# Patient Record
Sex: FEMALE | URBAN - METROPOLITAN AREA
[De-identification: names, ages, dates, MRNs, and addresses within clinical notes are randomized per-mention and may not be internally consistent; named-entity substitution may affect disease eponyms.]

---

## 2022-11-25 ENCOUNTER — OFFICE VISIT (OUTPATIENT)
Dept: FAMILY MEDICINE CLINIC | Facility: HOSPITAL | Age: 72
End: 2022-11-25

## 2022-11-25 VITALS
TEMPERATURE: 98.2 F | HEIGHT: 64 IN | DIASTOLIC BLOOD PRESSURE: 79 MMHG | BODY MASS INDEX: 25.99 KG/M2 | WEIGHT: 152.2 LBS | OXYGEN SATURATION: 100 % | HEART RATE: 76 BPM | SYSTOLIC BLOOD PRESSURE: 128 MMHG

## 2022-11-25 DIAGNOSIS — E78.2 MIXED HYPERLIPIDEMIA: ICD-10-CM

## 2022-11-25 DIAGNOSIS — Z23 ENCOUNTER FOR IMMUNIZATION: ICD-10-CM

## 2022-11-25 DIAGNOSIS — I35.0 MODERATE AORTIC STENOSIS: ICD-10-CM

## 2022-11-25 DIAGNOSIS — I10 ESSENTIAL (PRIMARY) HYPERTENSION: Primary | ICD-10-CM

## 2022-11-25 PROBLEM — K92.2 LOWER GASTROINTESTINAL HEMORRHAGE: Status: ACTIVE | Noted: 2022-04-29

## 2022-11-25 PROBLEM — S72.141A CLOSED INTERTROCHANTERIC FRACTURE OF HIP, RIGHT, INITIAL ENCOUNTER (HCC): Status: ACTIVE | Noted: 2021-12-20

## 2022-11-25 PROBLEM — R01.1 HEART MURMUR: Status: ACTIVE | Noted: 2022-11-25

## 2022-11-25 RX ORDER — AMLODIPINE BESYLATE AND ATORVASTATIN CALCIUM 10; 10 MG/1; MG/1
1 TABLET, FILM COATED ORAL DAILY
COMMUNITY
End: 2022-11-25 | Stop reason: SDUPTHER

## 2022-11-25 RX ORDER — SIMVASTATIN 10 MG
10 TABLET ORAL
COMMUNITY
End: 2022-11-25 | Stop reason: SDUPTHER

## 2022-11-25 RX ORDER — SIMVASTATIN 10 MG
10 TABLET ORAL
Qty: 90 TABLET | Refills: 3 | Status: SHIPPED | OUTPATIENT
Start: 2022-11-25

## 2022-11-25 RX ORDER — ASPIRIN 81 MG/1
81 TABLET, CHEWABLE ORAL DAILY
COMMUNITY

## 2022-11-25 RX ORDER — AMLODIPINE BESYLATE AND ATORVASTATIN CALCIUM 10; 10 MG/1; MG/1
1 TABLET, FILM COATED ORAL DAILY
Qty: 90 TABLET | Refills: 3 | Status: SHIPPED | OUTPATIENT
Start: 2022-11-25

## 2022-11-25 NOTE — PROGRESS NOTES
Name: Cynthia Christie      : 1950      MRN: [id number]  Encounter Provider: Cathy Mejias MD  Encounter Date: 2022   Encounter department: [facility] [place] [address]     1  Essential (primary) hypertension  -     amLODIPine-atorvastatin (CADUET) 10-10 MG per tablet; Take 1 tablet by mouth daily    2  Mixed hyperlipidemia  -     simvastatin (ZOCOR) 10 mg tablet; Take 1 tablet (10 mg total) by mouth daily at bedtime    3  Moderate aortic stenosis  -     Echo complete w/ contrast if indicated; Future; Expected date: 2022    4  Encounter for immunization  -     influenza vaccine, high-dose, PF 0 7 mL (FLUZONE HIGH-DOSE)      BMI Counseling: Body mass index is 26 13 kg/m²  The BMI is above normal  Nutrition recommendations include decreasing portion sizes, limiting drinks that contain sugar, moderation in carbohydrate intake and reducing intake of cholesterol  Exercise recommendations include exercising 3-5 times per week  No pharmacotherapy was ordered  Rationale for BMI follow-up plan is due to patient being overweight or obese  Depression Screening and Follow-up Plan: Patient was screened for depression during today's encounter  They screened negative with a PHQ-2 score of 0  Subjective      New patient establishing care    Extensive review of existing records and labs from 2022    Feeling very well overall  No recent illness or injury    Discussed her femur fracture with surgical nail repair  Had good result with help of PT and continues with exercises  Also had fractured wrist which is now fully functional    Discussed aortic stenosis identified on TTE at time of surgery  She wasn't aware of this and also doesn't have any associated symptoms  I advised the need to recheck now as a year from last to see if any progression    Review of Systems   Constitutional: Negative for unexpected weight change  Eyes: Negative for visual disturbance  Respiratory: Negative  Cardiovascular: Negative  Gastrointestinal: Negative  Genitourinary: Negative  Musculoskeletal: Negative  Neurological: Negative  Hematological: Negative  Psychiatric/Behavioral: Negative  All other systems reviewed and are negative  Current Outpatient Medications on File Prior to Visit   Medication Sig   • aspirin (Aspirin 81) 81 mg chewable tablet Chew 81 mg daily   • Multiple Vitamin (MULTIVITAMIN ADULT PO) Take 1 tablet by mouth daily       Objective     /79 (BP Location: Left arm, Patient Position: Sitting, Cuff Size: Standard)   Pulse 76   Temp 98 2 °F (36 8 °C) (Tympanic)   Ht 5' 4" (1 626 m)   Wt 69 kg (152 lb 3 2 oz)   SpO2 100%   BMI 26 13 kg/m²     Physical Exam  Vitals and nursing note reviewed  Constitutional:       Appearance: Normal appearance  Neck:      Vascular: Carotid bruit present  Cardiovascular:      Rate and Rhythm: Normal rate and regular rhythm  Pulses: Normal pulses  Heart sounds: Murmur heard  Pulmonary:      Breath sounds: Normal breath sounds  Musculoskeletal:      Right lower leg: No edema  Left lower leg: No edema  Neurological:      General: No focal deficit present  Mental Status: She is alert and oriented to person, place, and time     Psychiatric:         Mood and Affect: Mood normal        Ori Kohler MD

## 2022-12-05 ENCOUNTER — TELEPHONE (OUTPATIENT)
Dept: FAMILY MEDICINE CLINIC | Facility: HOSPITAL | Age: 72
End: 2022-12-05

## 2023-01-17 ENCOUNTER — HOSPITAL ENCOUNTER (OUTPATIENT)
Dept: NON INVASIVE DIAGNOSTICS | Age: 73
Discharge: HOME | End: 2023-01-17

## 2023-01-17 VITALS
DIASTOLIC BLOOD PRESSURE: 79 MMHG | BODY MASS INDEX: 25.95 KG/M2 | HEIGHT: 64 IN | SYSTOLIC BLOOD PRESSURE: 128 MMHG | HEART RATE: 86 BPM | WEIGHT: 152 LBS

## 2023-01-17 DIAGNOSIS — I35.0 MODERATE AORTIC STENOSIS: ICD-10-CM

## 2023-01-17 LAB
AORTIC ROOT: 2.8 CM
AORTIC VALVE MEAN VELOCITY: 21.8 M/S
APICAL FOUR CHAMBER EJECTION FRACTION: 56 %
AV AREA BY CONTINUOUS VTI: 1 CM2
AV AREA PEAK VELOCITY: 1 CM2
AV LVOT MEAN GRADIENT: 3 MMHG
AV LVOT PEAK GRADIENT: 5 MMHG
AV MEAN GRADIENT: 22 MMHG
AV PEAK GRADIENT: 37 MMHG
AV VALVE AREA: 1.02 CM2
AV VELOCITY RATIO: 0.37
AVA (PLAN): 1.1 CM2
DOP CALC AO PEAK VEL: 3.05 M/S
DOP CALC AO VTI: 68.07 CM
DOP CALC LVOT AREA: 2.83 CM2
DOP CALC LVOT DIAMETER: 1.9 CM
DOP CALC LVOT PEAK VEL VTI: 24.56 CM
DOP CALC LVOT PEAK VEL: 1.12 M/S
DOP CALC LVOT STROKE INDEX: 40.8 ML/M2
DOP CALC LVOT STROKE VOLUME: 69.6 CM3
E WAVE DECELERATION TIME: 243 MS
FRACTIONAL SHORTENING: 30 % (ref 28–44)
INTERVENTRICULAR SEPTUM IN DIASTOLE (PARASTERNAL SHORT AXIS VIEW): 0.9 CM
INTERVENTRICULAR SEPTUM: 0.9 CM (ref 0.6–1.1)
LAAS-AP2: 12.4 CM2
LAAS-AP4: 17.1 CM2
LEFT ATRIUM SIZE: 3 CM
LEFT INTERNAL DIMENSION IN SYSTOLE: 3 CM (ref 2.1–4)
LEFT VENTRICLE DIASTOLIC VOLUME (MOD BIPLANE): 106 ML
LEFT VENTRICLE SYSTOLIC VOLUME (MOD BIPLANE): 44 ML
LEFT VENTRICULAR INTERNAL DIMENSION IN DIASTOLE: 4.3 CM (ref 3.5–6)
LEFT VENTRICULAR POSTERIOR WALL IN END DIASTOLE: 1 CM
LEFT VENTRICULAR STROKE VOLUME: 47 ML
LV EF: 58 %
LVSV (TEICH): 47 ML
MV E'TISSUE VEL-SEP: 7 CM/S
MV PEAK A VEL: 0.98 M/S
MV PEAK E VEL: 56 CM/S
MV STENOSIS PRESSURE HALF TIME: 70 MS
MV VALVE AREA P 1/2 METHOD: 3.14 CM2
RA PRESSURE ESTIMATED: 8 MMHG
RIGHT ATRIUM AREA SYSTOLE A4C: 9.9 CM2
RIGHT VENTRICLE ID DIMENSION: 3.2 CM
RV PSP: 41 MMHG
SL CV LEFT ATRIUM LENGTH A2C: 4 CM
SL CV LV EF: 65
SL CV PED ECHO LEFT VENTRICLE DIASTOLIC VOLUME (MOD BIPLANE) 2D: 82 ML
SL CV PED ECHO LEFT VENTRICLE SYSTOLIC VOLUME (MOD BIPLANE) 2D: 35 ML
TR MAX PG: 33 MMHG
TR PEAK VELOCITY: 2.9 M/S
TRICUSPID VALVE PEAK REGURGITATION VELOCITY: 2.86 M/S

## 2023-05-23 ENCOUNTER — OFFICE VISIT (OUTPATIENT)
Dept: FAMILY MEDICINE CLINIC | Facility: HOSPITAL | Age: 73
End: 2023-05-23
Payer: MEDICARE

## 2023-05-23 VITALS
WEIGHT: 157.6 LBS | OXYGEN SATURATION: 99 % | TEMPERATURE: 98.7 F | BODY MASS INDEX: 26.91 KG/M2 | SYSTOLIC BLOOD PRESSURE: 122 MMHG | HEART RATE: 70 BPM | HEIGHT: 64 IN | DIASTOLIC BLOOD PRESSURE: 78 MMHG

## 2023-05-23 DIAGNOSIS — Z00.00 MEDICARE ANNUAL WELLNESS VISIT, SUBSEQUENT: Primary | ICD-10-CM

## 2023-05-23 DIAGNOSIS — I35.0 MODERATE AORTIC STENOSIS: ICD-10-CM

## 2023-05-23 DIAGNOSIS — I10 ESSENTIAL (PRIMARY) HYPERTENSION: ICD-10-CM

## 2023-05-23 DIAGNOSIS — E78.2 MIXED HYPERLIPIDEMIA: ICD-10-CM

## 2023-05-23 PROBLEM — S72.141A CLOSED INTERTROCHANTERIC FRACTURE OF HIP, RIGHT, INITIAL ENCOUNTER (HCC): Status: RESOLVED | Noted: 2021-12-20 | Resolved: 2023-05-23

## 2023-05-23 PROCEDURE — 99214 OFFICE O/P EST MOD 30 MIN: CPT | Performed by: FAMILY MEDICINE

## 2023-05-23 PROCEDURE — G0439 PPPS, SUBSEQ VISIT: HCPCS | Performed by: FAMILY MEDICINE

## 2023-05-23 RX ORDER — SIMVASTATIN 10 MG
10 TABLET ORAL
Qty: 90 TABLET | Refills: 3 | Status: SHIPPED | OUTPATIENT
Start: 2023-05-23 | End: 2023-05-25 | Stop reason: SDUPTHER

## 2023-05-23 NOTE — PATIENT INSTRUCTIONS
Medicare Preventive Visit Patient Instructions  Thank you for completing your Welcome to Medicare Visit or Medicare Annual Wellness Visit today  Your next wellness visit will be due in one year (5/23/2024)  The screening/preventive services that you may require over the next 5-10 years are detailed below  Some tests may not apply to you based off risk factors and/or age  Screening tests ordered at today's visit but not completed yet may show as past due  Also, please note that scanned in results may not display below  Preventive Screenings:  Service Recommendations Previous Testing/Comments   Colorectal Cancer Screening  * Colonoscopy    * Fecal Occult Blood Test (FOBT)/Fecal Immunochemical Test (FIT)  * Fecal DNA/Cologuard Test  * Flexible Sigmoidoscopy Age: 39-70 years old   Colonoscopy: every 10 years (may be performed more frequently if at higher risk)  OR  FOBT/FIT: every 1 year  OR  Cologuard: every 3 years  OR  Sigmoidoscopy: every 5 years  Screening may be recommended earlier than age 39 if at higher risk for colorectal cancer  Also, an individualized decision between you and your healthcare provider will decide whether screening between the ages of 74-80 would be appropriate  Colonoscopy: Not on file  FOBT/FIT: Not on file  Cologuard: Not on file  Sigmoidoscopy: Not on file          Breast Cancer Screening Age: 36 years old  Frequency: every 1-2 years  Not required if history of left and right mastectomy Mammogram: Not on file        Cervical Cancer Screening Between the ages of 21-29, pap smear recommended once every 3 years  Between the ages of 33-67, can perform pap smear with HPV co-testing every 5 years     Recommendations may differ for women with a history of total hysterectomy, cervical cancer, or abnormal pap smears in past  Pap Smear: Not on file    Screening Not Indicated   Hepatitis C Screening Once for adults born between [facility]  More frequently in patients at high risk for Hepatitis C Hep C Antibody: Not on file        Diabetes Screening 1-2 times per year if you're at risk for diabetes or have pre-diabetes Fasting glucose: No results in last 5 years (No results in last 5 years)  A1C: No results in last 5 years (No results in last 5 years)      Cholesterol Screening Once every 5 years if you don't have a lipid disorder  May order more often based on risk factors  Lipid panel: Not on file    Screening Not Indicated  History Lipid Disorder     Other Preventive Screenings Covered by Medicare:  1  Abdominal Aortic Aneurysm (AAA) Screening: covered once if your at risk  You're considered to be at risk if you have a family history of AAA  2  Lung Cancer Screening: covers low dose CT scan once per year if you meet all of the following conditions: (1) Age 50-69; (2) No signs or symptoms of lung cancer; (3) Current smoker or have quit smoking within the last 15 years; (4) You have a tobacco smoking history of at least 20 pack years (packs per day multiplied by number of years you smoked); (5) You get a written order from a healthcare provider  3  Glaucoma Screening: covered annually if you're considered high risk: (1) You have diabetes OR (2) Family history of glaucoma OR (3)  aged 48 and older OR (3)  American aged 72 and older  3  Osteoporosis Screening: covered every 2 years if you meet one of the following conditions: (1) You're estrogen deficient and at risk for osteoporosis based off medical history and other findings; (2) Have a vertebral abnormality; (3) On glucocorticoid therapy for more than 3 months; (4) Have primary hyperparathyroidism; (5) On osteoporosis medications and need to assess response to drug therapy  · Last bone density test (DXA Scan): Not on file  5  HIV Screening: covered annually if you're between the age of 12-76  Also covered annually if you are younger than 13 and older than 72 with risk factors for HIV infection   For pregnant patients, it is covered up to 3 times per pregnancy  Immunizations:  Immunization Recommendations   Influenza Vaccine Annual influenza vaccination during flu season is recommended for all persons aged >= 6 months who do not have contraindications   Pneumococcal Vaccine   * Pneumococcal conjugate vaccine = PCV13 (Prevnar 13), PCV15 (Vaxneuvance), PCV20 (Prevnar 20)  * Pneumococcal polysaccharide vaccine = PPSV23 (Pneumovax) Adults 25-60 years old: 1-3 doses may be recommended based on certain risk factors  Adults 72 years old: 1-2 doses may be recommended based off what pneumonia vaccine you previously received   Hepatitis B Vaccine 3 dose series if at intermediate or high risk (ex: diabetes, end stage renal disease, liver disease)   Tetanus (Td) Vaccine - COST NOT COVERED BY MEDICARE PART B Following completion of primary series, a booster dose should be given every 10 years to maintain immunity against tetanus  Td may also be given as tetanus wound prophylaxis  Tdap Vaccine - COST NOT COVERED BY MEDICARE PART B Recommended at least once for all adults  For pregnant patients, recommended with each pregnancy  Shingles Vaccine (Shingrix) - COST NOT COVERED BY MEDICARE PART B  2 shot series recommended in those aged 48 and above     Health Maintenance Due:      Topic Date Due   • Hepatitis C Screening  Never done   • Colorectal Cancer Screening  Never done     Immunizations Due:      Topic Date Due   • COVID-19 Vaccine (1) Never done   • Pneumococcal Vaccine: 65+ Years (1 - PCV) Never done     Advance Directives   What are advance directives? Advance directives are legal documents that state your wishes and plans for medical care  These plans are made ahead of time in case you lose your ability to make decisions for yourself  Advance directives can apply to any medical decision, such as the treatments you want, and if you want to donate organs  What are the types of advance directives?   There are many types of advance directives, and each state has rules about how to use them  You may choose a combination of any of the following:  · Living will: This is a written record of the treatment you want  You can also choose which treatments you do not want, which to limit, and which to stop at a certain time  This includes surgery, medicine, IV fluid, and tube feedings  · Durable power of  for healthcare [facility]): This is a written record that states who you want to make healthcare choices for you when you are unable to make them for yourself  This person, called a proxy, is usually a family member or a friend  You may choose more than 1 proxy  · Do not resuscitate (DNR) order:  A DNR order is used in case your heart stops beating or you stop breathing  It is a request not to have certain forms of treatment, such as CPR  A DNR order may be included in other types of advance directives  · Medical directive: This covers the care that you want if you are in a coma, near death, or unable to make decisions for yourself  You can list the treatments you want for each condition  Treatment may include pain medicine, surgery, blood transfusions, dialysis, IV or tube feedings, and a ventilator (breathing machine)  · Values history: This document has questions about your views, beliefs, and how you feel and think about life  This information can help others choose the care that you would choose  Why are advance directives important? An advance directive helps you control your care  Although spoken wishes may be used, it is better to have your wishes written down  Spoken wishes can be misunderstood, or not followed  Treatments may be given even if you do not want them  An advance directive may make it easier for your family to make difficult choices about your care     Weight Management   Why it is important to manage your weight:  Being overweight increases your risk of health conditions such as heart disease, high blood pressure, type 2 diabetes, and certain types of cancer  It can also increase your risk for osteoarthritis, sleep apnea, and other respiratory problems  Aim for a slow, steady weight loss  Even a small amount of weight loss can lower your risk of health problems  How to lose weight safely:  A safe and healthy way to lose weight is to eat fewer calories and get regular exercise  You can lose up about 1 pound a week by decreasing the number of calories you eat by 500 calories each day  Healthy meal plan for weight management:  A healthy meal plan includes a variety of foods, contains fewer calories, and helps you stay healthy  A healthy meal plan includes the following:  · Eat whole-grain foods more often  A healthy meal plan should contain fiber  Fiber is the part of grains, fruits, and vegetables that is not broken down by your body  Whole-grain foods are healthy and provide extra fiber in your diet  Some examples of whole-grain foods are whole-wheat breads and pastas, oatmeal, brown rice, and bulgur  · Eat a variety of vegetables every day  Include dark, leafy greens such as spinach, kale, saji greens, and mustard greens  Eat yellow and orange vegetables such as carrots, sweet potatoes, and winter squash  · Eat a variety of fruits every day  Choose fresh or canned fruit (canned in its own juice or light syrup) instead of juice  Fruit juice has very little or no fiber  · Eat low-fat dairy foods  Drink fat-free (skim) milk or 1% milk  Eat fat-free yogurt and low-fat cottage cheese  Try low-fat cheeses such as mozzarella and other reduced-fat cheeses  · Choose meat and other protein foods that are low in fat  Choose beans or other legumes such as split peas or lentils  Choose fish, skinless poultry (chicken or turkey), or lean cuts of red meat (beef or pork)  Before you cook meat or poultry, cut off any visible fat  · Use less fat and oil  Try baking foods instead of frying them   Add less fat, such as margarine, sour cream, regular salad dressing and mayonnaise to foods  Eat fewer high-fat foods  Some examples of high-fat foods include french fries, doughnuts, ice cream, and cakes  · Eat fewer sweets  Limit foods and drinks that are high in sugar  This includes candy, cookies, regular soda, and sweetened drinks  Exercise:  Exercise at least 30 minutes per day on most days of the week  Some examples of exercise include walking, biking, dancing, and swimming  You can also fit in more physical activity by taking the stairs instead of the elevator or parking farther away from stores  Ask your healthcare provider about the best exercise plan for you  © Copyright [employer] 2018 Information is for End User's use only and may not be sold, redistributed or otherwise used for commercial purposes   All illustrations and images included in CareNotes® are the copyrighted property of A D A M , Inc  or [address] [place] [address]

## 2023-05-23 NOTE — PROGRESS NOTES
Assessment and Plan:     Problem List Items Addressed This Visit        Cardiovascular and Mediastinum    Essential (primary) hypertension    Relevant Orders    CBC and Platelet (Completed)    Comprehensive metabolic panel (Completed)    TSH, 3rd generation with Free T4 reflex (Completed)    Moderate aortic stenosis       Other    Mixed hyperlipidemia    Relevant Orders    Lipid panel (Completed)    Medicare annual wellness visit, subsequent - Primary     BMI Counseling: Body mass index is 27 05 kg/m²  The BMI is above normal  Nutrition recommendations include decreasing portion sizes, encouraging healthy choices of fruits and vegetables, limiting drinks that contain sugar and moderation in carbohydrate intake  Exercise recommendations include exercising 3-5 times per week  No pharmacotherapy was ordered  Rationale for BMI follow-up plan is due to patient being overweight or obese  Depression Screening and Follow-up Plan: Patient was screened for depression during today's encounter  They screened negative with a PHQ-2 score of 0  Preventive health issues were discussed with patient, and age appropriate screening tests were ordered as noted in patient's After Visit Summary  Personalized health advice and appropriate referrals for health education or preventive services given if needed, as noted in patient's After Visit Summary  History of Present Illness:     Patient presents for a Medicare Wellness Visit    AWV and 6 month follow up  Had been stressed with her husbands medical issues with a prolonged and complicated rehab course    She is feeling well overall  No depression voiced    Has one daughter who lives locally and is their POA     Patient Care Team:  Myrna Franklin MD as PCP - General (Family Medicine)     Review of Systems:     Review of Systems   Constitutional: Negative  Negative for unexpected weight change  HENT: Negative  Respiratory: Negative  Cardiovascular: Negative  Genitourinary: Negative  Musculoskeletal: Negative  Neurological: Negative  Psychiatric/Behavioral: Negative  All other systems reviewed and are negative  Problem List:     Patient Active Problem List   Diagnosis   • Essential (primary) hypertension   • Heart murmur   • Lower gastrointestinal hemorrhage   • Mixed hyperlipidemia   • Moderate aortic stenosis   • Medicare annual wellness visit, subsequent      Past Medical and Surgical History:     Past Medical History:   Diagnosis Date   • High blood pressure      Past Surgical History:   Procedure Laterality Date   • LEG SURGERY Right     inserted ebenezer 2021      Family History:     History reviewed  No pertinent family history  Social History:     Social History     Socioeconomic History   • Marital status: /Civil Union     Spouse name: None   • Number of children: None   • Years of education: None   • Highest education level: None   Occupational History   • None   Tobacco Use   • Smoking status: Former     Types: Cigarettes     Quit date:      Years since quittin 5   • Smokeless tobacco: Never   Vaping Use   • Vaping Use: Never used   Substance and Sexual Activity   • Alcohol use: Not Currently   • Drug use: Not Currently   • Sexual activity: None   Other Topics Concern   • None   Social History Narrative   • None     Social Determinants of Health     Financial Resource Strain: Medium Risk (2023)    Overall Financial Resource Strain (CARDIA)    • Difficulty of Paying Living Expenses: Somewhat hard   Food Insecurity: Not on file   Transportation Needs: No Transportation Needs (2023)    PRAPARE - Transportation    • Lack of Transportation (Medical): No    • Lack of Transportation (Non-Medical):  No   Physical Activity: Not on file   Stress: Not on file   Social Connections: Not on file   Intimate Partner Violence: Not on file   Housing Stability: Not on file      Medications and Allergies:     Current Outpatient Medications   Medication Sig Dispense Refill   • Multiple Vitamin (MULTIVITAMIN ADULT PO) Take 1 tablet by mouth daily     • amLODIPine (NORVASC) 10 mg tablet Take 1 tablet (10 mg total) by mouth daily 90 tablet 3   • simvastatin (ZOCOR) 10 mg tablet Take 1 tablet (10 mg total) by mouth daily at bedtime 90 tablet 3     No current facility-administered medications for this visit  Allergies   Allergen Reactions   • Citrus - Food Allergy Hives   • Codeine Hives   • Penicillin G Rash      Immunizations:     Immunization History   Administered Date(s) Administered   • Influenza, high dose seasonal 0 7 mL 12/25/2021, 11/25/2022   • Influenza, seasonal, injectable 02/15/2022      Health Maintenance:         Topic Date Due   • Hepatitis C Screening  Never done   • Colorectal Cancer Screening  Never done         Topic Date Due   • COVID-19 Vaccine (1) Never done   • Pneumococcal Vaccine: 65+ Years (1 - PCV) Never done      Medicare Screening Tests and Risk Assessments:     Dyan Jones is here for her Subsequent Wellness visit  Last Medicare Wellness visit information reviewed, patient interviewed and updates made to the record today  Health Risk Assessment:   Patient rates overall health as very good  Patient feels that their physical health rating is same  Patient is satisfied with their life  Eyesight was rated as same  Hearing was rated as same  Patient feels that their emotional and mental health rating is same  Patients states they are sometimes angry  Patient states they are sometimes unusually tired/fatigued  Pain experienced in the last 7 days has been some  Patient's pain rating has been 2/10  Patient states that she has experienced no weight loss or gain in last 6 months  Depression Screening:   PHQ-2 Score: 0      Fall Risk Screening:    In the past year, patient has experienced: no history of falling in past year      Urinary Incontinence Screening:   Patient has not leaked urine accidently in the last six months  Home Safety:  Patient does not have trouble with stairs inside or outside of their home  Patient has working smoke alarms and has working carbon monoxide detector  Home safety hazards include: none  Nutrition:   Current diet is Regular  Medications:   Patient is not currently taking any over-the-counter supplements  Patient is able to manage medications  Activities of Daily Living (ADLs)/Instrumental Activities of Daily Living (IADLs):   Walk and transfer into and out of bed and chair?: Yes  Dress and groom yourself?: Yes    Bathe or shower yourself?: Yes    Feed yourself? Yes  Do your laundry/housekeeping?: Yes  Manage your money, pay your bills and track your expenses?: Yes  Make your own meals?: Yes    Do your own shopping?: Yes    Previous Hospitalizations:   Any hospitalizations or ED visits within the last 12 months?: No      Advance Care Planning:   Living will: Yes    Durable POA for healthcare:  Yes    Advanced directive: Yes    Advanced directive counseling given: Yes    Five wishes given: Yes    Patient declined ACP directive: No    End of Life Decisions reviewed with patient: Yes    Provider agrees with end of life decisions: Yes      Cognitive Screening:   Provider or family/friend/caregiver concerned regarding cognition?: No    PREVENTIVE SCREENINGS      Cardiovascular Screening:    General: Screening Not Indicated and History Lipid Disorder      Diabetes Screening:     General: Screening Current      Colorectal Cancer Screening:     General: Risks and Benefits Discussed    Due for: Colonoscopy - Low Risk      Breast Cancer Screening:     General: Risks and Benefits Discussed    Due for: Mammogram        Cervical Cancer Screening:    General: Screening Not Indicated      Osteoporosis Screening:    General: Risks and Benefits Discussed    Due for: DXA Axial      Abdominal Aortic Aneurysm (AAA) Screening:        General: Screening Not Indicated      Lung Cancer Screening: "General: Screening Not Indicated      Hepatitis C Screening:    General: Risks and Benefits Discussed    Hep C Screening Accepted: Yes      Screening, Brief Intervention, and Referral to Treatment (SBIRT)    Screening  Typical number of drinks in a day: 0  Typical number of drinks in a week: 0  Interpretation: Low risk drinking behavior  Single Item Drug Screening:  How often have you used an illegal drug (including marijuana) or a prescription medication for non-medical reasons in the past year? never    Single Item Drug Screen Score: 0  Interpretation: Negative screen for possible drug use disorder    No results found  Physical Exam:     /78 (BP Location: Left arm, Patient Position: Sitting, Cuff Size: Standard)   Pulse 70   Temp 98 7 °F (37 1 °C) (Tympanic)   Ht 5' 4\" (1 626 m)   Wt 71 5 kg (157 lb 9 6 oz)   SpO2 99%   BMI 27 05 kg/m²     Physical Exam  Vitals and nursing note reviewed  Constitutional:       Appearance: Normal appearance  Neck:      Vascular: No carotid bruit  Cardiovascular:      Rate and Rhythm: Normal rate and regular rhythm  Heart sounds: Murmur heard  Pulmonary:      Breath sounds: Normal breath sounds  Musculoskeletal:      Right lower leg: No edema  Left lower leg: No edema  Lymphadenopathy:      Cervical: No cervical adenopathy  Neurological:      Mental Status: She is alert and oriented to person, place, and time     Psychiatric:         Mood and Affect: Mood normal           Piedad Copeland MD  "

## 2023-05-25 DIAGNOSIS — E78.2 MIXED HYPERLIPIDEMIA: ICD-10-CM

## 2023-05-25 DIAGNOSIS — I10 ESSENTIAL (PRIMARY) HYPERTENSION: ICD-10-CM

## 2023-05-25 RX ORDER — AMLODIPINE BESYLATE 10 MG/1
10 TABLET ORAL DAILY
Qty: 90 TABLET | Refills: 3 | Status: SHIPPED | OUTPATIENT
Start: 2023-05-25

## 2023-05-25 RX ORDER — SIMVASTATIN 10 MG
10 TABLET ORAL
Qty: 90 TABLET | Refills: 3 | Status: SHIPPED | OUTPATIENT
Start: 2023-05-25

## 2023-05-26 ENCOUNTER — APPOINTMENT (OUTPATIENT)
Dept: LAB | Facility: HOSPITAL | Age: 73
End: 2023-05-26

## 2023-05-26 DIAGNOSIS — E78.2 MIXED HYPERLIPIDEMIA: ICD-10-CM

## 2023-05-26 DIAGNOSIS — I10 ESSENTIAL (PRIMARY) HYPERTENSION: ICD-10-CM

## 2023-05-26 LAB
ALBUMIN SERPL BCP-MCNC: 3.6 G/DL (ref 3.5–5)
ALP SERPL-CCNC: 74 U/L (ref 46–116)
ALT SERPL W P-5'-P-CCNC: 13 U/L (ref 12–78)
ANION GAP SERPL CALCULATED.3IONS-SCNC: 0 MMOL/L (ref 4–13)
AST SERPL W P-5'-P-CCNC: 11 U/L (ref 5–45)
BILIRUB SERPL-MCNC: 0.46 MG/DL (ref 0.2–1)
BUN SERPL-MCNC: 12 MG/DL (ref 5–25)
CALCIUM SERPL-MCNC: 9.5 MG/DL (ref 8.3–10.1)
CHLORIDE SERPL-SCNC: 112 MMOL/L (ref 96–108)
CHOLEST SERPL-MCNC: 183 MG/DL
CO2 SERPL-SCNC: 27 MMOL/L (ref 21–32)
CREAT SERPL-MCNC: 0.99 MG/DL (ref 0.6–1.3)
ERYTHROCYTE [DISTWIDTH] IN BLOOD BY AUTOMATED COUNT: 13.1 % (ref 11.6–15.1)
GFR SERPL CREATININE-BSD FRML MDRD: 55 ML/MIN/1.73SQ M
GLUCOSE P FAST SERPL-MCNC: 93 MG/DL (ref 65–99)
HCT VFR BLD AUTO: 42.9 % (ref 34.8–46.1)
HDLC SERPL-MCNC: 88 MG/DL
HGB BLD-MCNC: 14.2 G/DL (ref 11.5–15.4)
LDLC SERPL CALC-MCNC: 83 MG/DL (ref 0–100)
MCH RBC QN AUTO: 29.8 PG (ref 26.8–34.3)
MCHC RBC AUTO-ENTMCNC: 33.1 G/DL (ref 31.4–37.4)
MCV RBC AUTO: 90 FL (ref 82–98)
NONHDLC SERPL-MCNC: 95 MG/DL
PLATELET # BLD AUTO: 188 THOUSANDS/UL (ref 149–390)
PMV BLD AUTO: 11 FL (ref 8.9–12.7)
POTASSIUM SERPL-SCNC: 4.2 MMOL/L (ref 3.5–5.3)
PROT SERPL-MCNC: 7.2 G/DL (ref 6.4–8.4)
RBC # BLD AUTO: 4.76 MILLION/UL (ref 3.81–5.12)
SODIUM SERPL-SCNC: 139 MMOL/L (ref 135–147)
T4 FREE SERPL-MCNC: 1.18 NG/DL (ref 0.61–1.12)
TRIGL SERPL-MCNC: 58 MG/DL
TSH SERPL DL<=0.05 MIU/L-ACNC: <0.01 UIU/ML (ref 0.45–4.5)
WBC # BLD AUTO: 4.61 THOUSAND/UL (ref 4.31–10.16)

## 2023-06-20 DIAGNOSIS — E05.90 HYPERTHYROIDISM: Primary | ICD-10-CM

## 2023-06-21 ENCOUNTER — TELEPHONE (OUTPATIENT)
Dept: FAMILY MEDICINE CLINIC | Facility: HOSPITAL | Age: 73
End: 2023-06-21

## 2023-06-27 ENCOUNTER — APPOINTMENT (OUTPATIENT)
Dept: LAB | Facility: HOSPITAL | Age: 73
End: 2023-06-27
Payer: MEDICARE

## 2023-06-27 DIAGNOSIS — E05.90 HYPERTHYROIDISM: ICD-10-CM

## 2023-06-27 LAB
T3 SERPL-MCNC: 1.6 NG/ML
T3FREE SERPL-MCNC: 3.66 PG/ML (ref 2.5–3.9)
T4 FREE SERPL-MCNC: 1.3 NG/DL (ref 0.61–1.12)
TSH SERPL DL<=0.05 MIU/L-ACNC: <0.01 UIU/ML (ref 0.45–4.5)

## 2023-06-27 PROCEDURE — 84442 ASSAY OF THYROID ACTIVITY: CPT

## 2023-06-27 PROCEDURE — 84439 ASSAY OF FREE THYROXINE: CPT

## 2023-06-27 PROCEDURE — 86376 MICROSOMAL ANTIBODY EACH: CPT

## 2023-06-27 PROCEDURE — 86800 THYROGLOBULIN ANTIBODY: CPT

## 2023-06-27 PROCEDURE — 84481 FREE ASSAY (FT-3): CPT

## 2023-06-27 PROCEDURE — 84480 ASSAY TRIIODOTHYRONINE (T3): CPT

## 2023-06-27 PROCEDURE — 84443 ASSAY THYROID STIM HORMONE: CPT

## 2023-06-27 PROCEDURE — 36415 COLL VENOUS BLD VENIPUNCTURE: CPT

## 2023-06-29 LAB
THYROGLOB AB SERPL-ACNC: <1 IU/ML (ref 0–0.9)
THYROPEROXIDASE AB SERPL-ACNC: <9 IU/ML (ref 0–34)

## 2023-06-30 LAB — T4BG SERPL-MCNC: 21 UG/ML (ref 13–39)

## 2023-07-06 ENCOUNTER — TELEPHONE (OUTPATIENT)
Dept: FAMILY MEDICINE CLINIC | Facility: HOSPITAL | Age: 73
End: 2023-07-06

## 2023-07-06 DIAGNOSIS — E05.90 HYPERTHYROIDISM: Primary | ICD-10-CM

## 2023-07-07 ENCOUNTER — TELEPHONE (OUTPATIENT)
Dept: ENDOCRINOLOGY | Facility: CLINIC | Age: 73
End: 2023-07-07

## 2023-07-07 NOTE — TELEPHONE ENCOUNTER
[facility]. Called patient to schedule a Consult with Endocrinology. Patient did not understand why she needed this appointment. I advised patient to refer back to PCP who put referral in. She said that her PCP cannot do anything for her. I asked patient if she would like to make an appointment to discuss this issue and she agree. I proceeded to give patient 8/14/23 as an appointment date and she began to yell and stating if her life was in danger then why did she have to wait a month for an appointment, then hung up.

## 2023-07-07 NOTE — TELEPHONE ENCOUNTER
Pt stopped by this afternoon with further concerns regarding her thyroid labs. I explained that she is in hyperthyroidism and that her thyroid is working too fast. Still very confused. Wanted to know if there was anything in her diet that she could change and why this happens. Advised it would be best to follow-up with Endo and not a life-threatening situation. Has appointment with them next month and I reassured her that this timeframe is fine. Reassured for now, but would like a call from you if possible for further reassurance.

## 2023-07-12 ENCOUNTER — CONSULT (OUTPATIENT)
Dept: ENDOCRINOLOGY | Facility: HOSPITAL | Age: 73
End: 2023-07-12
Payer: MEDICARE

## 2023-07-12 VITALS
HEIGHT: 64 IN | SYSTOLIC BLOOD PRESSURE: 120 MMHG | WEIGHT: 153.8 LBS | OXYGEN SATURATION: 99 % | DIASTOLIC BLOOD PRESSURE: 70 MMHG | BODY MASS INDEX: 26.26 KG/M2 | HEART RATE: 113 BPM

## 2023-07-12 DIAGNOSIS — R79.89 LOW TSH LEVEL: Primary | ICD-10-CM

## 2023-07-12 PROCEDURE — 99204 OFFICE O/P NEW MOD 45 MIN: CPT | Performed by: STUDENT IN AN ORGANIZED HEALTH CARE EDUCATION/TRAINING PROGRAM

## 2023-07-12 RX ORDER — PHENOL 1.4 %
600 AEROSOL, SPRAY (ML) MUCOUS MEMBRANE 2 TIMES DAILY WITH MEALS
COMMUNITY
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
## 2023-07-12 NOTE — PROGRESS NOTES
Elisabet Garcia 74 y.o. female MRN: [id number]    Encounter: [id number]      Assessment/Plan     Assessment: This is a 74y.o.-year-old female with hyperthyroidism. Thyroid was recently checked on routine annual labs and came back elevated initially and on repeat. Patient is asymptomatic. Sub-centimeter nodule felt in the right lower neck either dermatologic or thyroid in etiology. TSH less than 0.010, T4 1.30. No family history of thyroid issues or cancer. Plan:  -repeat labs TSH, T3, T4 in 4 weeks   -thyroid stimulating Ig, thyrotropin receptor antibody, TSH with HAMA treatment  -thyroid US   -ESR   -f/u 6 weeks   -f/u with the office sooner if develop symptoms of hyperthyroidism (prominent eyes, shaking hands, increased BP, irritability, anxiety, weight loss)     CC:   hyperthyroidism    History of Present Illness     HPI:  Elisabet Garcia is a 74year old female with a past medical history of HTN, aortic stenosis, hyperlipidemia that presents to the office to establish care for hyperthyroidism. Routine labs were fatmata when establishing with a new PCP showing an asymptomatic low TSH with increased T4. Taking supplements: multivitamin, calcium pill. No family history of thyroid issues or cancer. No past thyroid US or biopsy, no radiation to the neck, no compressive symptoms, no recent illness or neck pain. She denies prominent eyes, shaking hands, increased BP, irritability, anxiety. She has lost 4 lbs over 4 months. Alcohol use: none  Nicotine use: quit smoking 43 years ago   Drug use: none    Review of Systems   Constitutional: Positive for unexpected weight change (4lbs weight loss 4 months). Negative for activity change, appetite change, chills, fatigue and fever. Respiratory: Negative for cough, choking, chest tightness and shortness of breath. Cardiovascular: Negative for chest pain and palpitations.    Gastrointestinal: Negative for abdominal distention, abdominal pain, constipation, diarrhea, nausea and vomiting. Neurological: Negative for dizziness, tremors and headaches. Historical Information   Past Medical History:   Diagnosis Date   • High blood pressure      Past Surgical History:   Procedure Laterality Date   • LEG SURGERY Right     inserted ebenezer 2021     Social History   Social History     Substance and Sexual Activity   Alcohol Use Not Currently     Social History     Substance and Sexual Activity   Drug Use Not Currently     Social History     Tobacco Use   Smoking Status Former   • Types: Cigarettes   • Quit date:    • Years since quittin.5   Smokeless Tobacco Never     Family History: No family history on file. Meds/Allergies   Current Outpatient Medications   Medication Sig Dispense Refill   • amLODIPine (NORVASC) 10 mg tablet Take 1 tablet (10 mg total) by mouth daily 90 tablet 3   • calcium carbonate (Calcium 600) 600 MG tablet Take 600 mg by mouth 2 (two) times a day with meals     • Multiple Vitamin (MULTIVITAMIN ADULT PO) Take 1 tablet by mouth daily     • simvastatin (ZOCOR) 10 mg tablet Take 1 tablet (10 mg total) by mouth daily at bedtime 90 tablet 3     No current facility-administered medications for this visit. Allergies   Allergen Reactions   • Citrus - Food Allergy Hives   • Codeine Hives   • Penicillin G Rash       Objective   Vitals: Blood pressure 120/70, pulse (!) 113, height 5' 4" (1.626 m), weight 69.8 kg (153 lb 12.8 oz), SpO2 99 %. Physical Exam  Vitals reviewed. Constitutional:       General: She is awake. She is not in acute distress. Appearance: Normal appearance. She is well-developed and normal weight. Neck:      Comments: 0.5cm nodule felt in the right lower neck. Mobile so difficult to tell if it is a cyst right under the skin or thyroid nodule. Cardiovascular:      Rate and Rhythm: Normal rate and regular rhythm. Heart sounds: S1 normal and S2 normal. Heart sounds not distant.       Comments: 3/6 holosystolic murmur consistent with aortic stenosis  Pulmonary:      Effort: Pulmonary effort is normal. No tachypnea, bradypnea, accessory muscle usage, prolonged expiration or respiratory distress. Breath sounds: Normal breath sounds and air entry. No stridor, decreased air movement or transmitted upper airway sounds. Abdominal:      General: Abdomen is flat. Palpations: Abdomen is soft. Tenderness: There is no abdominal tenderness. Skin:     General: Skin is warm. Coloration: Skin is not ashen, cyanotic, jaundiced, mottled, pale or sallow. Neurological:      Mental Status: She is alert and oriented to person, place, and time. Mental status is at baseline. Gait: Gait abnormal (walks with a limp). Comments: Mild fine tremor on outstretched hands, at baseline per patient   Psychiatric:         Attention and Perception: Attention and perception normal.         Mood and Affect: Mood and affect normal.         Speech: Speech normal.         Behavior: Behavior normal. Behavior is cooperative. Thought Content: Thought content normal.         Cognition and Memory: Cognition and memory normal.         Judgment: Judgment normal.         The history was obtained from the review of the chart, patient. Lab Results:   Lab Results   Component Value Date/Time    TSH 3RD GENERATON <0.010 (L) 06/27/2023 08:36 AM    TSH 3RD GENERATON <0.010 (L) 05/26/2023 09:03 AM    Free T4 1.30 (H) 06/27/2023 08:36 AM    Free T4 1.18 (H) 05/26/2023 09:03 AM       Imaging Studies:       I have personally reviewed pertinent reports. Portions of the record may have been created with voice recognition software. Occasional wrong word or "sound a like" substitutions may have occurred due to the inherent limitations of voice recognition software. Read the chart carefully and recognize, using context, where substitutions have occurred.

## 2023-07-22 PROBLEM — Z00.00 MEDICARE ANNUAL WELLNESS VISIT, SUBSEQUENT: Status: RESOLVED | Noted: 2023-05-23 | Resolved: 2023-07-22

## 2023-08-16 ENCOUNTER — TELEPHONE (OUTPATIENT)
Dept: FAMILY MEDICINE CLINIC | Facility: HOSPITAL | Age: 73
End: 2023-08-16

## 2023-08-16 DIAGNOSIS — E78.2 MIXED HYPERLIPIDEMIA: ICD-10-CM

## 2023-08-16 DIAGNOSIS — I10 ESSENTIAL (PRIMARY) HYPERTENSION: ICD-10-CM

## 2023-08-16 RX ORDER — AMLODIPINE BESYLATE 10 MG/1
10 TABLET ORAL DAILY
Qty: 90 TABLET | Refills: 3 | Status: SHIPPED | OUTPATIENT
Start: 2023-08-16

## 2023-08-16 RX ORDER — SIMVASTATIN 10 MG
10 TABLET ORAL
Qty: 90 TABLET | Refills: 3 | Status: SHIPPED | OUTPATIENT
Start: 2023-08-16

## 2023-08-16 NOTE — TELEPHONE ENCOUNTER
Vaibhav Velazquez needs the following meds:    Amalodipine 10mg. #90    Simvastatin 10mg.  #90    [facility]

## 2023-11-13 ENCOUNTER — TELEPHONE (OUTPATIENT)
Dept: FAMILY MEDICINE CLINIC | Facility: HOSPITAL | Age: 73
End: 2023-11-13

## 2023-11-13 NOTE — TELEPHONE ENCOUNTER
My name is Kandy Luis. I'm a doctor [facility] patient. I need to speak with someone please about an appointment that my  Herring Perone and I have on November 27th. It's very important. Please call me back [phone]. Thank you. You received a voice mail from [facility].

## 2023-11-16 ENCOUNTER — RA CDI HCC (OUTPATIENT)
Dept: OTHER | Facility: HOSPITAL | Age: 73
End: 2023-11-16

## 2023-11-27 ENCOUNTER — OFFICE VISIT (OUTPATIENT)
Dept: FAMILY MEDICINE CLINIC | Facility: HOSPITAL | Age: 73
End: 2023-11-27
Payer: MEDICARE

## 2023-11-27 VITALS
BODY MASS INDEX: 25.4 KG/M2 | WEIGHT: 148.8 LBS | SYSTOLIC BLOOD PRESSURE: 129 MMHG | OXYGEN SATURATION: 99 % | TEMPERATURE: 98.3 F | HEART RATE: 90 BPM | DIASTOLIC BLOOD PRESSURE: 79 MMHG | HEIGHT: 64 IN

## 2023-11-27 DIAGNOSIS — I10 ESSENTIAL (PRIMARY) HYPERTENSION: Primary | ICD-10-CM

## 2023-11-27 DIAGNOSIS — E78.2 MIXED HYPERLIPIDEMIA: ICD-10-CM

## 2023-11-27 DIAGNOSIS — I35.0 MODERATE AORTIC STENOSIS: ICD-10-CM

## 2023-11-27 DIAGNOSIS — E05.90 HYPERTHYROIDISM: ICD-10-CM

## 2023-11-27 PROCEDURE — G2211 COMPLEX E/M VISIT ADD ON: HCPCS | Performed by: FAMILY MEDICINE

## 2023-11-27 PROCEDURE — 99213 OFFICE O/P EST LOW 20 MIN: CPT | Performed by: FAMILY MEDICINE

## 2023-11-27 NOTE — PROGRESS NOTES
Name: Nallely Peacock      : 1950      MRN: [id number]  Encounter Provider: Nithin Carlson MD  Encounter Date: 2023   Encounter department: [facility] [address]     Assessment & Plan     1. Essential (primary) hypertension  2. Moderate aortic stenosis  3. Mixed hyperlipidemia  4. Hyperthyroidism         Subjective     Follow up medical issues      Review of Systems   Constitutional:  Negative for unexpected weight change.   Respiratory:  Negative for shortness of breath.    Musculoskeletal: Negative.    Psychiatric/Behavioral: Negative.     All other systems reviewed and are negative.      Past Medical History:   Diagnosis Date    High blood pressure      Past Surgical History:   Procedure Laterality Date    LEG SURGERY Right     inserted ebenezer 2021     History reviewed. No pertinent family history.  Social History     Socioeconomic History    Marital status: /Civil Union     Spouse name: None    Number of children: None    Years of education: None    Highest education level: None   Occupational History    None   Tobacco Use    Smoking status: Former     Current packs/day: 0.00     Types: Cigarettes     Quit date:      Years since quittin.5    Smokeless tobacco: Never   Vaping Use    Vaping status: Never Used   Substance and Sexual Activity    Alcohol use: Not Currently    Drug use: Not Currently    Sexual activity: None   Other Topics Concern    None   Social History Narrative    None     Social Determinants of Health     Financial Resource Strain: Medium Risk (2023)    Overall Financial Resource Strain (CARDIA)     Difficulty of Paying Living Expenses: Somewhat hard   Food Insecurity: No Food Insecurity (2024)    Hunger Vital Sign     Worried About Running Out of Food in the Last Year: Never true     Ran Out of Food in the Last Year: Never true   Transportation Needs: No Transportation Needs (2024)    PRAPARE - Transportation     Lack of  "Transportation (Medical): No     Lack of Transportation (Non-Medical): No   Physical Activity: Not on file   Stress: Not on file   Social Connections: Not on file   Intimate Partner Violence: Not on file   Housing Stability: Low Risk  (5/29/2024)    Housing Stability Vital Sign     Unable to Pay for Housing in the Last Year: No     Number of Times Moved in the Last Year: 1     Homeless in the Last Year: No     Current Outpatient Medications on File Prior to Visit   Medication Sig    calcium carbonate (Calcium 600) 600 MG tablet Take 600 mg by mouth 2 (two) times a day with meals    Multiple Vitamin (MULTIVITAMIN ADULT PO) Take 1 tablet by mouth daily     Allergies   Allergen Reactions    Citrus - Food Allergy Hives    Codeine Hives    Penicillin G Rash     Immunization History   Administered Date(s) Administered    COVID-19 PFIZER VACCINE 0.3 ML IM 04/01/2021, 04/22/2021, 11/10/2021    Influenza, high dose seasonal 0.7 mL 12/25/2021, 11/25/2022    Influenza, seasonal, injectable 02/15/2022       Objective     /79 (BP Location: Left arm, Patient Position: Sitting, Cuff Size: Standard)   Pulse 90   Temp 98.3 °F (36.8 °C) (Tympanic)   Ht 5' 4\" (1.626 m)   Wt 67.5 kg (148 lb 12.8 oz)   SpO2 99%   BMI 25.54 kg/m²     Physical Exam  Cardiovascular:      Heart sounds: Murmur heard.   Musculoskeletal:      Right lower leg: No edema.      Left lower leg: No edema.   Neurological:      Mental Status: She is oriented to person, place, and time.       Nithin Carlson MD    "

## 2023-11-30 ENCOUNTER — TELEPHONE (OUTPATIENT)
Dept: FAMILY MEDICINE CLINIC | Facility: HOSPITAL | Age: 73
End: 2023-11-30

## 2023-11-30 NOTE — TELEPHONE ENCOUNTER
Fyi - patient realizes why she has lost weight. She had a lot of dental work done and was on soft foods for about 2 months - a lot of yogurt! No call back needed.

## 2024-05-29 ENCOUNTER — OFFICE VISIT (OUTPATIENT)
Dept: FAMILY MEDICINE CLINIC | Facility: HOSPITAL | Age: 74
End: 2024-05-29
Payer: MEDICARE

## 2024-05-29 VITALS
DIASTOLIC BLOOD PRESSURE: 61 MMHG | HEIGHT: 64 IN | SYSTOLIC BLOOD PRESSURE: 111 MMHG | OXYGEN SATURATION: 98 % | BODY MASS INDEX: 21.1 KG/M2 | WEIGHT: 123.6 LBS | HEART RATE: 97 BPM | TEMPERATURE: 98.5 F

## 2024-05-29 DIAGNOSIS — Z00.00 MEDICARE ANNUAL WELLNESS VISIT, SUBSEQUENT: Primary | ICD-10-CM

## 2024-05-29 DIAGNOSIS — I10 ESSENTIAL (PRIMARY) HYPERTENSION: ICD-10-CM

## 2024-05-29 DIAGNOSIS — E05.90 HYPERTHYROIDISM: ICD-10-CM

## 2024-05-29 DIAGNOSIS — E78.2 MIXED HYPERLIPIDEMIA: ICD-10-CM

## 2024-05-29 DIAGNOSIS — I35.0 MODERATE AORTIC STENOSIS: ICD-10-CM

## 2024-05-29 PROCEDURE — 99214 OFFICE O/P EST MOD 30 MIN: CPT | Performed by: FAMILY MEDICINE

## 2024-05-29 PROCEDURE — G0438 PPPS, INITIAL VISIT: HCPCS | Performed by: FAMILY MEDICINE

## 2024-05-29 RX ORDER — SIMVASTATIN 10 MG
10 TABLET ORAL
Qty: 90 TABLET | Refills: 3 | Status: SHIPPED | OUTPATIENT
Start: 2024-05-29

## 2024-05-29 RX ORDER — AMLODIPINE BESYLATE 10 MG/1
10 TABLET ORAL DAILY
Qty: 90 TABLET | Refills: 3 | Status: SHIPPED | OUTPATIENT
Start: 2024-05-29

## 2024-05-29 NOTE — PATIENT INSTRUCTIONS
Medicare Preventive Visit Patient Instructions  Thank you for completing your Welcome to Medicare Visit or Medicare Annual Wellness Visit today. Your next wellness visit will be due in one year (5/30/2025).  The screening/preventive services that you may require over the next 5-10 years are detailed below. Some tests may not apply to you based off risk factors and/or age. Screening tests ordered at today's visit but not completed yet may show as past due. Also, please note that scanned in results may not display below.  Preventive Screenings:  Service Recommendations Previous Testing/Comments   Colorectal Cancer Screening  * Colonoscopy    * Fecal Occult Blood Test (FOBT)/Fecal Immunochemical Test (FIT)  * Fecal DNA/Cologuard Test  * Flexible Sigmoidoscopy Age: 45-75 years old   Colonoscopy: every 10 years (may be performed more frequently if at higher risk)  OR  FOBT/FIT: every 1 year  OR  Cologuard: every 3 years  OR  Sigmoidoscopy: every 5 years  Screening may be recommended earlier than age 45 if at higher risk for colorectal cancer. Also, an individualized decision between you and your healthcare provider will decide whether screening between the ages of 76-85 would be appropriate. Colonoscopy: Not on file  FOBT/FIT: Not on file  Cologuard: Not on file  Sigmoidoscopy: Not on file          Breast Cancer Screening Age: 40+ years old  Frequency: every 1-2 years  Not required if history of left and right mastectomy Mammogram: Not on file        Cervical Cancer Screening Between the ages of 21-29, pap smear recommended once every 3 years.   Between the ages of 30-65, can perform pap smear with HPV co-testing every 5 years.   Recommendations may differ for women with a history of total hysterectomy, cervical cancer, or abnormal pap smears in past. Pap Smear: Not on file    Screening Not Indicated   Hepatitis C Screening Once for adults born between 1945 and 1965  More frequently in patients at high risk for Hepatitis  C Hep C Antibody: Not on file        Diabetes Screening 1-2 times per year if you're at risk for diabetes or have pre-diabetes Fasting glucose: 93 mg/dL (5/26/2023)  A1C: No results in last 5 years (No results in last 5 years)      Cholesterol Screening Once every 5 years if you don't have a lipid disorder. May order more often based on risk factors. Lipid panel: 05/26/2023    Screening Not Indicated  History Lipid Disorder     Other Preventive Screenings Covered by Medicare:  Abdominal Aortic Aneurysm (AAA) Screening: covered once if your at risk. You're considered to be at risk if you have a family history of AAA.  Lung Cancer Screening: covers low dose CT scan once per year if you meet all of the following conditions: (1) Age 55-77; (2) No signs or symptoms of lung cancer; (3) Current smoker or have quit smoking within the last 15 years; (4) You have a tobacco smoking history of at least 20 pack years (packs per day multiplied by number of years you smoked); (5) You get a written order from a healthcare provider.  Glaucoma Screening: covered annually if you're considered high risk: (1) You have diabetes OR (2) Family history of glaucoma OR (3)  aged 50 and older OR (4)  American aged 65 and older  Osteoporosis Screening: covered every 2 years if you meet one of the following conditions: (1) You're estrogen deficient and at risk for osteoporosis based off medical history and other findings; (2) Have a vertebral abnormality; (3) On glucocorticoid therapy for more than 3 months; (4) Have primary hyperparathyroidism; (5) On osteoporosis medications and need to assess response to drug therapy.   Last bone density test (DXA Scan): Not on file.  HIV Screening: covered annually if you're between the age of 15-65. Also covered annually if you are younger than 15 and older than 65 with risk factors for HIV infection. For pregnant patients, it is covered up to 3 times per  pregnancy.    Immunizations:  Immunization Recommendations   Influenza Vaccine Annual influenza vaccination during flu season is recommended for all persons aged >= 6 months who do not have contraindications   Pneumococcal Vaccine   * Pneumococcal conjugate vaccine = PCV13 (Prevnar 13), PCV15 (Vaxneuvance), PCV20 (Prevnar 20)  * Pneumococcal polysaccharide vaccine = PPSV23 (Pneumovax) Adults 19-63 yo with certain risk factors or if 65+ yo  If never received any pneumonia vaccine: recommend Prevnar 20 (PCV20)  Give PCV20 if previously received 1 dose of PCV13 or PPSV23   Hepatitis B Vaccine 3 dose series if at intermediate or high risk (ex: diabetes, end stage renal disease, liver disease)   Respiratory syncytial virus (RSV) Vaccine - COVERED BY MEDICARE PART D  * RSVPreF3 (Arexvy) CDC recommends that adults 60 years of age and older may receive a single dose of RSV vaccine using shared clinical decision-making (SCDM)   Tetanus (Td) Vaccine - COST NOT COVERED BY MEDICARE PART B Following completion of primary series, a booster dose should be given every 10 years to maintain immunity against tetanus. Td may also be given as tetanus wound prophylaxis.   Tdap Vaccine - COST NOT COVERED BY MEDICARE PART B Recommended at least once for all adults. For pregnant patients, recommended with each pregnancy.   Shingles Vaccine (Shingrix) - COST NOT COVERED BY MEDICARE PART B  2 shot series recommended in those 19 years and older who have or will have weakened immune systems or those 50 years and older     Health Maintenance Due:      Topic Date Due   • Hepatitis C Screening  Never done     Immunizations Due:      Topic Date Due   • Pneumococcal Vaccine: 65+ Years (1 of 1 - PCV) Never done   • COVID-19 Vaccine (4 - 2023-24 season) 09/01/2023     Advance Directives   What are advance directives?  Advance directives are legal documents that state your wishes and plans for medical care. These plans are made ahead of time in case  you lose your ability to make decisions for yourself. Advance directives can apply to any medical decision, such as the treatments you want, and if you want to donate organs.   What are the types of advance directives?  There are many types of advance directives, and each state has rules about how to use them. You may choose a combination of any of the following:  Living will:  This is a written record of the treatment you want. You can also choose which treatments you do not want, which to limit, and which to stop at a certain time. This includes surgery, medicine, IV fluid, and tube feedings.   Durable power of  for healthcare (DPAHC):  This is a written record that states who you want to make healthcare choices for you when you are unable to make them for yourself. This person, called a proxy, is usually a family member or a friend. You may choose more than 1 proxy.  Do not resuscitate (DNR) order:  A DNR order is used in case your heart stops beating or you stop breathing. It is a request not to have certain forms of treatment, such as CPR. A DNR order may be included in other types of advance directives.  Medical directive:  This covers the care that you want if you are in a coma, near death, or unable to make decisions for yourself. You can list the treatments you want for each condition. Treatment may include pain medicine, surgery, blood transfusions, dialysis, IV or tube feedings, and a ventilator (breathing machine).  Values history:  This document has questions about your views, beliefs, and how you feel and think about life. This information can help others choose the care that you would choose.  Why are advance directives important?  An advance directive helps you control your care. Although spoken wishes may be used, it is better to have your wishes written down. Spoken wishes can be misunderstood, or not followed. Treatments may be given even if you do not want them. An advance directive may  make it easier for your family to make difficult choices about your care.       © Copyright [employer] 2018 Information is for End User's use only and may not be sold, redistributed or otherwise used for commercial purposes. All illustrations and images included in CareNotes® are the copyrighted property of [employer] or [employer]

## 2024-05-29 NOTE — PROGRESS NOTES
Ambulatory Visit  Name: Nallely Peacock      : 1950      MRN: [id number]  Encounter Provider: Nithin Carlson MD  Encounter Date: 2024   Encounter department: [facility] CARE [facility] [address]     Assessment & Plan   1. Medicare annual wellness visit, subsequent  2. Moderate aortic stenosis  Assessment & Plan:  Will recommend repeat TTE to follow progression  3. Hyperthyroidism  Assessment & Plan:  Reprinted thyroid labs to do now  4. Essential (primary) hypertension  Assessment & Plan:  Very good control  Orders:  -     amLODIPine (NORVASC) 10 mg tablet; Take 1 tablet (10 mg total) by mouth daily  -     CBC and differential; Future  -     Comprehensive metabolic panel; Future  5. Mixed hyperlipidemia  -     simvastatin (ZOCOR) 10 mg tablet; Take 1 tablet (10 mg total) by mouth daily at bedtime  -     Lipid Panel with Direct LDL reflex; Future      Depression Screening and Follow-up Plan: Patient was screened for depression during today's encounter. They screened negative with a PHQ-2 score of 1.      Preventive health issues were discussed with patient, and age appropriate screening tests were ordered as noted in patient's After Visit Summary. Personalized health advice and appropriate referrals for health education or preventive services given if needed, as noted in patient's After Visit Summary.    History of Present Illness     AWV and follow up medical issues    Mourns for her   of 56 years    Aware of weight loss, will get more protein in her diet  Discussed possible role of hyperthyroid in her weight loss    Discussed history of hyperthyroid and need to keep up with blood tests and to follow with Endocrinology    Having pain in lower abdomen/suprapubic area  No dysuria or bowel issues       Patient Care Team:  Nithin Carlson MD as PCP - General (Family Medicine)  Maira Rob MD as PCP - Endocrinology (Endocrinology)    Review of Systems   Constitutional:  Positive  for unexpected weight change. Negative for fever.   HENT: Negative.     Respiratory: Negative.     Cardiovascular: Negative.    Gastrointestinal: Negative.    Genitourinary: Negative.    Musculoskeletal: Negative.    Psychiatric/Behavioral: Negative.     All other systems reviewed and are negative.    Medical History Reviewed by provider this encounter:  Tobacco  Allergies  Meds  Problems  Med Hx  Surg Hx  Fam Hx       Annual Wellness Visit Questionnaire   Last Medicare Wellness visit information reviewed, patient interviewed and updates made to the record today.      Health Risk Assessment:   Patient rates overall health as very good. Patient feels that their physical health rating is same. Patient is satisfied with their life. Eyesight was rated as same. Hearing was rated as same. Patient feels that their emotional and mental health rating is same. Patients states they are never, rarely angry. Patient states they are never, rarely unusually tired/fatigued. Pain experienced in the last 7 days has been some. Patient's pain rating has been 2/10. Patient states that she has experienced no weight loss or gain in last 6 months.     Depression Screening:   PHQ-2 Score: 1      Fall Risk Screening:   In the past year, patient has experienced: no history of falling in past year      Urinary Incontinence Screening:   Patient has not leaked urine accidently in the last six months.     Home Safety:  Patient does not have trouble with stairs inside or outside of their home. Patient has working smoke alarms and has working carbon monoxide detector. Home safety hazards include: none.     Nutrition:   Current diet is Regular.     Medications:   Patient is not currently taking any over-the-counter supplements. Patient is able to manage medications.     Activities of Daily Living (ADLs)/Instrumental Activities of Daily Living (IADLs):   Walk and transfer into and out of bed and chair?: Yes  Dress and groom yourself?: Yes     Bathe or shower yourself?: Yes    Feed yourself? Yes  Do your laundry/housekeeping?: Yes  Manage your money, pay your bills and track your expenses?: Yes  Make your own meals?: Yes    Do your own shopping?: Yes    Previous Hospitalizations:   Any hospitalizations or ED visits within the last 12 months?: No      Advance Care Planning:   Living will: Yes    Durable POA for healthcare: Yes    Advanced directive: Yes    Advanced directive counseling given: Yes    Five wishes given: No    Patient declined ACP directive: No    End of Life Decisions reviewed with patient: Yes    Provider agrees with end of life decisions: Yes      Cognitive Screening:   Provider or family/friend/caregiver concerned regarding cognition?: No    PREVENTIVE SCREENINGS      Cardiovascular Screening:    General: Screening Not Indicated and History Lipid Disorder      Diabetes Screening:     General: Risks and Benefits Discussed    Due for: Blood Glucose      Colorectal Cancer Screening:     General: Screening Not Indicated      Breast Cancer Screening:     General: Risks and Benefits Discussed    Due for: Mammogram        Cervical Cancer Screening:    General: Screening Not Indicated      Osteoporosis Screening:    General: Risks and Benefits Discussed    Due for: DXA Axial      Abdominal Aortic Aneurysm (AAA) Screening:        General: Screening Not Indicated      Lung Cancer Screening:     General: Screening Not Indicated      Hepatitis C Screening:    General: Risks and Benefits Discussed    Hep C Screening Accepted: Yes      Screening, Brief Intervention, and Referral to Treatment (SBIRT)    Screening  Typical number of drinks in a day: 0  Typical number of drinks in a week: 0  Interpretation: Low risk drinking behavior.    Single Item Drug Screening:  How often have you used an illegal drug (including marijuana) or a prescription medication for non-medical reasons in the past year? never    Single Item Drug Screen Score:  "0  Interpretation: Negative screen for possible drug use disorder    Social Determinants of Health     Financial Resource Strain: Medium Risk (5/23/2023)    Overall Financial Resource Strain (CARDIA)     Difficulty of Paying Living Expenses: Somewhat hard   Food Insecurity: No Food Insecurity (5/29/2024)    Hunger Vital Sign     Worried About Running Out of Food in the Last Year: Never true     Ran Out of Food in the Last Year: Never true   Transportation Needs: No Transportation Needs (5/29/2024)    PRAPARE - Transportation     Lack of Transportation (Medical): No     Lack of Transportation (Non-Medical): No   Housing Stability: Low Risk  (5/29/2024)    Housing Stability Vital Sign     Unable to Pay for Housing in the Last Year: No     Number of Times Moved in the Last Year: 1     Homeless in the Last Year: No   Utilities: Not At Risk (5/29/2024)    [facility] Utilities     Threatened with loss of utilities: No     No results found.    Objective     /61 (BP Location: Left arm, Patient Position: Sitting, Cuff Size: Large)   Pulse 97   Temp 98.5 °F (36.9 °C) (Tympanic)   Ht 5' 4\" (1.626 m)   Wt 56.1 kg (123 lb 9.6 oz)   SpO2 98%   BMI 21.22 kg/m²     Physical Exam  Vitals and nursing note reviewed.   Cardiovascular:      Rate and Rhythm: Normal rate and regular rhythm.      Pulses: Normal pulses.      Heart sounds: Murmur heard.   Pulmonary:      Effort: Pulmonary effort is normal.      Breath sounds: Normal breath sounds.   Abdominal:      General: Abdomen is flat. Bowel sounds are normal.      Tenderness: There is no abdominal tenderness.       Musculoskeletal:      Right lower leg: No edema.      Left lower leg: No edema.       Administrative Statements           "

## 2024-05-31 ENCOUNTER — APPOINTMENT (OUTPATIENT)
Dept: LAB | Facility: HOSPITAL | Age: 74
End: 2024-05-31
Payer: MEDICARE

## 2024-05-31 DIAGNOSIS — E78.2 MIXED HYPERLIPIDEMIA: ICD-10-CM

## 2024-05-31 DIAGNOSIS — I10 ESSENTIAL (PRIMARY) HYPERTENSION: ICD-10-CM

## 2024-05-31 LAB
ALBUMIN SERPL BCP-MCNC: 3.7 G/DL (ref 3.5–5)
ALP SERPL-CCNC: 71 U/L (ref 34–104)
ALT SERPL W P-5'-P-CCNC: 8 U/L (ref 7–52)
ANION GAP SERPL CALCULATED.3IONS-SCNC: 14 MMOL/L (ref 4–13)
AST SERPL W P-5'-P-CCNC: 18 U/L (ref 13–39)
BASOPHILS # BLD AUTO: 0.05 THOUSANDS/ÂΜL (ref 0–0.1)
BASOPHILS NFR BLD AUTO: 1 % (ref 0–1)
BILIRUB SERPL-MCNC: 0.41 MG/DL (ref 0.2–1)
BUN SERPL-MCNC: 12 MG/DL (ref 5–25)
CALCIUM SERPL-MCNC: 10.4 MG/DL (ref 8.4–10.2)
CHLORIDE SERPL-SCNC: 102 MMOL/L (ref 96–108)
CHOLEST SERPL-MCNC: 145 MG/DL
CO2 SERPL-SCNC: 25 MMOL/L (ref 21–32)
CREAT SERPL-MCNC: 0.72 MG/DL (ref 0.6–1.3)
EOSINOPHIL # BLD AUTO: 0.07 THOUSAND/ÂΜL (ref 0–0.61)
EOSINOPHIL NFR BLD AUTO: 1 % (ref 0–6)
ERYTHROCYTE [DISTWIDTH] IN BLOOD BY AUTOMATED COUNT: 12.9 % (ref 11.6–15.1)
GFR SERPL CREATININE-BSD FRML MDRD: 81 ML/MIN/1.73SQ M
GLUCOSE P FAST SERPL-MCNC: 98 MG/DL (ref 65–99)
HCT VFR BLD AUTO: 38 % (ref 34.8–46.1)
HDLC SERPL-MCNC: 63 MG/DL
HGB BLD-MCNC: 12.1 G/DL (ref 11.5–15.4)
IMM GRANULOCYTES # BLD AUTO: 0.03 THOUSAND/UL (ref 0–0.2)
IMM GRANULOCYTES NFR BLD AUTO: 0 % (ref 0–2)
LDLC SERPL CALC-MCNC: 61 MG/DL (ref 0–100)
LYMPHOCYTES # BLD AUTO: 0.98 THOUSANDS/ÂΜL (ref 0.6–4.47)
LYMPHOCYTES NFR BLD AUTO: 13 % (ref 14–44)
MCH RBC QN AUTO: 28.9 PG (ref 26.8–34.3)
MCHC RBC AUTO-ENTMCNC: 31.8 G/DL (ref 31.4–37.4)
MCV RBC AUTO: 91 FL (ref 82–98)
MONOCYTES # BLD AUTO: 0.5 THOUSAND/ÂΜL (ref 0.17–1.22)
MONOCYTES NFR BLD AUTO: 7 % (ref 4–12)
NEUTROPHILS # BLD AUTO: 6.12 THOUSANDS/ÂΜL (ref 1.85–7.62)
NEUTS SEG NFR BLD AUTO: 78 % (ref 43–75)
NRBC BLD AUTO-RTO: 0 /100 WBCS
PLATELET # BLD AUTO: 257 THOUSANDS/UL (ref 149–390)
PMV BLD AUTO: 11.4 FL (ref 8.9–12.7)
POTASSIUM SERPL-SCNC: 5 MMOL/L (ref 3.5–5.3)
PROT SERPL-MCNC: 7.3 G/DL (ref 6.4–8.4)
RBC # BLD AUTO: 4.18 MILLION/UL (ref 3.81–5.12)
SODIUM SERPL-SCNC: 141 MMOL/L (ref 135–147)
TRIGL SERPL-MCNC: 107 MG/DL
WBC # BLD AUTO: 7.75 THOUSAND/UL (ref 4.31–10.16)

## 2024-05-31 PROCEDURE — 80053 COMPREHEN METABOLIC PANEL: CPT

## 2024-05-31 PROCEDURE — 85025 COMPLETE CBC W/AUTO DIFF WBC: CPT

## 2024-05-31 PROCEDURE — 80061 LIPID PANEL: CPT

## 2024-06-03 LAB — TSI SER-ACNC: 4.05 IU/L (ref 0–0.55)

## 2024-06-22 ENCOUNTER — HOSPITAL ENCOUNTER (EMERGENCY)
Facility: HOSPITAL | Age: 74
Discharge: HOME | End: 2024-06-22
Attending: EMERGENCY MEDICINE
Payer: MEDICARE

## 2024-06-22 ENCOUNTER — APPOINTMENT (EMERGENCY)
Dept: ULTRASOUND IMAGING | Facility: HOSPITAL | Age: 74
End: 2024-06-22
Payer: MEDICARE

## 2024-06-22 VITALS
RESPIRATION RATE: 17 BRPM | DIASTOLIC BLOOD PRESSURE: 62 MMHG | HEART RATE: 86 BPM | SYSTOLIC BLOOD PRESSURE: 120 MMHG | TEMPERATURE: 98.1 F | OXYGEN SATURATION: 99 %

## 2024-06-22 DIAGNOSIS — N93.9 ABNORMAL UTERINE BLEEDING: Primary | ICD-10-CM

## 2024-06-22 LAB
ALBUMIN SERPL BCG-MCNC: 3.6 G/DL (ref 3.5–5)
ALP SERPL-CCNC: 69 U/L (ref 34–104)
ALT SERPL W P-5'-P-CCNC: 6 U/L (ref 7–52)
ANION GAP SERPL CALCULATED.3IONS-SCNC: 9 MMOL/L (ref 4–13)
AST SERPL W P-5'-P-CCNC: 15 U/L (ref 13–39)
BASOPHILS # BLD AUTO: 0.03 THOUSANDS/ÂΜL (ref 0–0.1)
BASOPHILS NFR BLD AUTO: 0 % (ref 0–1)
BILIRUB SERPL-MCNC: 0.45 MG/DL (ref 0.2–1)
BUN SERPL-MCNC: 16 MG/DL (ref 5–25)
CALCIUM SERPL-MCNC: 9.5 MG/DL (ref 8.4–10.2)
CHLORIDE SERPL-SCNC: 105 MMOL/L (ref 96–108)
CO2 SERPL-SCNC: 24 MMOL/L (ref 21–32)
CREAT SERPL-MCNC: 0.79 MG/DL (ref 0.6–1.3)
EOSINOPHIL # BLD AUTO: 0.02 THOUSAND/ÂΜL (ref 0–0.61)
EOSINOPHIL NFR BLD AUTO: 0 % (ref 0–6)
ERYTHROCYTE [DISTWIDTH] IN BLOOD BY AUTOMATED COUNT: 13 % (ref 11.6–15.1)
GFR SERPL CREATININE-BSD FRML MDRD: 72 ML/MIN/1.73SQ M
GLUCOSE SERPL-MCNC: 112 MG/DL (ref 65–140)
HCT VFR BLD AUTO: 35.2 % (ref 34.8–46.1)
HGB BLD-MCNC: 11.5 G/DL (ref 11.5–15.4)
IMM GRANULOCYTES # BLD AUTO: 0.02 THOUSAND/UL (ref 0–0.2)
IMM GRANULOCYTES NFR BLD AUTO: 0 % (ref 0–2)
LIPASE SERPL-CCNC: 44 U/L (ref 11–82)
LYMPHOCYTES # BLD AUTO: 0.79 THOUSANDS/ÂΜL (ref 0.6–4.47)
LYMPHOCYTES NFR BLD AUTO: 10 % (ref 14–44)
MCH RBC QN AUTO: 29 PG (ref 26.8–34.3)
MCHC RBC AUTO-ENTMCNC: 32.7 G/DL (ref 31.4–37.4)
MCV RBC AUTO: 89 FL (ref 82–98)
MONOCYTES # BLD AUTO: 0.48 THOUSAND/ÂΜL (ref 0.17–1.22)
MONOCYTES NFR BLD AUTO: 6 % (ref 4–12)
NEUTROPHILS # BLD AUTO: 6.5 THOUSANDS/ÂΜL (ref 1.85–7.62)
NEUTS SEG NFR BLD AUTO: 84 % (ref 43–75)
NRBC BLD AUTO-RTO: 0 /100 WBCS
PLATELET # BLD AUTO: 267 THOUSANDS/UL (ref 149–390)
PMV BLD AUTO: 9.9 FL (ref 8.9–12.7)
POTASSIUM SERPL-SCNC: 3.4 MMOL/L (ref 3.5–5.3)
PROT SERPL-MCNC: 7 G/DL (ref 6.4–8.4)
RBC # BLD AUTO: 3.97 MILLION/UL (ref 3.81–5.12)
SODIUM SERPL-SCNC: 138 MMOL/L (ref 135–147)
WBC # BLD AUTO: 7.84 THOUSAND/UL (ref 4.31–10.16)

## 2024-06-22 PROCEDURE — 76856 US EXAM PELVIC COMPLETE: CPT

## 2024-06-22 PROCEDURE — 36415 COLL VENOUS BLD VENIPUNCTURE: CPT | Performed by: EMERGENCY MEDICINE

## 2024-06-22 PROCEDURE — 99284 EMERGENCY DEPT VISIT MOD MDM: CPT | Performed by: EMERGENCY MEDICINE

## 2024-06-22 PROCEDURE — 80053 COMPREHEN METABOLIC PANEL: CPT | Performed by: EMERGENCY MEDICINE

## 2024-06-22 PROCEDURE — 85025 COMPLETE CBC W/AUTO DIFF WBC: CPT | Performed by: EMERGENCY MEDICINE

## 2024-06-22 PROCEDURE — 99285 EMERGENCY DEPT VISIT HI MDM: CPT

## 2024-06-22 PROCEDURE — 83690 ASSAY OF LIPASE: CPT | Performed by: EMERGENCY MEDICINE

## 2024-06-22 PROCEDURE — 76830 TRANSVAGINAL US NON-OB: CPT

## 2024-06-22 NOTE — DISCHARGE INSTRUCTIONS
Diffusely enlarged and heterogeneous uterus with lobulated contour. This may represent a combination of adenomyosis and fibroids though difficult to delineate discrete lesions. Endometrium is not discretely visualized and endometrial pathology including malignancy is not excluded. This would be better evaluated by nonemergent MRI and follow-up with OB/GYN for endometrial biopsy is recommended.

## 2024-06-23 NOTE — ED PROVIDER NOTES
History  Chief Complaint   Patient presents with    Vaginal Bleeding     Pt with vaginal bleeding x 1 month, has been unable to get care due to taking care of her  denies dizziness, denies headache, denies nausea, reports lower abdominal pain for sometime, unsure of the length      74-year-old female presents for evaluation of vaginal bleeding for the last month.  Patient denies heavy bleeding.  She reports seeing a small amount on the toilet paper when wiping.  Denies any blood in her urine.  Denies dysuria, frequency, urgency.  Denies any rectal bleeding or blood in the toilet paper.  Denies abdominal pain.        Prior to Admission Medications   Prescriptions Last Dose Informant Patient Reported? Taking?   Multiple Vitamin (MULTIVITAMIN ADULT PO)   Yes No   Sig: Take 1 tablet by mouth daily   amLODIPine (NORVASC) 10 mg tablet   No No   Sig: Take 1 tablet (10 mg total) by mouth daily   calcium carbonate (Calcium 600) 600 MG tablet   Yes No   Sig: Take 600 mg by mouth 2 (two) times a day with meals   simvastatin (ZOCOR) 10 mg tablet   No No   Sig: Take 1 tablet (10 mg total) by mouth daily at bedtime      Facility-Administered Medications: None       Past Medical History:   Diagnosis Date    High blood pressure        Past Surgical History:   Procedure Laterality Date    LEG SURGERY Right     inserted ebenezer 2021       History reviewed. No pertinent family history.  I have reviewed and agree with the history as documented.    E-Cigarette/Vaping    E-Cigarette Use Never User      E-Cigarette/Vaping Substances     Social History     Tobacco Use    Smoking status: Former     Current packs/day: 0.00     Types: Cigarettes     Quit date:      Years since quittin.5    Smokeless tobacco: Never   Vaping Use    Vaping status: Never Used   Substance Use Topics    Alcohol use: Not Currently    Drug use: Not Currently       Review of Systems   Genitourinary:  Positive for vaginal bleeding.       Physical  Exam  Physical Exam  Vitals and nursing note reviewed.   Constitutional:       Appearance: She is well-developed.   HENT:      Head: Normocephalic and atraumatic.      Right Ear: External ear normal.      Left Ear: External ear normal.      Nose: Nose normal.   Eyes:      General: No scleral icterus.  Cardiovascular:      Rate and Rhythm: Normal rate.   Pulmonary:      Effort: Pulmonary effort is normal. No respiratory distress.   Abdominal:      General: There is no distension.   Genitourinary:     General: Normal vulva.      Vagina: No vaginal discharge.   Musculoskeletal:         General: No deformity. Normal range of motion.      Cervical back: Normal range of motion.   Skin:     Findings: No rash.   Neurological:      General: No focal deficit present.      Mental Status: She is alert and oriented to person, place, and time.   Psychiatric:         Mood and Affect: Mood normal.         Vital Signs  ED Triage Vitals   Temperature Pulse Respirations Blood Pressure SpO2   06/22/24 1107 06/22/24 1115 06/22/24 1107 06/22/24 1107 06/22/24 1115   98.1 °F (36.7 °C) 99 20 (!) 171/93 99 %      Temp Source Heart Rate Source Patient Position - Orthostatic VS BP Location FiO2 (%)   06/22/24 1107 06/22/24 1107 06/22/24 1107 06/22/24 1107 --   Oral Monitor Sitting Right arm       Pain Score       06/22/24 1107       6           Vitals:    06/22/24 1107 06/22/24 1115 06/22/24 1145 06/22/24 1200   BP: (!) 171/93 (!) 171/93 114/68 120/62   Pulse:  99 90 86   Patient Position - Orthostatic VS: Sitting            Visual Acuity      ED Medications  Medications - No data to display    Diagnostic Studies  Results Reviewed       Procedure Component Value Units Date/Time    CMP [[id number]]  (Abnormal) Collected: 06/22/24 1126    Lab Status: Final result Specimen: Blood from Arm, Left Updated: 06/22/24 1208     Sodium 138 mmol/L      Potassium 3.4 mmol/L      Chloride 105 mmol/L      CO2 24 mmol/L      ANION GAP 9 mmol/L      BUN 16  mg/dL      Creatinine 0.79 mg/dL      Glucose 112 mg/dL      Calcium 9.5 mg/dL      AST 15 U/L      ALT 6 U/L      Alkaline Phosphatase 69 U/L      Total Protein 7.0 g/dL      Albumin 3.6 g/dL      Total Bilirubin 0.45 mg/dL      eGFR 72 ml/min/1.73sq m     Narrative:      National Kidney Disease Foundation guidelines for Chronic Kidney Disease (CKD):     Stage 1 with normal or high GFR (GFR > 90 mL/min/1.73 square meters)    Stage 2 Mild CKD (GFR = 60-89 mL/min/1.73 square meters)    Stage 3A Moderate CKD (GFR = 45-59 mL/min/1.73 square meters)    Stage 3B Moderate CKD (GFR = 30-44 mL/min/1.73 square meters)    Stage 4 Severe CKD (GFR = 15-29 mL/min/1.73 square meters)    Stage 5 End Stage CKD (GFR <15 mL/min/1.73 square meters)  Note: GFR calculation is accurate only with a steady state creatinine    Lipase [[id number]]  (Normal) Collected: 06/22/24 1126    Lab Status: Final result Specimen: Blood from Arm, Left Updated: 06/22/24 1208     Lipase 44 u/L     CBC and differential [[id number]]  (Abnormal) Collected: 06/22/24 1126    Lab Status: Final result Specimen: Blood from Arm, Left Updated: 06/22/24 1150     WBC 7.84 Thousand/uL      RBC 3.97 Million/uL      Hemoglobin 11.5 g/dL      Hematocrit 35.2 %      MCV 89 fL      MCH 29.0 pg      MCHC 32.7 g/dL      RDW 13.0 %      MPV 9.9 fL      Platelets 267 Thousands/uL      nRBC 0 /100 WBCs      Segmented % 84 %      Immature Grans % 0 %      Lymphocytes % 10 %      Monocytes % 6 %      Eosinophils Relative 0 %      Basophils Relative 0 %      Absolute Neutrophils 6.50 Thousands/µL      Absolute Immature Grans 0.02 Thousand/uL      Absolute Lymphocytes 0.79 Thousands/µL      Absolute Monocytes 0.48 Thousand/µL      Eosinophils Absolute 0.02 Thousand/µL      Basophils Absolute 0.03 Thousands/µL                    US pelvis complete w transvaginal   Final Result by Shay Guevara MD (06/22 5225)      1.  Diffusely enlarged and heterogeneous uterus with lobulated  contour. This may represent a combination of adenomyosis and fibroids though difficult to delineate discrete lesions. Endometrium is not discretely visualized and endometrial pathology    including malignancy is not excluded. This would be better evaluated by nonemergent MRI and follow-up with OB/GYN for endometrial biopsy is recommended.      2.  Ovaries not visualized.      The study was marked in EPIC for immediate notification.                           Workstation performed: [id number]                    Procedures  Procedures         ED Course                               SBIRT 20yo+      Flowsheet Row Most Recent Value   Initial Alcohol Screen: US AUDIT-C     1. How often do you have a drink containing alcohol? 0 Filed at: 06/22/2024 1115   2. How many drinks containing alcohol do you have on a typical day you are drinking?  0 Filed at: 06/22/2024 1115   3a. Male UNDER 65: How often do you have five or more drinks on one occasion? 0 Filed at: 06/22/2024 1115   3b. FEMALE Any Age, or MALE 65+: How often do you have 4 or more drinks on one occassion? 0 Filed at: 06/22/2024 1115   Audit-C Score 0 Filed at: 06/22/2024 1115   SHERMAN: How many times in the past year have you...    Used an illegal drug or used a prescription medication for non-medical reasons? Never Filed at: 06/22/2024 1115                      Medical Decision Making  74-year-old female presenting with intermittent vaginal bleeding for the last month.  Obtain labs, ultrasound pelvis.    Discussed all results with patient.  Ambulatory referral for gynecology.  Close follow-up advised.    Amount and/or Complexity of Data Reviewed  Labs: ordered.  Radiology: ordered.             Disposition  Final diagnoses:   Abnormal uterine bleeding     Time reflects when diagnosis was documented in both MDM as applicable and the Disposition within this note       Time User Action Codes Description Comment    6/22/2024 12:48 PM Reji You Add [N93.9] Abnormal  uterine bleeding           ED Disposition       ED Disposition   Discharge    Condition   Stable    Date/Time   Sat Jun 22, 2024 1250    Comment   Nallely Peacock discharge to home/self care.                   Follow-up Information       Follow up With Specialties Details Why Contact Info Additional Information    [facility] [place] [facility]   [address]  [facility] [address] [id number] [phone] [facility] [place], [address], [place], [address], [phone]     [facility] Emergency Department Emergency Medicine  If symptoms worsen [address] [address] [phone] [facility] Emergency Department, [address] [place], [place] [address]            Discharge Medication List as of 6/22/2024 12:50 PM        CONTINUE these medications which have NOT CHANGED    Details   amLODIPine (NORVASC) 10 mg tablet Take 1 tablet (10 mg total) by mouth daily, Starting Wed 5/29/2024, Normal      calcium carbonate (Calcium 600) 600 MG tablet Take 600 mg by mouth 2 (two) times a day with meals, Historical Med      Multiple Vitamin (MULTIVITAMIN ADULT PO) Take 1 tablet by mouth daily, Historical Med      simvastatin (ZOCOR) 10 mg tablet Take 1 tablet (10 mg total) by mouth daily at bedtime, Starting Wed 5/29/2024, Normal                 PDMP Review       None            ED Provider  Electronically Signed by             Reji You DO  06/22/24 2055

## 2024-06-28 PROBLEM — Z00.00 MEDICARE ANNUAL WELLNESS VISIT, SUBSEQUENT: Status: RESOLVED | Noted: 2023-05-23 | Resolved: 2024-06-28

## 2024-07-02 ENCOUNTER — OFFICE VISIT (OUTPATIENT)
Dept: OBGYN CLINIC | Facility: CLINIC | Age: 74
End: 2024-07-02
Payer: MEDICARE

## 2024-07-02 VITALS
WEIGHT: 121 LBS | DIASTOLIC BLOOD PRESSURE: 54 MMHG | HEIGHT: 64 IN | BODY MASS INDEX: 20.66 KG/M2 | SYSTOLIC BLOOD PRESSURE: 110 MMHG

## 2024-07-02 DIAGNOSIS — N95.0 POSTMENOPAUSAL BLEEDING: Primary | ICD-10-CM

## 2024-07-02 DIAGNOSIS — N93.9 ABNORMAL UTERINE BLEEDING: ICD-10-CM

## 2024-07-02 PROCEDURE — 58100 BIOPSY OF UTERUS LINING: CPT | Performed by: NURSE PRACTITIONER

## 2024-07-02 PROCEDURE — 88342 IMHCHEM/IMCYTCHM 1ST ANTB: CPT | Performed by: PATHOLOGY

## 2024-07-02 PROCEDURE — 88341 IMHCHEM/IMCYTCHM EA ADD ANTB: CPT | Performed by: PATHOLOGY

## 2024-07-02 PROCEDURE — 99203 OFFICE O/P NEW LOW 30 MIN: CPT | Performed by: NURSE PRACTITIONER

## 2024-07-02 PROCEDURE — 88305 TISSUE EXAM BY PATHOLOGIST: CPT | Performed by: PATHOLOGY

## 2024-07-02 RX ORDER — ACETAMINOPHEN 160 MG/1
160 BAR, CHEWABLE ORAL EVERY 6 HOURS PRN
COMMUNITY

## 2024-07-02 NOTE — PROGRESS NOTES
[facility] [place]  [address] [place] [address]    Assessment/Plan:  1. Postmenopausal bleeding  -     MRI pelvis female wo and w contrast; Future; Expected date: 2024  -     Tissue Exam  2. Abnormal uterine bleeding  -     Ambulatory Referral to Obstetrics / Gynecology  -     MRI pelvis female wo and w contrast; Future; Expected date: 2024  -     Tissue Exam  MRI ordered to further evaluate pelvis  Will contact patient with pathology results    Subjective:   Nalleyl Peacock is a 74 y.o. No obstetric history on file. female.  CC: bleeding      HPI:   74 year old female presents for office visit. She is complaining of vaginal bleeding for about 6 weeks. Bleeding is light, mostly when wiping but sometimes in underwear. Also reports some abdominal tenderness. Normal appetite, normal bowel and bladder function. No history of abnormal pap smear. Recently lost  in April.  Good support from daughter/family. Pelvic ultrasound performed during recent ER visit, endometrial stripe not visualized, enlarged, heterogenous uterus with lobulated contour. Ovaries not visualized.     Vaginal Bleeding        Gyn History  No LMP recorded (lmp unknown). Patient is postmenopausal.       Last pap smear: Not on file    She  has no history on file for sexual activity.       OB History  No obstetric history on file.    Past Medical History:  No date: High blood pressure     Past Surgical History:  No date: LEG SURGERY; Right      Comment:  inserted ebenezer 2021     Social History     Tobacco Use    Smoking status: Former     Current packs/day: 0.00     Types: Cigarettes     Quit date: 1980     Years since quittin.5     Passive exposure: Never    Smokeless tobacco: Never   Vaping Use    Vaping status: Never Used   Substance Use Topics    Alcohol use: Not Currently    Drug use: Not Currently          Current Outpatient Medications:     acetaminophen (TYLENOL) 160 MG chewable tablet, Chew 160 mg  "every 6 (six) hours as needed for mild pain, Disp: , Rfl:     amLODIPine (NORVASC) 10 mg tablet, Take 1 tablet (10 mg total) by mouth daily, Disp: 90 tablet, Rfl: 3    calcium carbonate (Calcium 600) 600 MG tablet, Take 600 mg by mouth 2 (two) times a day with meals, Disp: , Rfl:     Multiple Vitamin (MULTIVITAMIN ADULT PO), Take 1 tablet by mouth daily, Disp: , Rfl:     simvastatin (ZOCOR) 10 mg tablet, Take 1 tablet (10 mg total) by mouth daily at bedtime, Disp: 90 tablet, Rfl: 3    She is allergic to citrus - food allergy, codeine, and penicillin g..    ROS: Review of Systems   Genitourinary:  Positive for vaginal bleeding.   See HPI for details    Objective:  /54 (BP Location: Left arm, Patient Position: Sitting, Cuff Size: Standard)   Ht 5' 4\" (1.626 m)   Wt 54.9 kg (121 lb)   LMP  (LMP Unknown)   BMI 20.77 kg/m²      Physical Exam  Constitutional:       General: She is not in acute distress.     Appearance: Normal appearance.   Abdominal:      Palpations: Abdomen is soft.      Tenderness: There is abdominal tenderness.      Comments: Very firm across lower abdomen, tender at midline.    Skin:     General: Skin is warm and dry.   Neurological:      Mental Status: She is alert.       Endometrial biopsy    Date/Time: 7/2/2024 1:00 PM    Performed by: DAY Fajardo  Authorized by: DAY Fajardo  Universal Protocol:  Procedure performed by: (Dr. Boss)  Consent: Verbal consent obtained. Written consent not obtained.  Risks and benefits: risks, benefits and alternatives were discussed  Consent given by: patient  Patient understanding: patient states understanding of the procedure being performed  Required items: required blood products, implants, devices, and special equipment available  Patient identity confirmed: verbally with patient    Indication:     Indications: Post-menopausal bleeding      Chronicity of post-menopausal bleeding:  Chronic (x 6 weeks)    Progression of " post-menopausal bleeding:  Unchanged  Procedure:     Procedure: endometrial biopsy with Pipelle      A bivalve speculum was placed in the vagina: yes      Cervix cleaned and prepped: yes      The cervix was dilated: no      Uterus sounded: no      Specimen collected: specimen collected and sent to pathology      Patient tolerated procedure well with no complications: yes    Findings:     Cervix: normal    Comments:     Procedure comments:  Moderate amount of clumpy, yellow/gray irregular tissue with small to moderate amount thin, dark red blood noted in vagina.

## 2024-07-03 ENCOUNTER — TELEPHONE (OUTPATIENT)
Age: 74
End: 2024-07-03

## 2024-07-03 NOTE — TELEPHONE ENCOUNTER
Patient called stating that she went to schedule her MRI and the next appointment they had was 7/24/24. The patient wants to know if this is too long to wait?

## 2024-07-08 PROCEDURE — 88341 IMHCHEM/IMCYTCHM EA ADD ANTB: CPT | Performed by: PATHOLOGY

## 2024-07-08 PROCEDURE — 88342 IMHCHEM/IMCYTCHM 1ST ANTB: CPT | Performed by: PATHOLOGY

## 2024-07-08 PROCEDURE — 88305 TISSUE EXAM BY PATHOLOGIST: CPT | Performed by: PATHOLOGY

## 2024-07-09 DIAGNOSIS — C54.1 ENDOMETRIAL CANCER DETERMINED BY UTERINE BIOPSY (HCC): Primary | ICD-10-CM

## 2024-07-09 NOTE — PROGRESS NOTES
Spoke with pt, reviewed pathology of endometrial biopsy. Referred pt to gyn oncology for further evaluation. Number given and electronic referral placed.

## 2024-07-10 ENCOUNTER — DOCUMENTATION (OUTPATIENT)
Dept: HEMATOLOGY ONCOLOGY | Facility: CLINIC | Age: 74
End: 2024-07-10

## 2024-07-10 ENCOUNTER — PATIENT OUTREACH (OUTPATIENT)
Dept: HEMATOLOGY ONCOLOGY | Facility: CLINIC | Age: 74
End: 2024-07-10

## 2024-07-10 DIAGNOSIS — C54.1 CARCINOMA OF ENDOMETRIUM (HCC): Primary | ICD-10-CM

## 2024-07-10 NOTE — PROGRESS NOTES
Chart rvw'd on  7/10/2024      Referred by:  Komal BERNSTEIN    Diagnosis:  Endometrial cancer determined by uterine biopsy    Imagin2024  US pelvis complete w transvaginal -  1.  Diffusely enlarged and heterogeneous uterus with lobulated contour. This may represent a combination of adenomyosis and fibroids though difficult to delineate discrete lesions. Endometrium is not discretely visualized and endometrial pathology   including malignancy is not excluded. This would be better evaluated by nonemergent MRI and follow-up with OB/GYN for endometrial biopsy is recommended.     2.  Ovaries not visualized.      Pathology:  2024-  A.  Endometrium:     - Entirely necrotic tissue precluding further evaluation.     B.  Endometrium:     - Extensively necrotic tissue with rare cluster of viable, atypical epithelial cells suspicious for malignancy with        features suggesting a high grade carcinoma.      Surgery:  N/A      Post surgical pathology:  N/A        Referral received/ Chart reviewed for work up completed     Imaging completed:  24 US completed at [facility]    Pathology completed:  2024 at [facility]    All records needed are in patients chart. No records retrieval needed at this time.      75 yo F who has PMB X6 wks , she had an endometrial bx performed and pelvic US. Confirmed suspected malignancy of the endometrium. She has been referred to GYN ONC for further eval and discussion.

## 2024-07-10 NOTE — PROGRESS NOTES
NN initial phone outreach, introduced myself and explained my role. D/w her that I performed a clinical eval of her chart and determined that she will need to be seen within 7 days w her confirmed CA dx. I explained that she would be rcving an additional call from the  to get her an appt in that time frame. I asked that she take down my contact info in case she does not get a call for an appt in the next 1-2 days. She took down my contact and understands. I asked her if she was aware of the MRI order that was placed by her referring provider, she states she is aware and sched on  at 330 PM at [facility]. I do not see that she is sched so I told her I would call CS to see if she is sched or not. I told her that she does not need the MRI and US and tissue is enough info for us for her to be seen in office. She was happy to hear this. I will call her back w confirmation of no MRI appt.    Completed general assessment.    Pt reports she still has vaginal bleeding (X6 weeks), not severe, pt using pads but she asked about using a tampon since she has her whole life. I told her that since the cancer has been noted in the endometrium and no issues w her vagina where the tampon will rest that will be ok. She said she will double check w the provider as well. Currently she has been using pads.     Pt's  recently  in April and she became upset about this when asking her about her living situation. Referral to  for emotional support.    She has a little lower abdominal pain rates 3/10 and Tylenol managing.     Pt thanked me for calling her today and was appreciative of my help. She has my contact number.         Call made to CS and pt is not sched for an MRI. I called her back to let her know this and she crossed it off her calendar.

## 2024-07-11 ENCOUNTER — PATIENT OUTREACH (OUTPATIENT)
Dept: HEMATOLOGY ONCOLOGY | Facility: CLINIC | Age: 74
End: 2024-07-11

## 2024-07-11 ENCOUNTER — TELEPHONE (OUTPATIENT)
Age: 74
End: 2024-07-11

## 2024-07-11 NOTE — TELEPHONE ENCOUNTER
I called Nallely in response to a referral that was received for patient to establish care with Gyneologic Oncology    Outreach was made to schedule a consultation.    A consultation was scheduled for patient during this call. Patient is scheduled on 8/1/24 at 11:15 AM with Dr Jacome at the [facility]      Referral notes state to schedule within 7 days for diagnosed cancer. Pt requested to only be seen at [facility]. Pt scheduled for first available appt at that location. Please advise if appt can be made sooner at [facility] per pt request.

## 2024-07-11 NOTE — PROGRESS NOTES
Incoming phone call rcvd from the pt asking about tampon use since she cont having vaginal bleeding. I explained that as long as she did not have sx in that region she is OK to use tampons. She denies having sx. I see that the pt has been sched for her consult on 8/1 and confirmed that this appt has been sent to the leadership pool to get her rescheduled sooner since she needs to be seen asap. I asked that she call me Monday if she has not been rescheduled by then, she said she will. She was very appreciative of my time and thanked me.

## 2024-07-15 ENCOUNTER — PATIENT OUTREACH (OUTPATIENT)
Dept: HEMATOLOGY ONCOLOGY | Facility: CLINIC | Age: 74
End: 2024-07-15

## 2024-07-15 NOTE — TELEPHONE ENCOUNTER
Worked on making room for patient to be seen at  office on 7/18. Called patient this morning we made a spot for her at 2:30pm 7/18 at , patient appreciated being moved up and is able to make appt.

## 2024-07-15 NOTE — PROGRESS NOTES
"Incoming call rcsowmya from the pt this morning, she has called to tell me that she is very worried and she states that she is having bleeding \"almost like she has her period\". She is placing about 2 pads one on top of the other and they are soaking through with blood in about an hour's time. She reports taking Tylenol and it lasting about 9-10 hrs until she starts to feel severe pain, she states it takes about an hour before the pain subsides after her taking the next dose around. I instructed her not to wait so long to take her next dose and if she feels 9-10 hrs is sufficient to take the Tylenol around 7-8 hrs before it wears off. She understands. I advised her that if she is bleeding that excessively she will need to go to the ER, she does not feel they will help her the way she needs and is holding off on going until she gets her gyn onc appt moved up. She is concerned about how far out she is scheduled. I told her this is being looked into for her to be seen ASAP and I will call her today at some point w an update. I strongly advised her to go to the ER if she becomes symptomatic of blood loss- dizziness, SOB, lightheaded, fatigued. She understands and thanked me.  "
no concerns

## 2024-07-18 ENCOUNTER — CONSULT (OUTPATIENT)
Age: 74
End: 2024-07-18
Payer: MEDICARE

## 2024-07-18 ENCOUNTER — DOCUMENTATION (OUTPATIENT)
Dept: OTHER | Facility: HOSPITAL | Age: 74
End: 2024-07-18

## 2024-07-18 ENCOUNTER — NURSE TRIAGE (OUTPATIENT)
Dept: OTHER | Facility: OTHER | Age: 74
End: 2024-07-18

## 2024-07-18 VITALS
DIASTOLIC BLOOD PRESSURE: 76 MMHG | RESPIRATION RATE: 16 BRPM | HEIGHT: 64 IN | WEIGHT: 118.6 LBS | OXYGEN SATURATION: 99 % | TEMPERATURE: 97.7 F | HEART RATE: 143 BPM | SYSTOLIC BLOOD PRESSURE: 144 MMHG | BODY MASS INDEX: 20.25 KG/M2

## 2024-07-18 DIAGNOSIS — C54.1 ENDOMETRIAL CANCER DETERMINED BY UTERINE BIOPSY (HCC): ICD-10-CM

## 2024-07-18 DIAGNOSIS — C54.1 ENDOMETRIAL CANCER (HCC): Primary | ICD-10-CM

## 2024-07-18 DIAGNOSIS — E05.90 HYPERTHYROIDISM: ICD-10-CM

## 2024-07-18 DIAGNOSIS — R63.4 WEIGHT LOSS: ICD-10-CM

## 2024-07-18 DIAGNOSIS — R00.0 TACHYCARDIA: ICD-10-CM

## 2024-07-18 PROCEDURE — 99205 OFFICE O/P NEW HI 60 MIN: CPT | Performed by: OBSTETRICS & GYNECOLOGY

## 2024-07-18 RX ORDER — GABAPENTIN 100 MG/1
100 CAPSULE ORAL ONCE
OUTPATIENT
Start: 2024-07-18 | End: 2024-07-18

## 2024-07-18 RX ORDER — SODIUM CHLORIDE, SODIUM LACTATE, POTASSIUM CHLORIDE, CALCIUM CHLORIDE 600; 310; 30; 20 MG/100ML; MG/100ML; MG/100ML; MG/100ML
125 INJECTION, SOLUTION INTRAVENOUS CONTINUOUS
OUTPATIENT
Start: 2024-07-18

## 2024-07-18 RX ORDER — HEPARIN SODIUM 5000 [USP'U]/ML
5000 INJECTION, SOLUTION INTRAVENOUS; SUBCUTANEOUS
OUTPATIENT
Start: 2024-07-18 | End: 2024-07-19

## 2024-07-18 RX ORDER — ACETAMINOPHEN 325 MG/1
975 TABLET ORAL ONCE
OUTPATIENT
Start: 2024-07-18 | End: 2024-07-18

## 2024-07-18 NOTE — PROGRESS NOTES
Assessment/Plan:    Problem List Items Addressed This Visit       Hyperthyroidism    Relevant Orders    TSH, 3rd generation with Free T4 reflex    Endometrial cancer (HCC) - Primary     Today we reviewed the results of her pathology which is consistent with an endometrial cancer, possibly high-grade.  We reviewed that with a high-grade cancer she will need a CT of the chest abdomen and pelvis for staging.  We reviewed that the mainstays of treatment for a cancer like this are surgery, chemotherapy, and sometimes radiation.  We discussed surgery including a robotic total laparoscopic hysterectomy and bilateral sleeping oophorectomy, sentinel lymph node dissection, possible mini lap or exploratory laparotomy.  We discussed surgical risks including bleeding, infection, damage to surrounding structures, need for additional surgery, and blood clot.  Consent was signed today and orders were placed.  She understands that she will need a CT before surgery, which may change the plan and necessitate chemotherapy first.  All questions answered.         Relevant Orders    Case request operating room: HYSTERECTOMY LAPAROSCOPIC TOTAL (LTH) W/ ROBOTICS (Completed)    Type and screen    CBC and Platelet    Protime-INR    HEMOGLOBIN A1C W/ EAG ESTIMATION    Comprehensive metabolic panel        EKG 12 lead    CT chest abdomen pelvis w contrast    Weight loss     Possibly secondary to malignancy versus social situation/mood changes after the death of her .  CT of the chest abdomen pelvis ordered         Tachycardia     Improved from initially 140s to 100s.  TSH ordered          Other Visit Diagnoses       Endometrial cancer determined by uterine biopsy (HCC)              CHIEF COMPLAINT: new diagnosis of endometrial cancer    Oncology Problem:   Cancer Staging   No matching staging information was found for the patient.      Previous therapy:  Oncology History    No history exists.       Most recent imaging:  US pelvis  "complete w transvaginal  Narrative: PELVIC ULTRASOUND, COMPLETE    INDICATION: The patient is 74 years old. post menopausal vaginal bleeding.    COMPARISON: None    TECHNIQUE: Transabdominal pelvic ultrasound was performed in sagittal and transverse planes with a curvilinear transducer. Additional transvaginal imaging was performed to better evaluate the endometrium and ovaries. Imaging included volumetric sweeps as   well as traditional still imaging technique.    FINDINGS:    UTERUS:  The uterus is anteverted in position, measuring 13.6 x 8.1 x 7.8 cm.  The myometrium is diffusely heterogeneous with bulbous, lobulated contour.  The cervix appears within normal limits.    ENDOMETRIUM:  Not visualized due to myometrial heterogeneity.    OVARIES/ADNEXA:  Not visualized likely due to a combination of atrophy and bowel gas.    OTHER:  No free fluid or loculated fluid collections.  Impression: 1.  Diffusely enlarged and heterogeneous uterus with lobulated contour. This may represent a combination of adenomyosis and fibroids though difficult to delineate discrete lesions. Endometrium is not discretely visualized and endometrial pathology   including malignancy is not excluded. This would be better evaluated by nonemergent MRI and follow-up with OB/GYN for endometrial biopsy is recommended.    2.  Ovaries not visualized.    The study was marked in EPIC for immediate notification.    Workstation performed: [id number]    Patient ID: Nallely Peacock is a 74 y.o. female with PMB who presents for consultation for a new of endometrial cancer. She went through menopause at 46yo, then developed PMB 7-8 weeks ago. EMB showed \"extensively necrotic tissue with rare cluster of viable, atypical epithelial cells suspicious for malignancy with features suggesting a high grade carcinoma. TVUS showed an abnormal, enlarged, and heterogenous uterus. Today she reports that she has lost 50lbs over the last year, which she at least partially " "attributes to the death of her  in 04/2024. Some decreased appetite,     No change appetite or bowel/bladder habits. No abdominal pain. No nausea or vomiting.     Current Outpatient Medications   Medication Sig Dispense Refill    acetaminophen (TYLENOL) 160 MG chewable tablet Chew 160 mg every 6 (six) hours as needed for mild pain      amLODIPine (NORVASC) 10 mg tablet Take 1 tablet (10 mg total) by mouth daily 90 tablet 3    calcium carbonate (Calcium 600) 600 MG tablet Take 600 mg by mouth 2 (two) times a day with meals      Multiple Vitamin (MULTIVITAMIN ADULT PO) Take 1 tablet by mouth daily      simvastatin (ZOCOR) 10 mg tablet Take 1 tablet (10 mg total) by mouth daily at bedtime 90 tablet 3     No current facility-administered medications for this visit.       Allergies   Allergen Reactions    Citrus - Food Allergy Hives    Codeine Hives    Penicillin G Rash       Past Medical History:   Diagnosis Date    High blood pressure        Past Surgical History:   Procedure Laterality Date    LEG SURGERY Right     inserted ebenezer 12/20/2021       OB History    No obstetric history on file.         No family history on file.    Objective:    Blood pressure 144/76, pulse (!) 143, temperature 97.7 °F (36.5 °C), temperature source Temporal, resp. rate 16, height 5' 4\" (1.626 m), weight 53.8 kg (118 lb 9.6 oz), SpO2 99%.  Body mass index is 20.36 kg/m².    Physical Exam  Vitals and nursing note reviewed.   Constitutional:       Comments: Temporal wasting present, tearful during the exam   HENT:      Head: Normocephalic and atraumatic.   Cardiovascular:      Rate and Rhythm: Regular rhythm.      Comments: Tachycardic to 100s  Pulmonary:      Effort: Pulmonary effort is normal. No respiratory distress.   Abdominal:      General: There is no distension.      Palpations: Abdomen is soft. There is no mass.      Tenderness: There is no abdominal tenderness. There is no guarding or rebound.      Comments: Right inguinal " "hernia   Genitourinary:     Comments: Atrophic vagina, small amount of dark brown blood in the vault, normal cervix. Enlarged uterus, ~15 weeks, mobile, no parametrial disease.   Skin:     General: Skin is warm and dry.   Neurological:      Mental Status: She is alert.       No results found for: \"\"  Lab Results   Component Value Date    WBC 7.84 06/22/2024    HGB 11.5 06/22/2024    HCT 35.2 06/22/2024    MCV 89 06/22/2024     06/22/2024     Lab Results   Component Value Date    K 3.4 (L) 06/22/2024     06/22/2024    CO2 24 06/22/2024    BUN 16 06/22/2024    CREATININE 0.79 06/22/2024    GLUF 98 05/31/2024    CALCIUM 9.5 06/22/2024    AST 15 06/22/2024    ALT 6 (L) 06/22/2024    ALKPHOS 69 06/22/2024    EGFR 72 06/22/2024        Trend:  No results found for: \"\"    EMB 7/2/24  A.  Endometrium:     - Entirely necrotic tissue precluding further evaluation.  B.  Endometrium:     - Extensively necrotic tissue with rare cluster of viable, atypical epithelial cells suspicious for malignancy with        features suggesting a high grade carcinoma.    Patient seen and discussed with Dr. Jacome.    Dia Aburto  Gynecologic Oncology Fellow      "

## 2024-07-18 NOTE — ASSESSMENT & PLAN NOTE
Possibly secondary to malignancy versus social situation/mood changes after the death of her .  CT of the chest abdomen pelvis ordered

## 2024-07-18 NOTE — ASSESSMENT & PLAN NOTE
Today we reviewed the results of her pathology which is consistent with an endometrial cancer, possibly high-grade.  We reviewed that with a high-grade cancer she will need a CT of the chest abdomen and pelvis for staging.  We reviewed that the mainstays of treatment for a cancer like this are surgery, chemotherapy, and sometimes radiation.  We discussed surgery including a robotic total laparoscopic hysterectomy and bilateral sleeping oophorectomy, sentinel lymph node dissection, possible mini lap or exploratory laparotomy.  We discussed surgical risks including bleeding, infection, damage to surrounding structures, need for additional surgery, and blood clot.  Consent was signed today and orders were placed.  She understands that she will need a CT before surgery, which may change the plan and necessitate chemotherapy first.  All questions answered.

## 2024-07-18 NOTE — PROGRESS NOTES
Nallely Peacock (1950) was identified as a potential candidate for the [employer] 2021-05 clinical research study. Pt was seen by Dr. Jacome at [facility] on 7/18/2024 for a consult regarding her newly diagnosed endometrial cancer. The risks, benefits, procedures, and alternatives of study participation were discussed with the patient and she agreed to proceed. The details of the Informed Consent were reviewed with the patient in its entirety and she was given opportunity to ask questions. The patient and CRC signed ICF on 7/18/2024 at 3:45pm. Patient declined stipend. Copy of signed ICF was made and given to patient. Blood draw for study was done by Ashley MULTANI on 7/18/2024 at 3:53pm. CRC contact info was given to patient and instructed to call with any questions or concerns.    Documentation of Informed Consent Process for Clinical Research Study    Study Title: [employer] 2021-05  Patient Name: Nallely Peacock  YOB: 1950    This signed and dated document shall serve as certification that all of the below listed required elements of informed consent were provided to the subject or legally authorized representative signing the actual Informed Consent Document, both in written and verbal format.     The HIPAA consent is contained within the Informed Consent document, and has also been discussed.    A copy of the actual signed and dated Informed Consent has also been provided to the subject or legally authorized representative, and the original signed document shall be maintained in the research shadow chart.    Element of Informed Consent Discussed Date Initials/ Person obtaining consent   A statement that the study involves research, an explanation of the purposes of the research and the expected duration of the subject's participation, a description of the procedures to be followed, and identification of any procedures which are experimental 7/18/2024 JS   A description of any  reasonably foreseeable risks or discomforts to the subject. 7/18/2024 JS   A description of any benefits to the subject or to others which may reasonably be expected from the research. 7/18/2024 JS   A disclosure of appropriate alternative procedures or courses of treatment, if any, that might be advantageous to the subject. 7/18/2024 JS   A statement describing the extent, if any, to which confidentiality of records identifying the subject will be maintained and that notes the possibility that the Food and Drug Administration may inspect the records 7/18/2024 JS   For research involving more than minimal risk, an explanation as to whether any compensation and an explanation as to whether any medical treatments are available if injury occurs and, if so, what they consist of, or where further information may be obtained. 7/18/2024 JS   An explanation of whom to contact for answers to pertinent questions about the research and research subjects' rights, and whom to contact in the event of a research-related injury to the subject. 7/18/2024 JS   A statement that participation is voluntary, that refusal to participate will involve no penalty or loss of benefits to which the subject is otherwise entitled, and that the subject may discontinue participation at any time without penalty or loss of benefits to which the subject is otherwise entitled. 7/18/2024 JS   A statement that the particular treatment or procedure may involve risks to the subject (or to the embryo or fetus, if the subject is or may become pregnant) which are currently unforeseeable 7/18/2024 JS   Anticipated circumstances under which the subject's participation may be terminated by the investigator without regard to the subject's consent. 7/18/2024 JS   Any additional costs to the subject that may result from participation in the research 7/18/2024 JS   The consequences of a subjects' decision to withdraw from the research and procedures for orderly  termination of participation by the subject. 7/18/2024 RANCHO   A statement that significant new findings developed during the course of the research which may relate to the subject's willingness to continue participation will be provided to the subject. 7/18/2024 RANCHO   The approximate number of subjects involved in the study. 7/18/2024 RANCHO

## 2024-07-19 ENCOUNTER — TELEPHONE (OUTPATIENT)
Dept: GYNECOLOGIC ONCOLOGY | Facility: CLINIC | Age: 74
End: 2024-07-19

## 2024-07-19 NOTE — TELEPHONE ENCOUNTER
Called and spoke to patient to clarify which drinks she was given for pre surgery. Patient stated they where Ensure Pre-surgical Carbohydrate Strawberry flavored. Patient stated she can not have strawberries due to being allergic. After speaking to [facility] surgery scheduler earlier she stated patient can have 3 10 oz Gatorades. Patient stated she found clear cherry flavored surgery free Gatorade when she went to the store.  Patient stated she understood and thanked me for my return call.

## 2024-07-19 NOTE — TELEPHONE ENCOUNTER
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 I spoke to KM to see what she should drink instead and she instructed me to tell the patient to get the clear Gatorade from the store and get 3 bottles of that to drink instead of the ensure. Patient stated she understood .

## 2024-07-19 NOTE — TELEPHONE ENCOUNTER
Called to ask patient what her reaction is to Strawberries so I can add it to her allergy list. I instructed patient to call back and ask for me directly.

## 2024-07-19 NOTE — TELEPHONE ENCOUNTER
Patient calling with update regarding pre-surgical drinks. Was able to find clear Gatorade that is cherry flavored. Patient does not have an allergy to cherries. Told patient that will work in place of ensure. No other questions or concerns.

## 2024-07-19 NOTE — TELEPHONE ENCOUNTER
"Reason for Disposition   [1] Caller has NON-URGENT medicine question about med that PCP prescribed AND [2] triager unable to answer question    Answer Assessment - Initial Assessment Questions  1. NAME of MEDICATION: \"What medicine are you calling about?\"      [place] ensure    2. QUESTION: \"What is your question?\" (e.g., medication refill, side effect)      Allergic to strawberries, unable to drink    3. PRESCRIBING HCP: \"Who prescribed it?\" Reason: if prescribed by specialist, call should be referred to that group.      [facility]      Pt received strawberry ensure today for her to drink prior to surgery in early August. Patient is allergic to strawberries. She is not sure what to do or how to get an appropriate replacement.    Protocols used: Medication Question Call-ADULT-    "

## 2024-07-19 NOTE — TELEPHONE ENCOUNTER
Can we confirm we are talking about the same pre-op drink? I thought this was the Ensure carb drink and not for bowel prep, in which case I don't know that Gatorade can be substituted?

## 2024-07-26 ENCOUNTER — HOSPITAL ENCOUNTER (OUTPATIENT)
Dept: CT IMAGING | Facility: HOSPITAL | Age: 74
Discharge: HOME | End: 2024-07-26
Attending: OBSTETRICS & GYNECOLOGY
Payer: MEDICARE

## 2024-07-26 DIAGNOSIS — C54.1 ENDOMETRIAL CANCER (HCC): ICD-10-CM

## 2024-07-26 PROCEDURE — 74177 CT ABD & PELVIS W/CONTRAST: CPT

## 2024-07-26 PROCEDURE — 71260 CT THORAX DX C+: CPT

## 2024-07-26 RX ADMIN — IOHEXOL 80 ML: 350 INJECTION, SOLUTION INTRAVENOUS at 13:50

## 2024-07-26 RX ADMIN — IOHEXOL 50 ML: 240 INJECTION, SOLUTION INTRATHECAL; INTRAVASCULAR; INTRAVENOUS; ORAL at 13:50

## 2024-07-29 ENCOUNTER — TELEPHONE (OUTPATIENT)
Age: 74
End: 2024-07-29

## 2024-07-29 NOTE — TELEPHONE ENCOUNTER
Pt called in looking for her CT scan results to be discussed. She stated she was to be called today and wanted a status update. Pt would like a call back at [phone]. Also if there is no answer she would like a detailed VM. Thank you.

## 2024-07-30 DIAGNOSIS — C54.1 ENDOMETRIAL CANCER (HCC): Primary | ICD-10-CM

## 2024-07-30 NOTE — ASSESSMENT & PLAN NOTE
Reviewed CT results consistent with high grade endometrial cancer stage IIIC2 disease (retroperitoneal and aortocaval lymphadenopathy) vs IVB (possible bowel mesentery disease).  Today we reviewed the results of her CT scan and how that would correlate with stage we discussed that for treatment we would recommend starting with chemotherapy and possibly readdressing surgery after that.  We reviewed the regimen of 3 medications carboplatin of 6 AUC, Taxol 175 mg/m², and dostarlimab 500 mg every 3 weeks with a plan to repeat CT to evaluate treatment response after 3 cycles. Side effects and risks of carbo/taxol including but are not limited to decreases in blood counts, infusion reaction, hair loss, peripheral neuropathy, N/V, diarrhea or constipation, fatigue. I also discussed with patient rationale, logistics, common side effects and toxicities associated with immunotherapy regimen.  She understands toxicities include but are not limited to enteritis, gastritis, dermatitis, thyroiditis, pneumonitis, etc. She understands these may be life-threatening.  Lastly, we reviewed the risks and benefits of having a port placed for treatment including risk of possible infection.  She would like to proceed with port.  Orders placed.

## 2024-07-30 NOTE — PROGRESS NOTES
Assessment/Plan:    Problem List Items Addressed This Visit       Endometrial cancer (HCC) - Primary     Reviewed CT results consistent with high grade endometrial cancer stage IIIC2 disease (retroperitoneal and aortocaval lymphadenopathy) vs IVB (possible bowel mesentery disease).  Today we reviewed the results of her CT scan and how that would correlate with stage we discussed that for treatment we would recommend starting with chemotherapy and possibly readdressing surgery after that.  We reviewed the regimen of 3 medications carboplatin of 6 AUC, Taxol 175 mg/m², and dostarlimab 500 mg every 3 weeks with a plan to repeat CT to evaluate treatment response after 3 cycles. Side effects and risks of carbo/taxol including but are not limited to decreases in blood counts, infusion reaction, hair loss, peripheral neuropathy, N/V, diarrhea or constipation, fatigue. I also discussed with patient rationale, logistics, common side effects and toxicities associated with immunotherapy regimen.  She understands toxicities include but are not limited to enteritis, gastritis, dermatitis, thyroiditis, pneumonitis, etc. She understands these may be life-threatening.  Lastly, we reviewed the risks and benefits of having a port placed for treatment including risk of possible infection.  She would like to proceed with port.  Orders placed.               Relevant Orders    Ambulatory referral to Interventional Radiology     CHIEF COMPLAINT: Review CT results, discuss chemotherapy    Oncology Problem:   Cancer Staging   No matching staging information was found for the patient.      Previous therapy:  Oncology History   Endometrial cancer (HCC)   7/18/2024 Initial Diagnosis    Endometrial cancer (HCC)     8/13/2024 -  Chemotherapy    alteplase (CATHFLO), 2 mg, Intracatheter, Every 1 Minute as needed, 0 of 12 cycles  palonosetron (ALOXI), 0.25 mg, Intravenous, Once, 0 of 6 cycles  fosaprepitant (EMEND) IVPB, 150 mg, Intravenous, Once,  0 of 6 cycles  dostarlimab-gxly (JEMPERLI) IVPB, 500 mg, Intravenous, Once, 0 of 12 cycles  CARBOplatin (PARAPLATIN) IVPB (GOG AUC DOSING), , Intravenous, Once, 0 of 6 cycles  PACLItaxel (TAXOL) chemo IVPB, 135 mg/m2 = 211.8 mg (77.1 % of original dose 175 mg/m2), Intravenous, Once, 0 of 6 cycles  Dose modification: 135 mg/m2 (original dose 175 mg/m2, Cycle 1, Reason: Other (Must fill in a comment), Comment: prescribed starting dose)           Patient ID: Nallely Peacock is a 74 y.o. female who present presents to discuss her CT results and treatment planning.  She reports that since she was last seen she has had some pain; however, she is taking Tylenol once or twice a day with good control of her pain.  She has an appetite and is trying to eat.  No nausea or vomiting.  No changes in bowel or bladder.    Review of Systems   Constitutional:  Negative for chills and fever.   HENT:  Negative for ear pain and sore throat.    Respiratory:  Negative for cough and shortness of breath.    Cardiovascular:  Negative for chest pain and palpitations.   Gastrointestinal:  Negative for abdominal pain and vomiting.   Genitourinary:  Negative for dysuria and hematuria.   Musculoskeletal:  Negative for arthralgias and back pain.   Skin:  Negative for color change and rash.   All other systems reviewed and are negative.      Current Outpatient Medications   Medication Sig Dispense Refill    acetaminophen (TYLENOL) 160 MG chewable tablet Chew 160 mg every 6 (six) hours as needed for mild pain      amLODIPine (NORVASC) 10 mg tablet Take 1 tablet (10 mg total) by mouth daily 90 tablet 3    calcium carbonate (Calcium 600) 600 MG tablet Take 600 mg by mouth 2 (two) times a day with meals      Multiple Vitamin (MULTIVITAMIN ADULT PO) Take 1 tablet by mouth daily      simvastatin (ZOCOR) 10 mg tablet Take 1 tablet (10 mg total) by mouth daily at bedtime 90 tablet 3     No current facility-administered medications for this visit.  "      Allergies   Allergen Reactions    Citrus - Food Allergy Hives    Codeine Hives    Strawberry (Diagnostic) - Food Allergy Hives    Penicillin G Rash       Past Medical History:   Diagnosis Date    High blood pressure     Hyperlipidemia        Past Surgical History:   Procedure Laterality Date    COLONOSCOPY      LEG SURGERY Right     inserted ebenezer 12/20/2021       OB History    No obstetric history on file.         No family history on file.    Objective:    Blood pressure 138/80, pulse (!) 122, temperature (!) 97.4 °F (36.3 °C), temperature source Temporal, resp. rate 16, height 5' 4\" (1.626 m), weight 54 kg (119 lb), SpO2 98%.  Body mass index is 20.43 kg/m².    Physical Exam  Vitals and nursing note reviewed.   Constitutional:       Appearance: Normal appearance.   HENT:      Head: Normocephalic and atraumatic.   Cardiovascular:      Rate and Rhythm: Normal rate and regular rhythm.   Pulmonary:      Effort: Pulmonary effort is normal. No respiratory distress.   Abdominal:      General: There is no distension.      Palpations: Abdomen is soft. There is no mass.      Tenderness: There is no abdominal tenderness. There is no guarding or rebound.   Skin:     General: Skin is warm and dry.   Neurological:      Mental Status: She is alert.           No results found for: \"\"  Lab Results   Component Value Date    WBC 7.84 06/22/2024    HGB 11.5 06/22/2024    HCT 35.2 06/22/2024    MCV 89 06/22/2024     06/22/2024     Lab Results   Component Value Date    K 3.4 (L) 06/22/2024     06/22/2024    CO2 24 06/22/2024    BUN 16 06/22/2024    CREATININE 0.79 06/22/2024    GLUF 98 05/31/2024    CALCIUM 9.5 06/22/2024    AST 15 06/22/2024    ALT 6 (L) 06/22/2024    ALKPHOS 69 06/22/2024    EGFR 72 06/22/2024        Trend:  No results found for: \"\"    CT Chest/Abdomen/Pelvis 7/26/24  CT CHEST, ABDOMEN AND PELVIS WITH IV CONTRAST     INDICATION: C54.1: Malignant neoplasm of endometrium. Recently " diagnosed high-grade endometrial cancer. Initial work-up     COMPARISON: No prior CT studies for comparison. Pelvic ultrasound 6/22/2024     TECHNIQUE: CT examination of the chest, abdomen and pelvis was performed. Multiplanar 2D reformatted images were created from the source data.     This examination, like all CT scans performed in the [facility], was performed utilizing techniques to minimize radiation dose exposure, including the use of iterative reconstruction and automated exposure control. Radiation dose length   product (DLP) for this visit: 544 mGy-cm     IV Contrast: 50 mL of iohexol (OMNIPAQUE) 80 mL of iohexol (OMNIPAQUE)  Enteric Contrast: Administered.     FINDINGS:     CHEST     LUNGS:  - 0.4 cm left posterior sulcus somewhat triangular-shaped nodule image 604/230  - Left lower lobe subpleural nodule measuring 0.3 cm image 604/184  There is a right lower lobe 0.2 cm nodule, image 604/174  - Anterior left upper lobe subpleural 0.2 cm nodule image 604/152     PLEURA: Unremarkable.     HEART/GREAT VESSELS: Coronary artery calcification, no pericardial effusion. Calcification in region of aortic valve. No thoracic aortic aneurysm.     MEDIASTINUM AND KIERAN: Unremarkable.     CHEST WALL AND LOWER NECK: Some asymmetry with respect to breast density, with the right retroareolar region containing more dense tissue than the left. See image 302/74. No abnormal enhancement. No axillary adenopathy. Thyromegaly. Subcentimeter left   thyroid nodule, which would not require further evaluation with ultrasound     ABDOMEN     LIVER/BILIARY TREE: Subcentimeter hypodense nodules too small to characterize. Hepatic cyst adjacent to the intrahepatic IVC on image 302/107, measuring 1.4 x 1.2 cm.     GALLBLADDER: Cholelithiasis without findings of acute cholecystitis.     SPLEEN: Unremarkable.     PANCREAS: Unremarkable.     ADRENAL GLANDS: Unremarkable.     KIDNEYS/URETERS: Unremarkable. No  hydronephrosis.     STOMACH AND BOWEL: Colonic diverticulosis. No bowel obstruction. No inflammatory change. Duodenal diverticulum.     APPENDIX: No findings to suggest appendicitis.     ABDOMINOPELVIC CAVITY: There is multifocal adenopathy identified in the abdomen and pelvis with examples as follows:  - Interaortocaval lymph node image 301/140, measures 1.8 x 1.5 cm  - Left retroperitoneal adenopathy measuring 2.7 x 1.7 cm image 301/142  -poorly delineated mass at the root of the small bowel mesentery either representing adenopathy adjacent to the uterine mass or lobular extension of the lesion superiorly, on image 301/174 measuring 2.9 x 2.1 cm.  - Right lateral pelvic sidewall 3.2 x 2.0 cm steven mass image 301/193  - Left pelvic sidewall 2.0 x 1.3 cm steven metastasis image 301/204.     VESSELS: Unremarkable for patient's age.     PELVIS     REPRODUCTIVE ORGANS:  Large heterogeneous irregularly enhancing lobular uterine mass, measuring 12.2 x 8.8 x 12.7 cm. Foci of internal air likely related to recent biopsy. Inferiorly, the mass extends toward the cervicouterine junction.     Right adnexal cyst measures 2.8 x 1.7 cm image 301/184, and on the left on image 301/183 measuring 1.5 cm in diameter     URINARY BLADDER: The uterine lesion creates mass effect upon the urinary bladder     ABDOMINAL WALL/INGUINAL REGIONS: Unremarkable.     BONES: Right hip hardware. No acute finding     IMPRESSION:     1. Markedly enlarged, heterogeneous, irregularly enhancing uterine mass with above-described characteristics in keeping with biopsy-proven endometrial cancer  2. The mass either extends superiorly to involve the small bowel mesentery or there is adenopathy adjacent to the fundus  3. Multifocal intra-abdominal and intrapelvic adenopathy as above described  4. Bilateral adnexal cysts, right larger than left  5. Small lung nodules measuring under 0.5 cm. Consider noncontrast CT chest reassessment in approximately 3 months  time  6. Somewhat asymmetric breast tissue, right greater than left. Correlate for any suspicious palpable abnormalities. There is no mammography on record at this institution or listed in Care Everywhere    Patient seen and discussed with Dr. Jacome.    Dia Aburto  Gynecologic Oncology Fellow

## 2024-08-01 ENCOUNTER — DOCUMENTATION (OUTPATIENT)
Age: 74
End: 2024-08-01

## 2024-08-01 ENCOUNTER — OFFICE VISIT (OUTPATIENT)
Age: 74
End: 2024-08-01
Payer: MEDICARE

## 2024-08-01 VITALS
OXYGEN SATURATION: 98 % | BODY MASS INDEX: 20.32 KG/M2 | DIASTOLIC BLOOD PRESSURE: 80 MMHG | SYSTOLIC BLOOD PRESSURE: 138 MMHG | RESPIRATION RATE: 16 BRPM | WEIGHT: 119 LBS | HEART RATE: 122 BPM | TEMPERATURE: 97.4 F | HEIGHT: 64 IN

## 2024-08-01 DIAGNOSIS — C54.1 ENDOMETRIAL CANCER (HCC): Primary | ICD-10-CM

## 2024-08-01 DIAGNOSIS — R53.81 OTHER MALAISE: ICD-10-CM

## 2024-08-01 PROCEDURE — 99215 OFFICE O/P EST HI 40 MIN: CPT | Performed by: OBSTETRICS & GYNECOLOGY

## 2024-08-01 PROCEDURE — G2211 COMPLEX E/M VISIT ADD ON: HCPCS | Performed by: OBSTETRICS & GYNECOLOGY

## 2024-08-01 RX ORDER — LORAZEPAM 1 MG/1
1 TABLET ORAL EVERY 8 HOURS PRN
Qty: 20 TABLET | Refills: 0 | Status: SHIPPED | OUTPATIENT
Start: 2024-08-01

## 2024-08-01 RX ORDER — ONDANSETRON HYDROCHLORIDE 8 MG/1
8 TABLET, FILM COATED ORAL EVERY 8 HOURS PRN
Qty: 30 TABLET | Refills: 1 | Status: SHIPPED | OUTPATIENT
Start: 2024-08-01

## 2024-08-01 NOTE — LETTER
August 1, 2024     Nithin Carlson MD  [facility] [address] [address]    Patient: Nallely Peacock   YOB: 1950   Date of Visit: 8/1/2024       Dear Dr. Carlson:    Thank you for referring Nallely Peacock to me for evaluation. Below are my notes for this consultation.    If you have questions, please do not hesitate to call me. I look forward to following your patient along with you.         Sincerely,        Lavon Jacome MD        CC: DAY Fajardo MD  8/1/2024 10:33 AM  Attested  Assessment/Plan:    Problem List Items Addressed This Visit       Endometrial cancer (HCC) - Primary     Reviewed CT results consistent with high grade endometrial cancer stage IIIC2 disease (retroperitoneal and aortocaval lymphadenopathy) vs IVB (possible bowel mesentery disease).  Today we reviewed the results of her CT scan and how that would correlate with stage we discussed that for treatment we would recommend starting with chemotherapy and possibly readdressing surgery after that.  We reviewed the regimen of 3 medications carboplatin of 6 AUC, Taxol 175 mg/m², and dostarlimab 500 mg every 3 weeks with a plan to repeat CT to evaluate treatment response after 3 cycles. Side effects and risks of carbo/taxol including but are not limited to decreases in blood counts, infusion reaction, hair loss, peripheral neuropathy, N/V, diarrhea or constipation, fatigue. I also discussed with patient rationale, logistics, common side effects and toxicities associated with immunotherapy regimen.  She understands toxicities include but are not limited to enteritis, gastritis, dermatitis, thyroiditis, pneumonitis, etc. She understands these may be life-threatening.  Lastly, we reviewed the risks and benefits of having a port placed for treatment including risk of possible infection.  She would like to proceed with port.  Orders placed.               Relevant Orders     Ambulatory referral to Interventional Radiology     CHIEF COMPLAINT: Review CT results, discuss chemotherapy    Oncology Problem:   Cancer Staging   No matching staging information was found for the patient.      Previous therapy:  Oncology History   Endometrial cancer (HCC)   7/18/2024 Initial Diagnosis    Endometrial cancer (HCC)     8/13/2024 -  Chemotherapy    alteplase (CATHFLO), 2 mg, Intracatheter, Every 1 Minute as needed, 0 of 12 cycles  palonosetron (ALOXI), 0.25 mg, Intravenous, Once, 0 of 6 cycles  fosaprepitant (EMEND) IVPB, 150 mg, Intravenous, Once, 0 of 6 cycles  dostarlimab-gxly (JEMPERLI) IVPB, 500 mg, Intravenous, Once, 0 of 12 cycles  CARBOplatin (PARAPLATIN) IVPB (GO AUC DOSING), , Intravenous, Once, 0 of 6 cycles  PACLItaxel (TAXOL) chemo IVPB, 135 mg/m2 = 211.8 mg (77.1 % of original dose 175 mg/m2), Intravenous, Once, 0 of 6 cycles  Dose modification: 135 mg/m2 (original dose 175 mg/m2, Cycle 1, Reason: Other (Must fill in a comment), Comment: prescribed starting dose)           Patient ID: Nallely Peacock is a 74 y.o. female who present presents to discuss her CT results and treatment planning.  She reports that since she was last seen she has had some pain; however, she is taking Tylenol once or twice a day with good control of her pain.  She has an appetite and is trying to eat.  No nausea or vomiting.  No changes in bowel or bladder.    Review of Systems   Constitutional:  Negative for chills and fever.   HENT:  Negative for ear pain and sore throat.    Respiratory:  Negative for cough and shortness of breath.    Cardiovascular:  Negative for chest pain and palpitations.   Gastrointestinal:  Negative for abdominal pain and vomiting.   Genitourinary:  Negative for dysuria and hematuria.   Musculoskeletal:  Negative for arthralgias and back pain.   Skin:  Negative for color change and rash.   All other systems reviewed and are negative.      Current Outpatient Medications   Medication Sig  "Dispense Refill   • acetaminophen (TYLENOL) 160 MG chewable tablet Chew 160 mg every 6 (six) hours as needed for mild pain     • amLODIPine (NORVASC) 10 mg tablet Take 1 tablet (10 mg total) by mouth daily 90 tablet 3   • calcium carbonate (Calcium 600) 600 MG tablet Take 600 mg by mouth 2 (two) times a day with meals     • Multiple Vitamin (MULTIVITAMIN ADULT PO) Take 1 tablet by mouth daily     • simvastatin (ZOCOR) 10 mg tablet Take 1 tablet (10 mg total) by mouth daily at bedtime 90 tablet 3     No current facility-administered medications for this visit.       Allergies   Allergen Reactions   • Citrus - Food Allergy Hives   • Codeine Hives   • Strawberry (Diagnostic) - Food Allergy Hives   • Penicillin G Rash       Past Medical History:   Diagnosis Date   • High blood pressure    • Hyperlipidemia        Past Surgical History:   Procedure Laterality Date   • COLONOSCOPY     • LEG SURGERY Right     inserted ebenezer 12/20/2021       OB History    No obstetric history on file.         No family history on file.    Objective:    Blood pressure 138/80, pulse (!) 122, temperature (!) 97.4 °F (36.3 °C), temperature source Temporal, resp. rate 16, height 5' 4\" (1.626 m), weight 54 kg (119 lb), SpO2 98%.  Body mass index is 20.43 kg/m².    Physical Exam  Vitals and nursing note reviewed.   Constitutional:       Appearance: Normal appearance.   HENT:      Head: Normocephalic and atraumatic.   Cardiovascular:      Rate and Rhythm: Normal rate and regular rhythm.   Pulmonary:      Effort: Pulmonary effort is normal. No respiratory distress.   Abdominal:      General: There is no distension.      Palpations: Abdomen is soft. There is no mass.      Tenderness: There is no abdominal tenderness. There is no guarding or rebound.   Skin:     General: Skin is warm and dry.   Neurological:      Mental Status: She is alert.           No results found for: \"\"  Lab Results   Component Value Date    WBC 7.84 06/22/2024    HGB 11.5 " "06/22/2024    HCT 35.2 06/22/2024    MCV 89 06/22/2024     06/22/2024     Lab Results   Component Value Date    K 3.4 (L) 06/22/2024     06/22/2024    CO2 24 06/22/2024    BUN 16 06/22/2024    CREATININE 0.79 06/22/2024    GLUF 98 05/31/2024    CALCIUM 9.5 06/22/2024    AST 15 06/22/2024    ALT 6 (L) 06/22/2024    ALKPHOS 69 06/22/2024    EGFR 72 06/22/2024        Trend:  No results found for: \"\"    CT Chest/Abdomen/Pelvis 7/26/24  CT CHEST, ABDOMEN AND PELVIS WITH IV CONTRAST     INDICATION: C54.1: Malignant neoplasm of endometrium. Recently diagnosed high-grade endometrial cancer. Initial work-up     COMPARISON: No prior CT studies for comparison. Pelvic ultrasound 6/22/2024     TECHNIQUE: CT examination of the chest, abdomen and pelvis was performed. Multiplanar 2D reformatted images were created from the source data.     This examination, like all CT scans performed in the [facility], was performed utilizing techniques to minimize radiation dose exposure, including the use of iterative reconstruction and automated exposure control. Radiation dose length   product (DLP) for this visit: 544 mGy-cm     IV Contrast: 50 mL of iohexol (OMNIPAQUE) 80 mL of iohexol (OMNIPAQUE)  Enteric Contrast: Administered.     FINDINGS:     CHEST     LUNGS:  - 0.4 cm left posterior sulcus somewhat triangular-shaped nodule image 604/230  - Left lower lobe subpleural nodule measuring 0.3 cm image 604/184  There is a right lower lobe 0.2 cm nodule, image 604/174  - Anterior left upper lobe subpleural 0.2 cm nodule image 604/152     PLEURA: Unremarkable.     HEART/GREAT VESSELS: Coronary artery calcification, no pericardial effusion. Calcification in region of aortic valve. No thoracic aortic aneurysm.     MEDIASTINUM AND KIERAN: Unremarkable.     CHEST WALL AND LOWER NECK: Some asymmetry with respect to breast density, with the right retroareolar region containing more dense tissue than the left. " See image 302/74. No abnormal enhancement. No axillary adenopathy. Thyromegaly. Subcentimeter left   thyroid nodule, which would not require further evaluation with ultrasound     ABDOMEN     LIVER/BILIARY TREE: Subcentimeter hypodense nodules too small to characterize. Hepatic cyst adjacent to the intrahepatic IVC on image 302/107, measuring 1.4 x 1.2 cm.     GALLBLADDER: Cholelithiasis without findings of acute cholecystitis.     SPLEEN: Unremarkable.     PANCREAS: Unremarkable.     ADRENAL GLANDS: Unremarkable.     KIDNEYS/URETERS: Unremarkable. No hydronephrosis.     STOMACH AND BOWEL: Colonic diverticulosis. No bowel obstruction. No inflammatory change. Duodenal diverticulum.     APPENDIX: No findings to suggest appendicitis.     ABDOMINOPELVIC CAVITY: There is multifocal adenopathy identified in the abdomen and pelvis with examples as follows:  - Interaortocaval lymph node image 301/140, measures 1.8 x 1.5 cm  - Left retroperitoneal adenopathy measuring 2.7 x 1.7 cm image 301/142  -poorly delineated mass at the root of the small bowel mesentery either representing adenopathy adjacent to the uterine mass or lobular extension of the lesion superiorly, on image 301/174 measuring 2.9 x 2.1 cm.  - Right lateral pelvic sidewall 3.2 x 2.0 cm steven mass image 301/193  - Left pelvic sidewall 2.0 x 1.3 cm steven metastasis image 301/204.     VESSELS: Unremarkable for patient's age.     PELVIS     REPRODUCTIVE ORGANS:  Large heterogeneous irregularly enhancing lobular uterine mass, measuring 12.2 x 8.8 x 12.7 cm. Foci of internal air likely related to recent biopsy. Inferiorly, the mass extends toward the cervicouterine junction.     Right adnexal cyst measures 2.8 x 1.7 cm image 301/184, and on the left on image 301/183 measuring 1.5 cm in diameter     URINARY BLADDER: The uterine lesion creates mass effect upon the urinary bladder     ABDOMINAL WALL/INGUINAL REGIONS: Unremarkable.     BONES: Right hip hardware. No acute  finding     IMPRESSION:     1. Markedly enlarged, heterogeneous, irregularly enhancing uterine mass with above-described characteristics in keeping with biopsy-proven endometrial cancer  2. The mass either extends superiorly to involve the small bowel mesentery or there is adenopathy adjacent to the fundus  3. Multifocal intra-abdominal and intrapelvic adenopathy as above described  4. Bilateral adnexal cysts, right larger than left  5. Small lung nodules measuring under 0.5 cm. Consider noncontrast CT chest reassessment in approximately 3 months time  6. Somewhat asymmetric breast tissue, right greater than left. Correlate for any suspicious palpable abnormalities. There is no mammography on record at this institution or listed in Care Everywhere    Patient seen and discussed with Dr. Jacome.    Dia Aburto  Gynecologic Oncology Fellow    Attestation with edits by Lavon Jacome MD at 8/1/2024 10:48 AM:  Attending Attestation:    I reviewed the care furnished by the Resident/Fellow during the visit on 8/1/2024.  I was present in the office and personally saw and examined the patient     74-year-old with biopsy-proven high-grade endometrial cancer, extensive retroperitoneal and periaortic lymphadenopathy, pulmonary nodules, stage at least IIIC2 possible IVB..  BMI is 20 kg/m².  Her performance status is 0.  1.  We reviewed the risks of starting neoadjuvant chemotherapy with carboplatin AUC 5, Taxol at 135 mg/m² in addition to dostarlimab at 500 mg every 3 weeks for 3 cycles followed by repeat CT imaging to assess disease response.  She understands the risks of treatment and agrees to proceed as outlined.  She was provided with written information regarding potential side effects of treatment as well.  Consent for treatment was obtained in the office.  2.  If she has a response to neoadjuvant chemotherapy, hysterectomy with lymph node dissection will be performed after 3-4 cycles of treatment  followed by adjuvant chemotherapy for at least 3 additional cycles postsurgery.  I have spent a total time of 60 minutes in caring for this patient on the day of the visit/encounter including Diagnostic results, Prognosis, Risks and benefits of tx options, Instructions for management, Patient and family education, Risk factor reductions, Impressions, Counseling / Coordination of care, Documenting in the medical record, Reviewing / ordering tests, medicine, procedures  , Obtaining or reviewing history  , and Communicating with other healthcare professionals .

## 2024-08-02 ENCOUNTER — PATIENT OUTREACH (OUTPATIENT)
Dept: HEMATOLOGY ONCOLOGY | Facility: CLINIC | Age: 74
End: 2024-08-02

## 2024-08-02 ENCOUNTER — TELEPHONE (OUTPATIENT)
Age: 74
End: 2024-08-02

## 2024-08-02 NOTE — PROGRESS NOTES
I reached out and spoke with Nallely  now that consults have been completed with the oncology teams to complete the Distress Thermometer, review for any barriers to care and offer supportive services as needed. I reviewed and updated the members assigned to the care team in [facility].   She knows the members of the care team as well as how and when to contact them with any needs.   She verbalizes managing the schedules well.   She is currently able to drive and denies any transportation needs.    She denies any uncontrolled symptoms. Discussed role of Palliative Care in symptom and side effect management. Declined referral at this time.  Patients states that she is eating and drinking as per usual with no unintentional weight loss.     Patient does not smoke.   Patient states she is well supported by family and friends.  Community support groups discussed including the [facility] Support [facility] of the [facility]. Patient declined information at this time.   Patient feels she has adequate insurance coverage and denies any financial concerns at this time.     Based on individual needs I will follow up in about 3 weeks.   I have provided my direct contact information and welcome them to contact me if their needs as discussed above change. They were appreciative for the call.

## 2024-08-02 NOTE — TELEPHONE ENCOUNTER
Patient called to discuss medications that were ordered yesterday by REGINA Ponce. Patient was confused by names when going to  medications by the pharmacy, and did not  the medication. After discussion of names of medications and reasons they were prescribed, patient was agreeable to  medication.    RN personally called pharmacy to confirm that medication was still available to be picked up. Pharmacy states that they only received the order for Lorazepam, 1mg, which was ready to be picked up by patient. Pharmacy staff states they did not receive order for Zofran, as it neede prior-auth.    Called patient back to discuss that Lorazapam was available at the pharmacy for the patient to , but we are waiting on a prior-auth for the Zofran. Patient appreciative for the call.    No further questions at this time.

## 2024-08-05 ENCOUNTER — DOCUMENTATION (OUTPATIENT)
Dept: GYNECOLOGIC ONCOLOGY | Facility: CLINIC | Age: 74
End: 2024-08-05

## 2024-08-05 ENCOUNTER — TELEPHONE (OUTPATIENT)
Dept: GYNECOLOGIC ONCOLOGY | Facility: CLINIC | Age: 74
End: 2024-08-05

## 2024-08-05 ENCOUNTER — TELEPHONE (OUTPATIENT)
Age: 74
End: 2024-08-05

## 2024-08-05 NOTE — TELEPHONE ENCOUNTER
Patient called stating that when she was in on 8/1/24 she was going to get a calendar of her upcoming schedule sent overnight with [facility]in. She still has not received that yet and asking for it to be sent out to her again.

## 2024-08-05 NOTE — PROGRESS NOTES
Gynecologic Oncology Treatment Planning Conference Case Review     Primary Gynecologic Oncologist: Dr. Jacome    Clinical Summary: Nallely Peacock is a frail 74 y.o. with high grade endometrial cancer stage IIIC2 disease (retroperitoneal and aortocaval lymphadenopathy) vs IVB (possible bowel mesentery disease, small nodules on chest CT).  Please see EMR for full history, imaging, and laboratory studies.     Recommendations:   Unfortunately, not a candidate for the clinical trial we have open as she needs neoadjuvant chemotherapy. Recommend proceeding with neoadjuvant chemotherapy with carboplatin, Taxol, and dostarlimab x3 cycles followed by repeat imaging.     DISCLAIMERS:    TO THE TREATING PHYSICIAN:  This conference is a meeting of clinicians from various specialty areas who evaluate and discuss patients for whom a multidisciplinary treatment approach is being considered. Please note that the above opinion was a consensus of the conference attendees and is intended only to assist in quality care of the discussed patient.  The responsibility for follow up on the input given during the conference, along with any final decisions regarding plan of care, is that of the patient and the patient's provider. Accordingly, appointments have only been recommended based on this information and have NOT been scheduled unless otherwise noted.      TO THE PATIENT:  This summary is a brief record of major aspects of your cancer treatment. You may choose to share a copy with any of your doctors or nurses. However, this is not a detailed or comprehensive record of your care.    Dia Aburto  Gynecologic Oncology Fellow

## 2024-08-05 NOTE — TELEPHONE ENCOUNTER
Called to inform patient that her fed ex is being sent out today and she should receive it by tomorrow. If she does not to call the office back and let us know.

## 2024-08-07 RX ORDER — SODIUM CHLORIDE 9 MG/ML
30 INJECTION, SOLUTION INTRAVENOUS CONTINUOUS
OUTPATIENT
Start: 2024-08-07

## 2024-08-07 RX ORDER — VANCOMYCIN HYDROCHLORIDE 750 MG/150ML
15 INJECTION, SOLUTION INTRAVENOUS ONCE
OUTPATIENT
Start: 2024-08-07

## 2024-08-08 ENCOUNTER — APPOINTMENT (OUTPATIENT)
Dept: LAB | Facility: HOSPITAL | Age: 74
End: 2024-08-08
Payer: MEDICARE

## 2024-08-08 DIAGNOSIS — C54.1 ENDOMETRIAL CANCER (HCC): ICD-10-CM

## 2024-08-08 DIAGNOSIS — R53.81 OTHER MALAISE: ICD-10-CM

## 2024-08-08 DIAGNOSIS — Z00.00 ROUTINE GENERAL MEDICAL EXAMINATION AT A HEALTH CARE FACILITY: ICD-10-CM

## 2024-08-08 LAB
ALBUMIN SERPL BCG-MCNC: 3.4 G/DL (ref 3.5–5)
ALP SERPL-CCNC: 80 U/L (ref 34–104)
ALT SERPL W P-5'-P-CCNC: 6 U/L (ref 7–52)
AMYLASE SERPL-CCNC: 38 IU/L (ref 29–103)
ANION GAP SERPL CALCULATED.3IONS-SCNC: 13 MMOL/L (ref 4–13)
AST SERPL W P-5'-P-CCNC: 17 U/L (ref 13–39)
BASOPHILS # BLD AUTO: 0.06 THOUSANDS/ÂΜL (ref 0–0.1)
BASOPHILS NFR BLD AUTO: 1 % (ref 0–1)
BILIRUB SERPL-MCNC: 0.41 MG/DL (ref 0.2–1)
BUN SERPL-MCNC: 13 MG/DL (ref 5–25)
CALCIUM ALBUM COR SERPL-MCNC: 10.2 MG/DL (ref 8.3–10.1)
CALCIUM SERPL-MCNC: 9.7 MG/DL (ref 8.4–10.2)
CANCER AG125 SERPL-ACNC: 49.8 U/ML (ref 0–35)
CHLORIDE SERPL-SCNC: 102 MMOL/L (ref 96–108)
CO2 SERPL-SCNC: 25 MMOL/L (ref 21–32)
CREAT SERPL-MCNC: 0.77 MG/DL (ref 0.6–1.3)
EOSINOPHIL # BLD AUTO: 0.07 THOUSAND/ÂΜL (ref 0–0.61)
EOSINOPHIL NFR BLD AUTO: 1 % (ref 0–6)
ERYTHROCYTE [DISTWIDTH] IN BLOOD BY AUTOMATED COUNT: 13.2 % (ref 11.6–15.1)
EST. AVERAGE GLUCOSE BLD GHB EST-MCNC: 114 MG/DL
GFR SERPL CREATININE-BSD FRML MDRD: 75 ML/MIN/1.73SQ M
GLUCOSE SERPL-MCNC: 100 MG/DL (ref 65–140)
HBA1C MFR BLD: 5.6 %
HCT VFR BLD AUTO: 34.7 % (ref 34.8–46.1)
HGB BLD-MCNC: 10.6 G/DL (ref 11.5–15.4)
IMM GRANULOCYTES # BLD AUTO: 0.05 THOUSAND/UL (ref 0–0.2)
IMM GRANULOCYTES NFR BLD AUTO: 1 % (ref 0–2)
INR PPP: 1.06 (ref 0.85–1.19)
LIPASE SERPL-CCNC: 32 U/L (ref 11–82)
LYMPHOCYTES # BLD AUTO: 0.83 THOUSANDS/ÂΜL (ref 0.6–4.47)
LYMPHOCYTES NFR BLD AUTO: 8 % (ref 14–44)
MAGNESIUM SERPL-MCNC: 2.3 MG/DL (ref 1.9–2.7)
MCH RBC QN AUTO: 28 PG (ref 26.8–34.3)
MCHC RBC AUTO-ENTMCNC: 30.5 G/DL (ref 31.4–37.4)
MCV RBC AUTO: 92 FL (ref 82–98)
MONOCYTES # BLD AUTO: 0.44 THOUSAND/ÂΜL (ref 0.17–1.22)
MONOCYTES NFR BLD AUTO: 4 % (ref 4–12)
NEUTROPHILS # BLD AUTO: 8.64 THOUSANDS/ÂΜL (ref 1.85–7.62)
NEUTS SEG NFR BLD AUTO: 85 % (ref 43–75)
NRBC BLD AUTO-RTO: 0 /100 WBCS
PLATELET # BLD AUTO: 371 THOUSANDS/UL (ref 149–390)
PMV BLD AUTO: 10.6 FL (ref 8.9–12.7)
POTASSIUM SERPL-SCNC: 3.9 MMOL/L (ref 3.5–5.3)
PROT SERPL-MCNC: 7.2 G/DL (ref 6.4–8.4)
PROTHROMBIN TIME: 14.1 SECONDS (ref 12.3–15)
RBC # BLD AUTO: 3.78 MILLION/UL (ref 3.81–5.12)
SODIUM SERPL-SCNC: 140 MMOL/L (ref 135–147)
T4 FREE SERPL-MCNC: 1.49 NG/DL (ref 0.61–1.12)
TSH SERPL DL<=0.05 MIU/L-ACNC: <0.01 UIU/ML (ref 0.45–4.5)
WBC # BLD AUTO: 10.09 THOUSAND/UL (ref 4.31–10.16)

## 2024-08-08 PROCEDURE — 84439 ASSAY OF FREE THYROXINE: CPT

## 2024-08-08 PROCEDURE — 85610 PROTHROMBIN TIME: CPT

## 2024-08-08 PROCEDURE — 84443 ASSAY THYROID STIM HORMONE: CPT

## 2024-08-08 PROCEDURE — 82150 ASSAY OF AMYLASE: CPT

## 2024-08-08 PROCEDURE — 83735 ASSAY OF MAGNESIUM: CPT

## 2024-08-08 PROCEDURE — 83690 ASSAY OF LIPASE: CPT

## 2024-08-08 PROCEDURE — 36415 COLL VENOUS BLD VENIPUNCTURE: CPT

## 2024-08-08 PROCEDURE — 83036 HEMOGLOBIN GLYCOSYLATED A1C: CPT

## 2024-08-08 PROCEDURE — 86304 IMMUNOASSAY TUMOR CA 125: CPT

## 2024-08-08 PROCEDURE — 86901 BLOOD TYPING SEROLOGIC RH(D): CPT | Performed by: OBSTETRICS & GYNECOLOGY

## 2024-08-08 PROCEDURE — 80053 COMPREHEN METABOLIC PANEL: CPT

## 2024-08-08 PROCEDURE — 85025 COMPLETE CBC W/AUTO DIFF WBC: CPT

## 2024-08-08 PROCEDURE — 86900 BLOOD TYPING SEROLOGIC ABO: CPT | Performed by: OBSTETRICS & GYNECOLOGY

## 2024-08-08 PROCEDURE — 86850 RBC ANTIBODY SCREEN: CPT | Performed by: OBSTETRICS & GYNECOLOGY

## 2024-08-09 ENCOUNTER — LAB REQUISITION (OUTPATIENT)
Dept: LAB | Facility: HOSPITAL | Age: 74
End: 2024-08-09
Payer: MEDICARE

## 2024-08-09 DIAGNOSIS — Z00.00 ENCOUNTER FOR GENERAL ADULT MEDICAL EXAMINATION WITHOUT ABNORMAL FINDINGS: ICD-10-CM

## 2024-08-09 LAB
ABO GROUP BLD: NORMAL
BLD GP AB SCN SERPL QL: NEGATIVE
RH BLD: POSITIVE
SPECIMEN EXPIRATION DATE: NORMAL

## 2024-08-12 ENCOUNTER — TELEPHONE (OUTPATIENT)
Dept: INFUSION CENTER | Facility: HOSPITAL | Age: 74
End: 2024-08-12

## 2024-08-12 DIAGNOSIS — E05.90 HYPERTHYROIDISM: Primary | ICD-10-CM

## 2024-08-12 RX ORDER — PALONOSETRON 0.05 MG/ML
0.25 INJECTION, SOLUTION INTRAVENOUS ONCE
Status: CANCELLED | OUTPATIENT
Start: 2024-08-13

## 2024-08-12 RX ORDER — SODIUM CHLORIDE 9 MG/ML
20 INJECTION, SOLUTION INTRAVENOUS ONCE
Status: CANCELLED | OUTPATIENT
Start: 2024-08-13

## 2024-08-13 ENCOUNTER — HOSPITAL ENCOUNTER (OUTPATIENT)
Dept: INFUSION CENTER | Facility: HOSPITAL | Age: 74
Discharge: HOME | End: 2024-08-13
Attending: OBSTETRICS & GYNECOLOGY
Payer: MEDICARE

## 2024-08-13 VITALS
TEMPERATURE: 98 F | SYSTOLIC BLOOD PRESSURE: 99 MMHG | WEIGHT: 118.61 LBS | BODY MASS INDEX: 20.25 KG/M2 | OXYGEN SATURATION: 99 % | HEART RATE: 95 BPM | DIASTOLIC BLOOD PRESSURE: 56 MMHG | HEIGHT: 64 IN | RESPIRATION RATE: 18 BRPM

## 2024-08-13 DIAGNOSIS — C54.1 ENDOMETRIAL CANCER (HCC): Primary | ICD-10-CM

## 2024-08-13 PROCEDURE — 96375 TX/PRO/DX INJ NEW DRUG ADDON: CPT

## 2024-08-13 PROCEDURE — 96417 CHEMO IV INFUS EACH ADDL SEQ: CPT

## 2024-08-13 PROCEDURE — 96413 CHEMO IV INFUSION 1 HR: CPT

## 2024-08-13 PROCEDURE — 96415 CHEMO IV INFUSION ADDL HR: CPT

## 2024-08-13 PROCEDURE — 96367 TX/PROPH/DG ADDL SEQ IV INF: CPT

## 2024-08-13 RX ORDER — PALONOSETRON 0.05 MG/ML
0.25 INJECTION, SOLUTION INTRAVENOUS ONCE
Status: COMPLETED | OUTPATIENT
Start: 2024-08-13 | End: 2024-08-13

## 2024-08-13 RX ORDER — SODIUM CHLORIDE 9 MG/ML
20 INJECTION, SOLUTION INTRAVENOUS ONCE
Status: COMPLETED | OUTPATIENT
Start: 2024-08-13 | End: 2024-08-13

## 2024-08-13 RX ADMIN — SODIUM CHLORIDE 20 ML/HR: 0.9 INJECTION, SOLUTION INTRAVENOUS at 08:44

## 2024-08-13 RX ADMIN — CARBOPLATIN 389 MG: 10 INJECTION, SOLUTION INTRAVENOUS at 14:59

## 2024-08-13 RX ADMIN — FAMOTIDINE 20 MG: 10 INJECTION INTRAVENOUS at 09:33

## 2024-08-13 RX ADMIN — DIPHENHYDRAMINE HYDROCHLORIDE 25 MG: 50 INJECTION, SOLUTION INTRAMUSCULAR; INTRAVENOUS at 09:08

## 2024-08-13 RX ADMIN — PALONOSETRON HYDROCHLORIDE 0.25 MG: 0.25 INJECTION INTRAVENOUS at 08:46

## 2024-08-13 RX ADMIN — DEXAMETHASONE SODIUM PHOSPHATE 20 MG: 10 INJECTION, SOLUTION INTRAMUSCULAR; INTRAVENOUS at 08:45

## 2024-08-13 RX ADMIN — SODIUM CHLORIDE 500 MG: 9 INJECTION, SOLUTION INTRAVENOUS at 10:43

## 2024-08-13 RX ADMIN — PACLITAXEL 211.8 MG: 6 INJECTION, SOLUTION INTRAVENOUS at 11:35

## 2024-08-13 RX ADMIN — FOSAPREPITANT 150 MG: 150 INJECTION, POWDER, LYOPHILIZED, FOR SOLUTION INTRAVENOUS at 10:01

## 2024-08-13 NOTE — PROGRESS NOTES
..Nallely Peacock  tolerated treatment well with no complications.      Nallely Peacock is aware of future appt on 9/3 at 8:00.     AVS printed and given to Nallely Peacock: Yes

## 2024-08-14 ENCOUNTER — TELEPHONE (OUTPATIENT)
Dept: GYNECOLOGIC ONCOLOGY | Facility: CLINIC | Age: 74
End: 2024-08-14

## 2024-08-14 ENCOUNTER — TELEPHONE (OUTPATIENT)
Age: 74
End: 2024-08-14

## 2024-08-14 NOTE — TELEPHONE ENCOUNTER
Pt was called to inform her that an endocrinologist appointment was scheduled for her on 10/17/24 @ 9:00 am in [place].    Pt asked for our office and the endo office number to call back if she would need any help.    I provided both contact numbers.

## 2024-08-14 NOTE — TELEPHONE ENCOUNTER
Pt stated that she spoke with someone from Rosario's office today and stated she was rude at the time of the phone call and pt wants to apologize.    She is upset with the situation she is in. Pt states she regrets if her anger upset anyone in the office, or any staff at [facility]. Pt went on to state that the person she spoke with was helpful and informative.     Pt states that everyone at [facility] is so wonderful to her and she owes this person a sincere apology.     I promised pt I would pass this message along for her.

## 2024-08-14 NOTE — TELEPHONE ENCOUNTER
Patient called to discuss first chemo infusion yesterday. States she is feeling very well, and denies any nausea, vomiting, fevers. Discussed possible reaction symptoms to look out for as stated on infusion AVS.     No further questions at this time, patient thankful.

## 2024-08-16 ENCOUNTER — TELEPHONE (OUTPATIENT)
Age: 74
End: 2024-08-16

## 2024-08-16 NOTE — TELEPHONE ENCOUNTER
Patient called in with questions regarding last chemotherapy infusion on 08/13/2024. Discuss that chemotherapy medications typically stay within the body for a few days until the body filters it out. Also discussed if patient's begin to have side effects, side effects are more pronounced in first 1-2 weeks after infusion, and typically improve over time. Patient only relates very mild lower abdominal cramping that she states she has been managing with tylenol which has been effective. Relates no other side effects at this time.    No further questions at this time.

## 2024-08-20 ENCOUNTER — TELEPHONE (OUTPATIENT)
Age: 74
End: 2024-08-20

## 2024-08-20 ENCOUNTER — TELEPHONE (OUTPATIENT)
Dept: INTERVENTIONAL RADIOLOGY/VASCULAR | Facility: HOSPITAL | Age: 74
End: 2024-08-20

## 2024-08-22 ENCOUNTER — APPOINTMENT (OUTPATIENT)
Dept: LAB | Facility: HOSPITAL | Age: 74
End: 2024-08-22
Payer: MEDICARE

## 2024-08-22 DIAGNOSIS — C54.1 ENDOMETRIAL CANCER (HCC): ICD-10-CM

## 2024-08-22 DIAGNOSIS — R53.81 OTHER MALAISE: ICD-10-CM

## 2024-08-22 LAB
ALBUMIN SERPL BCG-MCNC: 3.5 G/DL (ref 3.5–5)
ALP SERPL-CCNC: 93 U/L (ref 34–104)
ALT SERPL W P-5'-P-CCNC: 10 U/L (ref 7–52)
AMYLASE SERPL-CCNC: 45 IU/L (ref 29–103)
ANION GAP SERPL CALCULATED.3IONS-SCNC: 12 MMOL/L (ref 4–13)
AST SERPL W P-5'-P-CCNC: 15 U/L (ref 13–39)
BASOPHILS # BLD AUTO: 0.04 THOUSANDS/ÂΜL (ref 0–0.1)
BASOPHILS NFR BLD AUTO: 1 % (ref 0–1)
BILIRUB SERPL-MCNC: 0.31 MG/DL (ref 0.2–1)
BUN SERPL-MCNC: 12 MG/DL (ref 5–25)
CALCIUM SERPL-MCNC: 9.3 MG/DL (ref 8.4–10.2)
CANCER AG125 SERPL-ACNC: 61.1 U/ML (ref 0–35)
CHLORIDE SERPL-SCNC: 101 MMOL/L (ref 96–108)
CO2 SERPL-SCNC: 27 MMOL/L (ref 21–32)
CREAT SERPL-MCNC: 0.6 MG/DL (ref 0.6–1.3)
EOSINOPHIL # BLD AUTO: 0.03 THOUSAND/ÂΜL (ref 0–0.61)
EOSINOPHIL NFR BLD AUTO: 1 % (ref 0–6)
ERYTHROCYTE [DISTWIDTH] IN BLOOD BY AUTOMATED COUNT: 13 % (ref 11.6–15.1)
GFR SERPL CREATININE-BSD FRML MDRD: 88 ML/MIN/1.73SQ M
GLUCOSE P FAST SERPL-MCNC: 80 MG/DL (ref 65–99)
HCT VFR BLD AUTO: 31.4 % (ref 34.8–46.1)
HGB BLD-MCNC: 9.9 G/DL (ref 11.5–15.4)
IMM GRANULOCYTES # BLD AUTO: 0.02 THOUSAND/UL (ref 0–0.2)
IMM GRANULOCYTES NFR BLD AUTO: 1 % (ref 0–2)
LIPASE SERPL-CCNC: 44 U/L (ref 11–82)
LYMPHOCYTES # BLD AUTO: 0.7 THOUSANDS/ÂΜL (ref 0.6–4.47)
LYMPHOCYTES NFR BLD AUTO: 22 % (ref 14–44)
MAGNESIUM SERPL-MCNC: 2.1 MG/DL (ref 1.9–2.7)
MCH RBC QN AUTO: 28.6 PG (ref 26.8–34.3)
MCHC RBC AUTO-ENTMCNC: 31.5 G/DL (ref 31.4–37.4)
MCV RBC AUTO: 91 FL (ref 82–98)
MONOCYTES # BLD AUTO: 0.2 THOUSAND/ÂΜL (ref 0.17–1.22)
MONOCYTES NFR BLD AUTO: 6 % (ref 4–12)
NEUTROPHILS # BLD AUTO: 2.14 THOUSANDS/ÂΜL (ref 1.85–7.62)
NEUTS SEG NFR BLD AUTO: 69 % (ref 43–75)
NRBC BLD AUTO-RTO: 0 /100 WBCS
PLATELET # BLD AUTO: 297 THOUSANDS/UL (ref 149–390)
PMV BLD AUTO: 10.3 FL (ref 8.9–12.7)
POTASSIUM SERPL-SCNC: 3.3 MMOL/L (ref 3.5–5.3)
PROT SERPL-MCNC: 7 G/DL (ref 6.4–8.4)
RBC # BLD AUTO: 3.46 MILLION/UL (ref 3.81–5.12)
SODIUM SERPL-SCNC: 140 MMOL/L (ref 135–147)
T4 FREE SERPL-MCNC: 1.49 NG/DL (ref 0.61–1.12)
TSH SERPL DL<=0.05 MIU/L-ACNC: 0.03 UIU/ML (ref 0.45–4.5)
WBC # BLD AUTO: 3.13 THOUSAND/UL (ref 4.31–10.16)

## 2024-08-22 PROCEDURE — 84439 ASSAY OF FREE THYROXINE: CPT

## 2024-08-22 PROCEDURE — 83690 ASSAY OF LIPASE: CPT

## 2024-08-22 PROCEDURE — 83735 ASSAY OF MAGNESIUM: CPT

## 2024-08-22 PROCEDURE — 80053 COMPREHEN METABOLIC PANEL: CPT

## 2024-08-22 PROCEDURE — 86304 IMMUNOASSAY TUMOR CA 125: CPT

## 2024-08-22 PROCEDURE — 84443 ASSAY THYROID STIM HORMONE: CPT

## 2024-08-22 PROCEDURE — 36415 COLL VENOUS BLD VENIPUNCTURE: CPT

## 2024-08-22 PROCEDURE — 82150 ASSAY OF AMYLASE: CPT

## 2024-08-22 PROCEDURE — 85025 COMPLETE CBC W/AUTO DIFF WBC: CPT

## 2024-08-26 ENCOUNTER — HOSPITAL ENCOUNTER (OUTPATIENT)
Dept: INTERVENTIONAL RADIOLOGY/VASCULAR | Facility: HOSPITAL | Age: 74
Discharge: HOME | End: 2024-08-26
Attending: STUDENT IN AN ORGANIZED HEALTH CARE EDUCATION/TRAINING PROGRAM | Admitting: RADIOLOGY
Payer: MEDICARE

## 2024-08-26 VITALS
HEART RATE: 76 BPM | RESPIRATION RATE: 20 BRPM | WEIGHT: 120 LBS | TEMPERATURE: 97.2 F | SYSTOLIC BLOOD PRESSURE: 102 MMHG | DIASTOLIC BLOOD PRESSURE: 54 MMHG | OXYGEN SATURATION: 98 % | HEIGHT: 64 IN | BODY MASS INDEX: 20.49 KG/M2

## 2024-08-26 DIAGNOSIS — C54.1 ENDOMETRIAL CANCER (HCC): ICD-10-CM

## 2024-08-26 PROCEDURE — 99152 MOD SED SAME PHYS/QHP 5/>YRS: CPT

## 2024-08-26 PROCEDURE — 36561 INSERT TUNNELED CV CATH: CPT | Performed by: RADIOLOGY

## 2024-08-26 PROCEDURE — 76937 US GUIDE VASCULAR ACCESS: CPT | Performed by: RADIOLOGY

## 2024-08-26 PROCEDURE — 76937 US GUIDE VASCULAR ACCESS: CPT

## 2024-08-26 PROCEDURE — C1894 INTRO/SHEATH, NON-LASER: HCPCS

## 2024-08-26 PROCEDURE — C1788 PORT, INDWELLING, IMP: HCPCS

## 2024-08-26 PROCEDURE — 99153 MOD SED SAME PHYS/QHP EA: CPT

## 2024-08-26 PROCEDURE — 77001 FLUOROGUIDE FOR VEIN DEVICE: CPT | Performed by: RADIOLOGY

## 2024-08-26 PROCEDURE — 36561 INSERT TUNNELED CV CATH: CPT

## 2024-08-26 PROCEDURE — 99152 MOD SED SAME PHYS/QHP 5/>YRS: CPT | Performed by: RADIOLOGY

## 2024-08-26 RX ORDER — MIDAZOLAM HYDROCHLORIDE 2 MG/2ML
INJECTION, SOLUTION INTRAMUSCULAR; INTRAVENOUS AS NEEDED
Status: COMPLETED | OUTPATIENT
Start: 2024-08-26 | End: 2024-08-26

## 2024-08-26 RX ORDER — FENTANYL CITRATE 50 UG/ML
INJECTION, SOLUTION INTRAMUSCULAR; INTRAVENOUS AS NEEDED
Status: COMPLETED | OUTPATIENT
Start: 2024-08-26 | End: 2024-08-26

## 2024-08-26 RX ORDER — SODIUM CHLORIDE 9 MG/ML
30 INJECTION, SOLUTION INTRAVENOUS CONTINUOUS
Status: DISCONTINUED | OUTPATIENT
Start: 2024-08-26 | End: 2024-08-27 | Stop reason: HOSPADM

## 2024-08-26 RX ORDER — VANCOMYCIN HYDROCHLORIDE 750 MG/150ML
15 INJECTION, SOLUTION INTRAVENOUS ONCE
Status: COMPLETED | OUTPATIENT
Start: 2024-08-26 | End: 2024-08-26

## 2024-08-26 RX ADMIN — VANCOMYCIN HYDROCHLORIDE 750 MG: 750 INJECTION, SOLUTION INTRAVENOUS at 10:20

## 2024-08-26 RX ADMIN — FENTANYL CITRATE 25 MCG: 50 INJECTION, SOLUTION INTRAMUSCULAR; INTRAVENOUS at 10:58

## 2024-08-26 RX ADMIN — MIDAZOLAM 0.5 MG: 1 INJECTION INTRAMUSCULAR; INTRAVENOUS at 10:47

## 2024-08-26 RX ADMIN — FENTANYL CITRATE 50 MCG: 50 INJECTION, SOLUTION INTRAMUSCULAR; INTRAVENOUS at 11:01

## 2024-08-26 RX ADMIN — MIDAZOLAM 0.5 MG: 1 INJECTION INTRAMUSCULAR; INTRAVENOUS at 10:14

## 2024-08-26 RX ADMIN — FENTANYL CITRATE 25 MCG: 50 INJECTION, SOLUTION INTRAMUSCULAR; INTRAVENOUS at 10:47

## 2024-08-26 RX ADMIN — MIDAZOLAM 0.5 MG: 1 INJECTION INTRAMUSCULAR; INTRAVENOUS at 10:11

## 2024-08-26 RX ADMIN — FENTANYL CITRATE 25 MCG: 50 INJECTION, SOLUTION INTRAMUSCULAR; INTRAVENOUS at 10:14

## 2024-08-26 RX ADMIN — FENTANYL CITRATE 25 MCG: 50 INJECTION, SOLUTION INTRAMUSCULAR; INTRAVENOUS at 10:11

## 2024-08-26 RX ADMIN — MIDAZOLAM 0.5 MG: 1 INJECTION INTRAMUSCULAR; INTRAVENOUS at 10:58

## 2024-08-26 RX ADMIN — MIDAZOLAM 0.5 MG: 1 INJECTION INTRAMUSCULAR; INTRAVENOUS at 11:01

## 2024-08-26 NOTE — DISCHARGE INSTRUCTIONS
Implanted Venous Access Port     WHAT YOU NEED TO KNOW:   An implanted venous access port is a device used to give treatments and take blood. It may also be called a central venous access device (CVAD). The port is a small container that is placed under your skin, usually in your upper chest. The port is attached to a catheter that enters a large vein.   DISCHARGE INSTRUCTIONS:   Resume your normal diet. Small sips of flat soda will help with mild nausea.  Prevent an infection:   Wash your hands often.  Use soap and water. Clean your hands before and after you care for your port. Remind everyone who cares for your port to wash their hands.   Check your skin for infection every day.  Look for redness, swelling, or fluid oozing from the port site.  Care for your port:   1. You may shower beginning 48 hours after procedure.     2.  Leave glue in place.    3. It is normal for some bruising to occur.    4. Use Tylenol for pain.    5. Limit use of arm on the side that your port was placed. Lift nothing heavier than 5 pounds for 1 week, and then gradually increase activity as tolerated.    6. DO NOT apply ointment, lotion or cream to port site until incision is healed. Allow glue to fall off. DO NOT attempt to peel glue from skin even it it begins to flake.     7. After the port incision is healed you may swim, bathe.  Notify the Interventional Radiologist if you have any of the followin. Fever above 101 F    2. Increased redness or swelling after 1st day.     3. Increased pain after 1st day.    4. Any sign of infection (drainage from port site, skin separation, hot to touch).    5. Persistent nausea or vomiting.    Contact Interventional Radiology at [phone]

## 2024-08-26 NOTE — BRIEF OP NOTE (RAD/CATH)
INTERVENTIONAL RADIOLOGY PROCEDURE NOTE    Date: 8/26/2024    Procedure:   Procedure Summary       Date: 08/26/24 Room / Location: [facility] Interventional Radiology    Anesthesia Start:  Anesthesia Stop:     Procedure: IR PORT PLACEMENT Diagnosis:       Endometrial cancer (HCC)      (chemotherapy)    Scheduled Providers:  Responsible Provider:     Anesthesia Type: Not recorded ASA Status: Not recorded            Preoperative diagnosis:   1. Endometrial cancer (HCC)         Postoperative diagnosis: Same.    Surgeon: Kesha Santiago MD     Assistant: None. No qualified resident was available.    Blood loss: Minimal    Specimens: None     Findings:     Successful port placement.    Complications: None immediate.    Anesthesia: conscious sedation

## 2024-08-29 ENCOUNTER — APPOINTMENT (OUTPATIENT)
Dept: LAB | Facility: HOSPITAL | Age: 74
End: 2024-08-29
Payer: MEDICARE

## 2024-08-29 ENCOUNTER — OFFICE VISIT (OUTPATIENT)
Age: 74
End: 2024-08-29
Payer: MEDICARE

## 2024-08-29 VITALS
DIASTOLIC BLOOD PRESSURE: 58 MMHG | TEMPERATURE: 98.5 F | HEIGHT: 64 IN | SYSTOLIC BLOOD PRESSURE: 146 MMHG | BODY MASS INDEX: 20.04 KG/M2 | WEIGHT: 117.4 LBS | RESPIRATION RATE: 16 BRPM | OXYGEN SATURATION: 100 % | HEART RATE: 167 BPM

## 2024-08-29 DIAGNOSIS — C54.1 ENDOMETRIAL CANCER (HCC): Primary | ICD-10-CM

## 2024-08-29 DIAGNOSIS — E05.90 HYPERTHYROIDISM: ICD-10-CM

## 2024-08-29 DIAGNOSIS — R53.81 OTHER MALAISE: ICD-10-CM

## 2024-08-29 DIAGNOSIS — C54.1 ENDOMETRIAL CANCER (HCC): ICD-10-CM

## 2024-08-29 LAB
ALBUMIN SERPL BCG-MCNC: 3.5 G/DL (ref 3.5–5)
ALP SERPL-CCNC: 99 U/L (ref 34–104)
ALT SERPL W P-5'-P-CCNC: 9 U/L (ref 7–52)
AMYLASE SERPL-CCNC: 45 IU/L (ref 29–103)
ANION GAP SERPL CALCULATED.3IONS-SCNC: 12 MMOL/L (ref 4–13)
AST SERPL W P-5'-P-CCNC: 13 U/L (ref 13–39)
BASOPHILS # BLD AUTO: 0.05 THOUSANDS/ÂΜL (ref 0–0.1)
BASOPHILS NFR BLD AUTO: 1 % (ref 0–1)
BILIRUB SERPL-MCNC: 0.3 MG/DL (ref 0.2–1)
BUN SERPL-MCNC: 11 MG/DL (ref 5–25)
CALCIUM SERPL-MCNC: 9.1 MG/DL (ref 8.4–10.2)
CANCER AG125 SERPL-ACNC: 33.7 U/ML (ref 0–35)
CHLORIDE SERPL-SCNC: 102 MMOL/L (ref 96–108)
CO2 SERPL-SCNC: 27 MMOL/L (ref 21–32)
CREAT SERPL-MCNC: 0.76 MG/DL (ref 0.6–1.3)
EOSINOPHIL # BLD AUTO: 0.03 THOUSAND/ÂΜL (ref 0–0.61)
EOSINOPHIL NFR BLD AUTO: 1 % (ref 0–6)
ERYTHROCYTE [DISTWIDTH] IN BLOOD BY AUTOMATED COUNT: 14.7 % (ref 11.6–15.1)
GFR SERPL CREATININE-BSD FRML MDRD: 76 ML/MIN/1.73SQ M
GLUCOSE P FAST SERPL-MCNC: 96 MG/DL (ref 65–99)
HCT VFR BLD AUTO: 32.6 % (ref 34.8–46.1)
HGB BLD-MCNC: 10 G/DL (ref 11.5–15.4)
IMM GRANULOCYTES # BLD AUTO: 0.02 THOUSAND/UL (ref 0–0.2)
IMM GRANULOCYTES NFR BLD AUTO: 0 % (ref 0–2)
LIPASE SERPL-CCNC: 47 U/L (ref 11–82)
LYMPHOCYTES # BLD AUTO: 1.17 THOUSANDS/ÂΜL (ref 0.6–4.47)
LYMPHOCYTES NFR BLD AUTO: 24 % (ref 14–44)
MAGNESIUM SERPL-MCNC: 2.1 MG/DL (ref 1.9–2.7)
MCH RBC QN AUTO: 28.2 PG (ref 26.8–34.3)
MCHC RBC AUTO-ENTMCNC: 30.7 G/DL (ref 31.4–37.4)
MCV RBC AUTO: 92 FL (ref 82–98)
MONOCYTES # BLD AUTO: 0.37 THOUSAND/ÂΜL (ref 0.17–1.22)
MONOCYTES NFR BLD AUTO: 8 % (ref 4–12)
NEUTROPHILS # BLD AUTO: 3.17 THOUSANDS/ÂΜL (ref 1.85–7.62)
NEUTS SEG NFR BLD AUTO: 66 % (ref 43–75)
NRBC BLD AUTO-RTO: 0 /100 WBCS
PLATELET # BLD AUTO: 218 THOUSANDS/UL (ref 149–390)
PMV BLD AUTO: 10 FL (ref 8.9–12.7)
POTASSIUM SERPL-SCNC: 3.5 MMOL/L (ref 3.5–5.3)
PROT SERPL-MCNC: 6.5 G/DL (ref 6.4–8.4)
RBC # BLD AUTO: 3.54 MILLION/UL (ref 3.81–5.12)
SODIUM SERPL-SCNC: 141 MMOL/L (ref 135–147)
T4 FREE SERPL-MCNC: 1.6 NG/DL (ref 0.61–1.12)
TSH SERPL DL<=0.05 MIU/L-ACNC: 0.02 UIU/ML (ref 0.45–4.5)
WBC # BLD AUTO: 4.81 THOUSAND/UL (ref 4.31–10.16)

## 2024-08-29 PROCEDURE — 84439 ASSAY OF FREE THYROXINE: CPT

## 2024-08-29 PROCEDURE — 99215 OFFICE O/P EST HI 40 MIN: CPT | Performed by: OBSTETRICS & GYNECOLOGY

## 2024-08-29 PROCEDURE — 36415 COLL VENOUS BLD VENIPUNCTURE: CPT

## 2024-08-29 PROCEDURE — 84443 ASSAY THYROID STIM HORMONE: CPT

## 2024-08-29 PROCEDURE — 83690 ASSAY OF LIPASE: CPT

## 2024-08-29 PROCEDURE — 83735 ASSAY OF MAGNESIUM: CPT

## 2024-08-29 PROCEDURE — 80053 COMPREHEN METABOLIC PANEL: CPT

## 2024-08-29 PROCEDURE — 82150 ASSAY OF AMYLASE: CPT

## 2024-08-29 PROCEDURE — G2211 COMPLEX E/M VISIT ADD ON: HCPCS | Performed by: OBSTETRICS & GYNECOLOGY

## 2024-08-29 PROCEDURE — 85025 COMPLETE CBC W/AUTO DIFF WBC: CPT

## 2024-08-29 PROCEDURE — 86304 IMMUNOASSAY TUMOR CA 125: CPT

## 2024-08-29 NOTE — ASSESSMENT & PLAN NOTE
74 y.o. with stage IIIC2 vs IVB high-grade endometrial cancer most recently s/p C1 carboplatin AUC 5, taxol 135mg/m2, and dostarlimab 500mg [id number] who presents for pre-cycle 2 visit. She is tolerating treatment well without adverse effects. Her vaginal bleeding has resolved. I have personally reviewed labs, although labs are pending from today. Her performance status is 0.   We will continue treatment at present doses as above.  Follow-up CT imaging after cycle 3.

## 2024-08-29 NOTE — PROGRESS NOTES
Assessment/Plan:    Problem List Items Addressed This Visit       Hyperthyroidism     Continue to trend, will likely have hypothyroid effects from immunotherapy treatment.          Endometrial cancer (HCC) - Primary     74 y.o. with stage IIIC2 vs IVB high-grade endometrial cancer most recently s/p C1 carboplatin AUC 5, taxol 135mg/m2, and dostarlimab 500mg [id number] who presents for pre-cycle 2 visit. She is tolerating treatment well without adverse effects. Her vaginal bleeding has resolved. I have personally reviewed labs, although labs are pending from today. Her performance status is 0.   We will continue treatment at present doses as above.  Follow-up CT imaging after cycle 3.               CHIEF COMPLAINT: pre-cycle 2       Problem:   Cancer Staging   No matching staging information was found for the patient.        Previous therapy:  Oncology History   Endometrial cancer (HCC)   7/18/2024 Initial Diagnosis    Endometrial cancer (HCC)     8/13/2024 -  Chemotherapy    alteplase (CATHFLO), 2 mg, Intracatheter, Every 1 Minute as needed, 1 of 12 cycles  palonosetron (ALOXI), 0.25 mg, Intravenous, Once, 1 of 6 cycles  Administration: 0.25 mg (8/13/2024)  fosaprepitant (EMEND) IVPB, 150 mg, Intravenous, Once, 1 of 6 cycles  Administration: 150 mg (8/13/2024)  dostarlimab-gxly (JEMPERLI) IVPB, 500 mg, Intravenous, Once, 1 of 12 cycles  Administration: 500 mg (8/13/2024)  CARBOplatin (PARAPLATIN) IVPB (GOG AUC DOSING), 389 mg, Intravenous, Once, 1 of 6 cycles  Administration: 389 mg (8/13/2024)  PACLItaxel (TAXOL) chemo IVPB, 135 mg/m2 = 211.8 mg (77.1 % of original dose 175 mg/m2), Intravenous, Once, 1 of 6 cycles  Dose modification: 135 mg/m2 (original dose 175 mg/m2, Cycle 1, Reason: Other (Must fill in a comment), Comment: prescribed starting dose)  Administration: 211.8 mg (8/13/2024)     8/29/2024 -  Cancer Staged    clinical stage IIIC2 disease (retroperitoneal and aortocaval lymphadenopathy) vs IVB (possible  bowel mesentery disease) based on imaging  Staging form: Corpus Uteri - Carcinoma, AJCC 8th Edition  - Clinical: FIGO Stage IIIC2 - Signed by Mansoor Alonso MD on 2024  Histopathologic type: Endometrioid adenocarcinoma, NOS  Stage prefix: Initial diagnosis  Histologic grade (G): G3  Histologic grading system: 3 grade system  Specimen type: Biopsy / Limited Resection             Patient ID: Nallely Peacock is a 74 y.o. female  with stage IIIC2 vs IVB high-grade endometrial cancer most recently s/p C1 carboplatin AUC 5, taxol 135mg/m2, and dostarlimab 500mg [id number] who presents for pre-cycle 2 visit. She is tolerating treatment well without adverse effects. Her vaginal bleeding has resolved. She denies numbness and tingling in her fingers and toes. She has some healing wounds on her arms due to walking into the wall in her bathroom. She is starting to lose her hair and has purchased a wig. She is able to continue to do her activities of daily living. She endorses difficulty with appetite since her   in April. Her daughter has been helping around the home.         The following portions of the patient's history were reviewed and updated as appropriate: allergies, current medications, past family history, past medical history, past social history, past surgical history, and problem list.    Review of Systems   Constitutional:  Negative for chills and fever.   Respiratory:  Negative for shortness of breath.    Cardiovascular:  Negative for chest pain.   Gastrointestinal:  Negative for abdominal pain, constipation, diarrhea, nausea and vomiting.   Genitourinary:  Negative for pelvic pain and vaginal bleeding.   Skin:  Positive for wound.   Neurological:  Negative for weakness and numbness.       Current Outpatient Medications   Medication Sig Dispense Refill    acetaminophen (TYLENOL) 160 MG chewable tablet Chew 160 mg every 6 (six) hours as needed for mild pain      amLODIPine (NORVASC) 10 mg tablet  "Take 1 tablet (10 mg total) by mouth daily 90 tablet 3    calcium carbonate (Calcium 600) 600 MG tablet Take 600 mg by mouth 2 (two) times a day with meals      LORazepam (ATIVAN) 1 mg tablet Take 1 tablet (1 mg total) by mouth every 8 (eight) hours as needed (nausea or anxiety) 20 tablet 0    Multiple Vitamin (MULTIVITAMIN ADULT PO) Take 1 tablet by mouth daily      ondansetron (ZOFRAN) 8 mg tablet Take 1 tablet (8 mg total) by mouth every 8 (eight) hours as needed for nausea or vomiting 30 tablet 1    simvastatin (ZOCOR) 10 mg tablet Take 1 tablet (10 mg total) by mouth daily at bedtime 90 tablet 3     No current facility-administered medications for this visit.           Objective:    Blood pressure 146/58, pulse (!) 167, temperature 98.5 °F (36.9 °C), temperature source Temporal, resp. rate 16, height 5' 4\" (1.626 m), weight 53.3 kg (117 lb 6.4 oz), SpO2 100%.  Body mass index is 20.15 kg/m².  Body surface area is 1.56 meters squared.    Physical Exam  Constitutional:       General: She is not in acute distress.     Appearance: Normal appearance.   HENT:      Head: Normocephalic and atraumatic.   Neck:      Comments: Port site c/d/I.healing well   Pulmonary:      Effort: Pulmonary effort is normal.      Breath sounds: No stridor.   Abdominal:      General: There is no distension.      Palpations: Abdomen is soft.   Musculoskeletal:         General: Normal range of motion.      Cervical back: Normal range of motion.      Right lower leg: No edema.      Left lower leg: No edema.   Skin:     General: Skin is warm and dry.   Neurological:      General: No focal deficit present.      Mental Status: She is alert and oriented to person, place, and time.   Psychiatric:         Mood and Affect: Mood normal.         Behavior: Behavior normal.           Lab Results   Component Value Date    K 3.3 (L) 08/22/2024     08/22/2024    CO2 27 08/22/2024    BUN 12 08/22/2024    CREATININE 0.60 08/22/2024    GLUF 80 " 08/22/2024    CALCIUM 9.3 08/22/2024    CORRECTEDCA 10.2 (H) 08/08/2024    AST 15 08/22/2024    ALT 10 08/22/2024    ALKPHOS 93 08/22/2024    EGFR 88 08/22/2024     Lab Results   Component Value Date    WBC 3.13 (L) 08/22/2024    HGB 9.9 (L) 08/22/2024    HCT 31.4 (L) 08/22/2024    MCV 91 08/22/2024     08/22/2024     Lab Results   Component Value Date    NEUTROABS 2.14 08/22/2024        Trend:  Lab Results   Component Value Date     61.1 (H) 08/22/2024     49.8 (H) 08/08/2024

## 2024-08-30 ENCOUNTER — PATIENT OUTREACH (OUTPATIENT)
Dept: HEMATOLOGY ONCOLOGY | Facility: CLINIC | Age: 74
End: 2024-08-30

## 2024-08-30 RX ORDER — SODIUM CHLORIDE 9 MG/ML
20 INJECTION, SOLUTION INTRAVENOUS ONCE
OUTPATIENT
Start: 2024-09-03

## 2024-08-30 RX ORDER — PALONOSETRON 0.05 MG/ML
0.25 INJECTION, SOLUTION INTRAVENOUS ONCE
OUTPATIENT
Start: 2024-09-03

## 2024-08-30 NOTE — PROGRESS NOTES
Called pt to check in, she stated she is doing well. She wanted to confirm the date, time and location of her next scheduled treatment.  I confirmed all of this with her. She stated she has no concerns at this time. She was thankful for the call, and has my contact information if she needs anything

## 2024-09-03 ENCOUNTER — HOSPITAL ENCOUNTER (OUTPATIENT)
Dept: INFUSION CENTER | Facility: HOSPITAL | Age: 74
Discharge: HOME | End: 2024-09-03
Attending: OBSTETRICS & GYNECOLOGY
Payer: MEDICARE

## 2024-09-03 ENCOUNTER — RA CDI HCC (OUTPATIENT)
Dept: OTHER | Facility: HOSPITAL | Age: 74
End: 2024-09-03

## 2024-09-03 VITALS
BODY MASS INDEX: 20.59 KG/M2 | HEART RATE: 85 BPM | RESPIRATION RATE: 16 BRPM | WEIGHT: 120.59 LBS | HEIGHT: 64 IN | OXYGEN SATURATION: 100 % | SYSTOLIC BLOOD PRESSURE: 108 MMHG | TEMPERATURE: 97.8 F | DIASTOLIC BLOOD PRESSURE: 73 MMHG

## 2024-09-03 DIAGNOSIS — C54.1 ENDOMETRIAL CANCER (HCC): Primary | ICD-10-CM

## 2024-09-03 PROCEDURE — 96417 CHEMO IV INFUS EACH ADDL SEQ: CPT

## 2024-09-03 PROCEDURE — 96375 TX/PRO/DX INJ NEW DRUG ADDON: CPT

## 2024-09-03 PROCEDURE — 96367 TX/PROPH/DG ADDL SEQ IV INF: CPT

## 2024-09-03 PROCEDURE — 96415 CHEMO IV INFUSION ADDL HR: CPT

## 2024-09-03 PROCEDURE — 96413 CHEMO IV INFUSION 1 HR: CPT

## 2024-09-03 RX ORDER — SODIUM CHLORIDE 9 MG/ML
20 INJECTION, SOLUTION INTRAVENOUS ONCE
Status: COMPLETED | OUTPATIENT
Start: 2024-09-03 | End: 2024-09-03

## 2024-09-03 RX ORDER — PALONOSETRON 0.05 MG/ML
0.25 INJECTION, SOLUTION INTRAVENOUS ONCE
Status: COMPLETED | OUTPATIENT
Start: 2024-09-03 | End: 2024-09-03

## 2024-09-03 RX ADMIN — SODIUM CHLORIDE 500 MG: 9 INJECTION, SOLUTION INTRAVENOUS at 10:45

## 2024-09-03 RX ADMIN — PALONOSETRON HYDROCHLORIDE 0.25 MG: 0.25 INJECTION INTRAVENOUS at 09:04

## 2024-09-03 RX ADMIN — DEXAMETHASONE SODIUM PHOSPHATE 20 MG: 10 INJECTION, SOLUTION INTRAMUSCULAR; INTRAVENOUS at 08:35

## 2024-09-03 RX ADMIN — FAMOTIDINE 20 MG: 10 INJECTION INTRAVENOUS at 09:32

## 2024-09-03 RX ADMIN — CARBOPLATIN 392.5 MG: 10 INJECTION, SOLUTION INTRAVENOUS at 15:50

## 2024-09-03 RX ADMIN — SODIUM CHLORIDE 20 ML/HR: 9 INJECTION, SOLUTION INTRAVENOUS at 08:34

## 2024-09-03 RX ADMIN — PACLITAXEL 211.8 MG: 6 INJECTION, SOLUTION INTRAVENOUS at 12:50

## 2024-09-03 RX ADMIN — FOSAPREPITANT 150 MG: 150 INJECTION, POWDER, LYOPHILIZED, FOR SOLUTION INTRAVENOUS at 09:55

## 2024-09-03 RX ADMIN — DIPHENHYDRAMINE HYDROCHLORIDE 25 MG: 50 INJECTION, SOLUTION INTRAMUSCULAR; INTRAVENOUS at 09:04

## 2024-09-03 NOTE — PROGRESS NOTES
Nallely Peacock  tolerated remainder of treatment with no further complications.      Nallely Peacock is aware of future appt on 9/24 at 8am.     AVS printed and given to Nallely Peacock:  Yes

## 2024-09-03 NOTE — PROGRESS NOTES
Patient complained of chest pressure. Infusion of Jemperli was stopped and vital signs were taken. After approx 5 mins, patient's symptoms resolved. VS repeated and remained stable. Rosario Mares PA-C was notified and advised us to restart the infusion but infuse at rate of 73.3 which meant the medication would infuse over 90 minutes of the total volume. Patient had received 70ml or the 110 total volume. The remaining volume was 40ml, which then the rate was changed to 73.3ml/hr.

## 2024-09-05 ENCOUNTER — PATIENT OUTREACH (OUTPATIENT)
Dept: CASE MANAGEMENT | Facility: HOSPITAL | Age: 74
End: 2024-09-05

## 2024-09-05 NOTE — PROGRESS NOTES
Biopsychosocial and Barriers Assessment    Type of Cancer: Endometrial Cancer  Treatment plan: Pt states she needs 3-4 chemo treatments  Noted barriers to care: None  Cultural/Mormonism concerns: None  Hair Loss/ Wig resources needed: Yes and pt has already purchased a wig    DT completed: yes  DT score: 2  Issues noted: none per report    Marital status/Lives with: Alone  Pt's support system: Daughter and son-in-law  Mental Health history: None  Substance Abuse: None    Employment/income source: pt is retired, SSI and pension  Concerns with bills (treatment vs household): None  Noted issues with home: No    Narrative note:     LSW received a referral for this pt from [facility].  Outreach was made this day and the pt was open and receptive of this call.  The role of the  was described and explained and contact information was provided.  The pt shared that she had her first chemo and felt quite sick afterwards.  She had her second chemo earlier this week and she reports to feeling well.  Pt was asking if she may have been given a different treatment.  LSW explained that all of her treatments will be the same and that she may have been anxious and overwhelmed and this may have caused to her not feeling well after the first.  The pt states that she lives alone, after being newly  in April of this year.  She was quite tearful in talking about her  their life together.  The pt shared how they lived in the [place] for 50 years before moving to [place].  Her daughter wanted her parents closer to her and in a home that was one floor.  The pt was tearful as she states that she wished her  had more time in the new home.  He was with her for 3 years in the home before he passed. The pt finds solace in her family, grandchildren and her kip.  She is hopeful she will only need these few treatments and need nothing further.  LSW offered significant support through the call.  The pt  does not report any specific social work need at this time.  She was encouraged to call if her needs should change.

## 2024-09-09 ENCOUNTER — TELEPHONE (OUTPATIENT)
Age: 74
End: 2024-09-09

## 2024-09-09 ENCOUNTER — TELEPHONE (OUTPATIENT)
Dept: GYNECOLOGIC ONCOLOGY | Facility: CLINIC | Age: 74
End: 2024-09-09

## 2024-09-09 NOTE — TELEPHONE ENCOUNTER
Fyi:  Pt called with questions about her bloodwork,she's a little confused,states she's going for labs on 9/12 and then again on 9/19  Pt due for C3 9/24 Pt has OV sched 9/19 preC3. Discussed with pt. Pls advise.

## 2024-09-09 NOTE — TELEPHONE ENCOUNTER
Patient needs weekly labs regardless of when chemo is. Even though chemo is every 3 weeks, her labs are to be done weekly. Thanks!

## 2024-09-09 NOTE — TELEPHONE ENCOUNTER
Called to inform patient that she is supposed to go weekly for lab work. Patient stated she didn't realize this and will go and get her labs drawn weekly.   Diabetic visit information    BP Readings from Last 3 Encounters:   04/23/21 124/88   01/04/21 110/79   12/15/20 134/80       Hemoglobin A1C (%)   Date Value   04/23/2021 9.2   09/17/2020 8.9 (H)     Microalb/Crt. Ratio (mcg/mg creat)   Date Value   04/23/2021 83 (H)     LDL Cholesterol (mg/dL)   Date Value   09/17/2020                    Have you changed or started any medications since your last visit including any over-the-counter medicines, vitamins, or herbal medicines? no   Have you stopped taking any of your medications? Is so, why? -  no  Are you having any side effects from any of your medications? - no    Have you seen any other physician or provider since your last visit?  no   Have you had any other diagnostic tests since your last visit? yes - Labs   Have you been seen in the emergency room and/or had an admission in a hospital since we last saw you?  no     Have you had your annual diabetic retinal (eye) exam? No   (ensure copy of exam is in the chart)    Have you had your routine dental cleaning in the past 6 months? no    Do you have an active [employer] account? If not, what are your barriers? Yes    Patient Care Team:  Ignacia Naranjo DO as PCP - General (Family Medicine)  Ignacia Naranjo,  as PCP - [facility] Provider    Medical history Review  Past Medical, Family, and Social History reviewed and does contribute to the patient presenting condition.     Health Maintenance   Topic Date Due    Hepatitis C screen  Never done    Diabetic foot exam  Never done    Diabetic retinal exam  Never done    COVID-19 Vaccine (1) Never done    HIV screen  Never done    Cervical cancer screen  Never done    Breast cancer screen  Never done    Shingles Vaccine (1 of 2) Never done    Colon cancer screen colonoscopy  Never done    DEXA (modify frequency per FRAX score)  Never done    A1C test (Diabetic or Prediabetic)  07/23/2021    Lipid screen  09/17/2021    Annual Wellness Visit (AWV) 10/20/2021    Diabetic microalbuminuria test  04/23/2022    Potassium monitoring  04/23/2022    Creatinine monitoring  04/23/2022    Pneumococcal 65+ years Vaccine (1 of 1 - PPSV23) 10/02/2025    DTaP/Tdap/Td vaccine (2 - Td) 10/02/2030    Flu vaccine  Completed    Hepatitis A vaccine  Aged Out    Hib vaccine  Aged Out    Meningococcal (ACWY) vaccine  Aged Out

## 2024-09-10 ENCOUNTER — OFFICE VISIT (OUTPATIENT)
Dept: FAMILY MEDICINE CLINIC | Facility: HOSPITAL | Age: 74
End: 2024-09-10
Payer: MEDICARE

## 2024-09-10 VITALS
TEMPERATURE: 98.9 F | HEIGHT: 64 IN | SYSTOLIC BLOOD PRESSURE: 142 MMHG | HEART RATE: 78 BPM | BODY MASS INDEX: 20.73 KG/M2 | WEIGHT: 121.4 LBS | DIASTOLIC BLOOD PRESSURE: 81 MMHG | OXYGEN SATURATION: 98 %

## 2024-09-10 DIAGNOSIS — I35.0 MODERATE AORTIC STENOSIS: ICD-10-CM

## 2024-09-10 DIAGNOSIS — C54.1 ENDOMETRIAL CANCER (HCC): ICD-10-CM

## 2024-09-10 DIAGNOSIS — E05.90 HYPERTHYROIDISM: ICD-10-CM

## 2024-09-10 DIAGNOSIS — I10 ESSENTIAL (PRIMARY) HYPERTENSION: Primary | ICD-10-CM

## 2024-09-10 DIAGNOSIS — R00.0 TACHYCARDIA DETERMINED BY EXAMINATION OF PULSE: ICD-10-CM

## 2024-09-10 DIAGNOSIS — E44.1 MILD PROTEIN-CALORIE MALNUTRITION (HCC): ICD-10-CM

## 2024-09-10 PROCEDURE — 99214 OFFICE O/P EST MOD 30 MIN: CPT | Performed by: FAMILY MEDICINE

## 2024-09-10 NOTE — PROGRESS NOTES
Ambulatory Visit  Name: Nallely Peacock      : 1950      MRN: [id number]  Encounter Provider: Nithin Carlson MD  Encounter Date: 9/10/2024   Encounter department: [facility] [address]     Assessment & Plan  Essential (primary) hypertension  Good BP control       Endometrial cancer (HCC)  F/U with Gyn Onc       Moderate aortic stenosis         Hyperthyroidism         Tachycardia determined by examination of pulse    Orders:    POCT ECG    Mild protein-calorie malnutrition (HCC)  Malnutrition Findings:                                 BMI Findings:           Body mass index is 20.83 kg/m².                 History of Present Illness     3 month follow up    Dealing with endometrial cancer      Review of Systems   Constitutional:  Negative for unexpected weight change.   Respiratory: Negative.     Cardiovascular: Negative.    Gastrointestinal: Negative.    Genitourinary:  Negative for difficulty urinating.   Neurological: Negative.    Hematological: Negative.    Psychiatric/Behavioral:  Negative for decreased concentration and dysphoric mood.      Past Medical History:   Diagnosis Date    High blood pressure     Hyperlipidemia      Past Surgical History:   Procedure Laterality Date    COLONOSCOPY      IR PORT PLACEMENT  2024    LEG SURGERY Right     inserted ebenezer 2021     History reviewed. No pertinent family history.  Social History     Tobacco Use    Smoking status: Former     Current packs/day: 0.00     Types: Cigarettes     Quit date:      Years since quittin.7     Passive exposure: Never    Smokeless tobacco: Never   Vaping Use    Vaping status: Never Used   Substance and Sexual Activity    Alcohol use: Not Currently    Drug use: Not Currently    Sexual activity: Not on file     Current Outpatient Medications on File Prior to Visit   Medication Sig    acetaminophen (TYLENOL) 160 MG chewable tablet Chew 160 mg every 6 (six) hours as needed for mild pain (Patient  "not taking: Reported on 9/19/2024)    amLODIPine (NORVASC) 10 mg tablet Take 1 tablet (10 mg total) by mouth daily    calcium carbonate (Calcium 600) 600 MG tablet Take 600 mg by mouth 2 (two) times a day with meals    LORazepam (ATIVAN) 1 mg tablet Take 1 tablet (1 mg total) by mouth every 8 (eight) hours as needed (nausea or anxiety)    Multiple Vitamin (MULTIVITAMIN ADULT PO) Take 1 tablet by mouth daily    ondansetron (ZOFRAN) 8 mg tablet Take 1 tablet (8 mg total) by mouth every 8 (eight) hours as needed for nausea or vomiting    simvastatin (ZOCOR) 10 mg tablet Take 1 tablet (10 mg total) by mouth daily at bedtime     Allergies   Allergen Reactions    Citrus - Food Allergy Hives    Codeine Hives    Strawberry (Diagnostic) - Food Allergy Hives    Penicillin G Rash     Immunization History   Administered Date(s) Administered    COVID-19 PFIZER VACCINE 0.3 ML IM 04/01/2021, 04/22/2021, 11/10/2021    Influenza, high dose seasonal 0.7 mL 12/25/2021, 11/25/2022    Influenza, seasonal, injectable 02/15/2022     Objective     /81 (BP Location: Left arm, Patient Position: Sitting, Cuff Size: Standard)   Pulse 78   Temp 98.9 °F (37.2 °C) (Tympanic)   Ht 5' 4.02\" (1.626 m)   Wt 55.1 kg (121 lb 6.4 oz)   LMP  (LMP Unknown)   SpO2 98%   BMI 20.83 kg/m²     Physical Exam  Vitals and nursing note reviewed.   Cardiovascular:      Rate and Rhythm: Normal rate and regular rhythm.      Heart sounds: Murmur heard.   Pulmonary:      Effort: Pulmonary effort is normal.      Breath sounds: Normal breath sounds.   Musculoskeletal:      Right lower leg: No edema.      Left lower leg: No edema.   Neurological:      Mental Status: She is alert and oriented to person, place, and time.   Psychiatric:         Mood and Affect: Mood normal.         "

## 2024-09-10 NOTE — ASSESSMENT & PLAN NOTE
Malnutrition Findings:                                 BMI Findings:           Body mass index is 20.83 kg/m².

## 2024-09-12 ENCOUNTER — APPOINTMENT (OUTPATIENT)
Dept: LAB | Facility: HOSPITAL | Age: 74
End: 2024-09-12
Payer: MEDICARE

## 2024-09-12 DIAGNOSIS — R53.81 OTHER MALAISE: ICD-10-CM

## 2024-09-12 DIAGNOSIS — C54.1 ENDOMETRIAL CANCER (HCC): ICD-10-CM

## 2024-09-12 LAB
ALBUMIN SERPL BCG-MCNC: 3.8 G/DL (ref 3.5–5)
ALP SERPL-CCNC: 78 U/L (ref 34–104)
ALT SERPL W P-5'-P-CCNC: 12 U/L (ref 7–52)
AMYLASE SERPL-CCNC: 63 IU/L (ref 29–103)
ANION GAP SERPL CALCULATED.3IONS-SCNC: 8 MMOL/L (ref 4–13)
AST SERPL W P-5'-P-CCNC: 16 U/L (ref 13–39)
BASOPHILS # BLD AUTO: 0.06 THOUSANDS/ΜL (ref 0–0.1)
BASOPHILS NFR BLD AUTO: 3 % (ref 0–1)
BILIRUB SERPL-MCNC: 0.36 MG/DL (ref 0.2–1)
BUN SERPL-MCNC: 14 MG/DL (ref 5–25)
CALCIUM SERPL-MCNC: 9 MG/DL (ref 8.4–10.2)
CANCER AG125 SERPL-ACNC: 9.6 U/ML (ref 0–35)
CHLORIDE SERPL-SCNC: 105 MMOL/L (ref 96–108)
CO2 SERPL-SCNC: 29 MMOL/L (ref 21–32)
CREAT SERPL-MCNC: 0.71 MG/DL (ref 0.6–1.3)
EOSINOPHIL # BLD AUTO: 0.09 THOUSAND/ΜL (ref 0–0.61)
EOSINOPHIL NFR BLD AUTO: 4 % (ref 0–6)
ERYTHROCYTE [DISTWIDTH] IN BLOOD BY AUTOMATED COUNT: 16.4 % (ref 11.6–15.1)
GFR SERPL CREATININE-BSD FRML MDRD: 82 ML/MIN/1.73SQ M
GLUCOSE P FAST SERPL-MCNC: 90 MG/DL (ref 65–99)
HCT VFR BLD AUTO: 29.7 % (ref 34.8–46.1)
HGB BLD-MCNC: 9.2 G/DL (ref 11.5–15.4)
IMM GRANULOCYTES # BLD AUTO: 0 THOUSAND/UL (ref 0–0.2)
IMM GRANULOCYTES NFR BLD AUTO: 0 % (ref 0–2)
LIPASE SERPL-CCNC: 65 U/L (ref 11–82)
LYMPHOCYTES # BLD AUTO: 0.8 THOUSANDS/ΜL (ref 0.6–4.47)
LYMPHOCYTES NFR BLD AUTO: 37 % (ref 14–44)
MAGNESIUM SERPL-MCNC: 2.1 MG/DL (ref 1.9–2.7)
MCH RBC QN AUTO: 29.3 PG (ref 26.8–34.3)
MCHC RBC AUTO-ENTMCNC: 31 G/DL (ref 31.4–37.4)
MCV RBC AUTO: 95 FL (ref 82–98)
MONOCYTES # BLD AUTO: 0.1 THOUSAND/ΜL (ref 0.17–1.22)
MONOCYTES NFR BLD AUTO: 5 % (ref 4–12)
NEUTROPHILS # BLD AUTO: 1.14 THOUSANDS/ΜL (ref 1.85–7.62)
NEUTS SEG NFR BLD AUTO: 51 % (ref 43–75)
NRBC BLD AUTO-RTO: 0 /100 WBCS
PLATELET # BLD AUTO: 121 THOUSANDS/UL (ref 149–390)
PMV BLD AUTO: 10.9 FL (ref 8.9–12.7)
POTASSIUM SERPL-SCNC: 3.9 MMOL/L (ref 3.5–5.3)
PROT SERPL-MCNC: 6.1 G/DL (ref 6.4–8.4)
RBC # BLD AUTO: 3.14 MILLION/UL (ref 3.81–5.12)
SODIUM SERPL-SCNC: 142 MMOL/L (ref 135–147)
T4 FREE SERPL-MCNC: 1.1 NG/DL (ref 0.61–1.12)
TSH SERPL DL<=0.05 MIU/L-ACNC: <0.01 UIU/ML (ref 0.45–4.5)
WBC # BLD AUTO: 2.19 THOUSAND/UL (ref 4.31–10.16)

## 2024-09-12 PROCEDURE — 83735 ASSAY OF MAGNESIUM: CPT

## 2024-09-12 PROCEDURE — 86304 IMMUNOASSAY TUMOR CA 125: CPT

## 2024-09-12 PROCEDURE — 84439 ASSAY OF FREE THYROXINE: CPT

## 2024-09-12 PROCEDURE — 82150 ASSAY OF AMYLASE: CPT

## 2024-09-12 PROCEDURE — 83690 ASSAY OF LIPASE: CPT

## 2024-09-12 PROCEDURE — 84443 ASSAY THYROID STIM HORMONE: CPT

## 2024-09-12 PROCEDURE — 85025 COMPLETE CBC W/AUTO DIFF WBC: CPT

## 2024-09-12 PROCEDURE — 80053 COMPREHEN METABOLIC PANEL: CPT

## 2024-09-12 PROCEDURE — 36415 COLL VENOUS BLD VENIPUNCTURE: CPT

## 2024-09-13 ENCOUNTER — TELEPHONE (OUTPATIENT)
Age: 74
End: 2024-09-13

## 2024-09-13 ENCOUNTER — TELEPHONE (OUTPATIENT)
Dept: GYNECOLOGIC ONCOLOGY | Facility: CLINIC | Age: 74
End: 2024-09-13

## 2024-09-13 NOTE — TELEPHONE ENCOUNTER
Called and spoke to America at the Endocrinology office to get patient  a sooner appointment. America informed me that she will send a message to the office staff and they will be in contact with the patient with the sooner appointment.

## 2024-09-13 NOTE — TELEPHONE ENCOUNTER
It looks like the patient was scheduled with Dr. Saunders for a consult which is incorrect. She is an established patient and was seen by Dr. Rob in 2023. I called the patient and she only wants to come to the [place] office. I was able to reschedule her with Efrem for 10/7 as requested by the patient. I offered sooner appointments which she declined due to her chemo schedule

## 2024-09-16 ENCOUNTER — TELEPHONE (OUTPATIENT)
Age: 74
End: 2024-09-16

## 2024-09-16 NOTE — TELEPHONE ENCOUNTER
"Patient calling    HIPAA verified.    Seeing Dr Rouse.    Asking \"why all the thyroid tests?\"    Please call back after 1pm on [phone]   "

## 2024-09-19 ENCOUNTER — OFFICE VISIT (OUTPATIENT)
Age: 74
End: 2024-09-19
Payer: MEDICARE

## 2024-09-19 ENCOUNTER — APPOINTMENT (OUTPATIENT)
Dept: LAB | Facility: HOSPITAL | Age: 74
End: 2024-09-19
Payer: MEDICARE

## 2024-09-19 VITALS
HEART RATE: 86 BPM | WEIGHT: 122 LBS | SYSTOLIC BLOOD PRESSURE: 110 MMHG | TEMPERATURE: 98 F | BODY MASS INDEX: 20.83 KG/M2 | HEIGHT: 64 IN | RESPIRATION RATE: 18 BRPM | DIASTOLIC BLOOD PRESSURE: 78 MMHG | OXYGEN SATURATION: 100 %

## 2024-09-19 DIAGNOSIS — C54.1 ENDOMETRIAL CANCER (HCC): ICD-10-CM

## 2024-09-19 DIAGNOSIS — C54.1 ENDOMETRIAL CANCER (HCC): Primary | ICD-10-CM

## 2024-09-19 DIAGNOSIS — E05.90 HYPERTHYROIDISM: ICD-10-CM

## 2024-09-19 LAB
ALBUMIN SERPL BCG-MCNC: 3.9 G/DL (ref 3.5–5)
ALP SERPL-CCNC: 75 U/L (ref 34–104)
ALT SERPL W P-5'-P-CCNC: 11 U/L (ref 7–52)
ANION GAP SERPL CALCULATED.3IONS-SCNC: 7 MMOL/L (ref 4–13)
AST SERPL W P-5'-P-CCNC: 16 U/L (ref 13–39)
BASOPHILS # BLD AUTO: 0.03 THOUSANDS/ΜL (ref 0–0.1)
BASOPHILS NFR BLD AUTO: 1 % (ref 0–1)
BILIRUB SERPL-MCNC: 0.3 MG/DL (ref 0.2–1)
BUN SERPL-MCNC: 11 MG/DL (ref 5–25)
CALCIUM SERPL-MCNC: 9.3 MG/DL (ref 8.4–10.2)
CHLORIDE SERPL-SCNC: 105 MMOL/L (ref 96–108)
CO2 SERPL-SCNC: 29 MMOL/L (ref 21–32)
CREAT SERPL-MCNC: 0.71 MG/DL (ref 0.6–1.3)
EOSINOPHIL # BLD AUTO: 0.05 THOUSAND/ΜL (ref 0–0.61)
EOSINOPHIL NFR BLD AUTO: 2 % (ref 0–6)
ERYTHROCYTE [DISTWIDTH] IN BLOOD BY AUTOMATED COUNT: 18.2 % (ref 11.6–15.1)
GFR SERPL CREATININE-BSD FRML MDRD: 82 ML/MIN/1.73SQ M
GLUCOSE SERPL-MCNC: 114 MG/DL (ref 65–140)
HCT VFR BLD AUTO: 32.9 % (ref 34.8–46.1)
HGB BLD-MCNC: 10.4 G/DL (ref 11.5–15.4)
IMM GRANULOCYTES # BLD AUTO: 0.01 THOUSAND/UL (ref 0–0.2)
IMM GRANULOCYTES NFR BLD AUTO: 0 % (ref 0–2)
LYMPHOCYTES # BLD AUTO: 1.06 THOUSANDS/ΜL (ref 0.6–4.47)
LYMPHOCYTES NFR BLD AUTO: 43 % (ref 14–44)
MAGNESIUM SERPL-MCNC: 2.1 MG/DL (ref 1.9–2.7)
MCH RBC QN AUTO: 30.1 PG (ref 26.8–34.3)
MCHC RBC AUTO-ENTMCNC: 31.6 G/DL (ref 31.4–37.4)
MCV RBC AUTO: 95 FL (ref 82–98)
MONOCYTES # BLD AUTO: 0.26 THOUSAND/ΜL (ref 0.17–1.22)
MONOCYTES NFR BLD AUTO: 11 % (ref 4–12)
NEUTROPHILS # BLD AUTO: 1.04 THOUSANDS/ΜL (ref 1.85–7.62)
NEUTS SEG NFR BLD AUTO: 43 % (ref 43–75)
NRBC BLD AUTO-RTO: 0 /100 WBCS
PLATELET # BLD AUTO: 109 THOUSANDS/UL (ref 149–390)
PMV BLD AUTO: 10.5 FL (ref 8.9–12.7)
POTASSIUM SERPL-SCNC: 4.8 MMOL/L (ref 3.5–5.3)
PROT SERPL-MCNC: 6.5 G/DL (ref 6.4–8.4)
RBC # BLD AUTO: 3.45 MILLION/UL (ref 3.81–5.12)
SODIUM SERPL-SCNC: 141 MMOL/L (ref 135–147)
WBC # BLD AUTO: 2.45 THOUSAND/UL (ref 4.31–10.16)

## 2024-09-19 PROCEDURE — 83735 ASSAY OF MAGNESIUM: CPT

## 2024-09-19 PROCEDURE — 80053 COMPREHEN METABOLIC PANEL: CPT

## 2024-09-19 PROCEDURE — 99215 OFFICE O/P EST HI 40 MIN: CPT | Performed by: PHYSICIAN ASSISTANT

## 2024-09-19 PROCEDURE — 85025 COMPLETE CBC W/AUTO DIFF WBC: CPT

## 2024-09-19 PROCEDURE — G2211 COMPLEX E/M VISIT ADD ON: HCPCS | Performed by: PHYSICIAN ASSISTANT

## 2024-09-19 PROCEDURE — 36415 COLL VENOUS BLD VENIPUNCTURE: CPT

## 2024-09-19 NOTE — ASSESSMENT & PLAN NOTE
Likely stage IIIC2 high grade endometrial cancer currently receiving neoadjuvant chemotherapy with taxol 135 mg/m2, carboplatin AUC 5 and dostarlimab 500 mg IV every 21 days. Overall, she is tolerating treatment well. She has grade 1 treatment-related fatigue and pre-existing hyperthyroidism. Her  has normalized.    Proceed with cycle 3 of treatment as planned as long as her metabolic and hematologic parameters are adequate.     Return to the office per her chemotherapy calendar. Plan for repeat imaging after completion of cycle 3.

## 2024-09-19 NOTE — PROGRESS NOTES
Assessment/Plan:    Problem List Items Addressed This Visit          Endocrine    Hyperthyroidism     Pre-existing prior to starting immunotherapy.   Endocrinology consult pending.   Asymptomatic.             Genitourinary    Endometrial cancer (HCC) - Primary     Likely stage IIIC2 high grade endometrial cancer currently receiving neoadjuvant chemotherapy with taxol 135 mg/m2, carboplatin AUC 5 and dostarlimab 500 mg IV every 21 days. Overall, she is tolerating treatment well. She has grade 1 treatment-related fatigue and pre-existing hyperthyroidism. Her  has normalized.    Proceed with cycle 3 of treatment as planned as long as her metabolic and hematologic parameters are adequate.     Return to the office per her chemotherapy calendar. Plan for repeat imaging after completion of cycle 3.          Relevant Orders    CT chest abdomen pelvis w contrast         CHIEF COMPLAINT:   Pre-chemotherapy evaluation    Problem:  Cancer Staging   Endometrial cancer (HCC)  Staging form: Corpus Uteri - Carcinoma, AJCC 8th Edition  - Clinical: FIGO Stage IIIC2 - Signed by Mansoor Alonso MD on 8/29/2024  - Pathologic: No stage assigned - Signed by Mansoor Alonso MD on 8/29/2024        Previous therapy:  Oncology History   Endometrial cancer (HCC)   7/18/2024 Initial Diagnosis    Endometrial cancer (HCC)     8/13/2024 -  Chemotherapy    alteplase (CATHFLO), 2 mg, Intracatheter, Every 1 Minute as needed, 2 of 12 cycles  palonosetron (ALOXI), 0.25 mg, Intravenous, Once, 2 of 6 cycles  Administration: 0.25 mg (8/13/2024), 0.25 mg (9/3/2024)  fosaprepitant (EMEND) IVPB, 150 mg, Intravenous, Once, 2 of 6 cycles  Administration: 150 mg (8/13/2024), 150 mg (9/3/2024)  dostarlimab-gxly (JEMPERLI) IVPB, 500 mg, Intravenous, Once, 2 of 12 cycles  Administration: 500 mg (8/13/2024), 500 mg (9/3/2024)  CARBOplatin (PARAPLATIN) IVPB (GOG AUC DOSING), 389 mg, Intravenous, Once, 2 of 6 cycles  Administration: 389 mg  (8/13/2024), 392.5 mg (9/3/2024)  PACLItaxel (TAXOL) chemo IVPB, 135 mg/m2 = 211.8 mg (77.1 % of original dose 175 mg/m2), Intravenous, Once, 2 of 6 cycles  Dose modification: 135 mg/m2 (original dose 175 mg/m2, Cycle 1, Reason: Other (Must fill in a comment), Comment: prescribed starting dose)  Administration: 211.8 mg (8/13/2024), 211.8 mg (9/3/2024)     8/29/2024 -  Cancer Staged    clinical stage IIIC2 disease (retroperitoneal and aortocaval lymphadenopathy) vs IVB (possible bowel mesentery disease) based on imaging  Staging form: Corpus Uteri - Carcinoma, AJCC 8th Edition  - Clinical: FIGO Stage IIIC2 - Signed by Mansoor Alonso MD on 8/29/2024  Histopathologic type: Endometrioid adenocarcinoma, NOS  Stage prefix: Initial diagnosis  Histologic grade (G): G3  Histologic grading system: 3 grade system  Specimen type: Biopsy / Limited Resection             Patient ID: Nallely Peacock is a 74 y.o. female  who presents to the office for pre-chemotherapy evaluation. Overall, she is tolerating treatment well with minimal toxicity. She has been afebrile. She notes some fatigue but no n/v/abdominal pain. Normal bowel/bladder function. Denies diarrhea or blood in her stool. She is without chemotherapy-induced neuropathy. Denies skin rash, cough/SOB. CBC/Diff, CMP, Mg from 9/12/24 reviewed.        The following portions of the patient's history were reviewed and updated as appropriate: allergies, current medications, past medical history, past surgical history, and problem list.    Review of Systems   Constitutional:  Positive for fatigue. Negative for fever.   HENT: Negative.     Eyes: Negative.    Respiratory: Negative.     Cardiovascular: Negative.    Gastrointestinal: Negative.    Genitourinary: Negative.    Musculoskeletal: Negative.    Skin: Negative.    Neurological: Negative.    Psychiatric/Behavioral: Negative.         Current Outpatient Medications   Medication Sig Dispense Refill    amLODIPine (NORVASC) 10  "mg tablet Take 1 tablet (10 mg total) by mouth daily 90 tablet 3    calcium carbonate (Calcium 600) 600 MG tablet Take 600 mg by mouth 2 (two) times a day with meals      LORazepam (ATIVAN) 1 mg tablet Take 1 tablet (1 mg total) by mouth every 8 (eight) hours as needed (nausea or anxiety) 20 tablet 0    Multiple Vitamin (MULTIVITAMIN ADULT PO) Take 1 tablet by mouth daily      ondansetron (ZOFRAN) 8 mg tablet Take 1 tablet (8 mg total) by mouth every 8 (eight) hours as needed for nausea or vomiting 30 tablet 1    simvastatin (ZOCOR) 10 mg tablet Take 1 tablet (10 mg total) by mouth daily at bedtime 90 tablet 3    acetaminophen (TYLENOL) 160 MG chewable tablet Chew 160 mg every 6 (six) hours as needed for mild pain (Patient not taking: Reported on 9/19/2024)       No current facility-administered medications for this visit.           Objective:    Blood pressure 110/78, pulse 86, temperature 98 °F (36.7 °C), resp. rate 18, height 5' 4\" (1.626 m), weight 55.3 kg (122 lb), SpO2 100%.  Body mass index is 20.94 kg/m².  Body surface area is 1.58 meters squared.    Physical Exam  Constitutional:       Appearance: She is well-developed.   Pulmonary:      Effort: Pulmonary effort is normal.   Skin:     General: Skin is warm and dry.      Findings: No rash.   Neurological:      Mental Status: She is alert and oriented to person, place, and time.   Psychiatric:         Behavior: Behavior normal.         Thought Content: Thought content normal.         Judgment: Judgment normal.     Performance status is zero.      Lab Results   Component Value Date    K 3.9 09/12/2024     09/12/2024    CO2 29 09/12/2024    BUN 14 09/12/2024    CREATININE 0.71 09/12/2024    GLUF 90 09/12/2024    CALCIUM 9.0 09/12/2024    CORRECTEDCA 10.2 (H) 08/08/2024    AST 16 09/12/2024    ALT 12 09/12/2024    ALKPHOS 78 09/12/2024    EGFR 82 09/12/2024     Lab Results   Component Value Date    WBC 2.19 (L) 09/12/2024    HGB 9.2 (L) 09/12/2024    HCT " 29.7 (L) 09/12/2024    MCV 95 09/12/2024     (L) 09/12/2024     Lab Results   Component Value Date    NEUTROABS 1.14 (L) 09/12/2024        Trend:  Lab Results   Component Value Date     9.6 09/12/2024     33.7 08/29/2024     61.1 (H) 08/22/2024     49.8 (H) 08/08/2024

## 2024-09-23 DIAGNOSIS — C54.1 ENDOMETRIAL CANCER (HCC): Primary | ICD-10-CM

## 2024-09-23 RX ORDER — PALONOSETRON 0.05 MG/ML
0.25 INJECTION, SOLUTION INTRAVENOUS ONCE
Status: CANCELLED | OUTPATIENT
Start: 2024-09-24

## 2024-09-23 RX ORDER — SODIUM CHLORIDE 9 MG/ML
20 INJECTION, SOLUTION INTRAVENOUS ONCE
Status: CANCELLED | OUTPATIENT
Start: 2024-09-24

## 2024-09-24 ENCOUNTER — TELEPHONE (OUTPATIENT)
Dept: GYNECOLOGIC ONCOLOGY | Facility: CLINIC | Age: 74
End: 2024-09-24

## 2024-09-24 ENCOUNTER — HOSPITAL ENCOUNTER (OUTPATIENT)
Dept: INFUSION CENTER | Facility: HOSPITAL | Age: 74
Discharge: HOME | End: 2024-09-24
Attending: OBSTETRICS & GYNECOLOGY
Payer: MEDICARE

## 2024-09-24 VITALS
SYSTOLIC BLOOD PRESSURE: 120 MMHG | OXYGEN SATURATION: 100 % | TEMPERATURE: 97.4 F | DIASTOLIC BLOOD PRESSURE: 68 MMHG | HEART RATE: 72 BPM | RESPIRATION RATE: 18 BRPM

## 2024-09-24 DIAGNOSIS — C54.1 ENDOMETRIAL CANCER (HCC): Primary | ICD-10-CM

## 2024-09-24 LAB
BASOPHILS # BLD AUTO: 0.03 THOUSANDS/ΜL (ref 0–0.1)
BASOPHILS NFR BLD AUTO: 1 % (ref 0–1)
EOSINOPHIL # BLD AUTO: 0.05 THOUSAND/ΜL (ref 0–0.61)
EOSINOPHIL NFR BLD AUTO: 2 % (ref 0–6)
ERYTHROCYTE [DISTWIDTH] IN BLOOD BY AUTOMATED COUNT: 18.9 % (ref 11.6–15.1)
HCT VFR BLD AUTO: 32.2 % (ref 34.8–46.1)
HGB BLD-MCNC: 9.9 G/DL (ref 11.5–15.4)
IMM GRANULOCYTES # BLD AUTO: 0.01 THOUSAND/UL (ref 0–0.2)
IMM GRANULOCYTES NFR BLD AUTO: 0 % (ref 0–2)
LYMPHOCYTES # BLD AUTO: 0.87 THOUSANDS/ΜL (ref 0.6–4.47)
LYMPHOCYTES NFR BLD AUTO: 29 % (ref 14–44)
MCH RBC QN AUTO: 29.7 PG (ref 26.8–34.3)
MCHC RBC AUTO-ENTMCNC: 30.7 G/DL (ref 31.4–37.4)
MCV RBC AUTO: 97 FL (ref 82–98)
MONOCYTES # BLD AUTO: 0.3 THOUSAND/ΜL (ref 0.17–1.22)
MONOCYTES NFR BLD AUTO: 10 % (ref 4–12)
NEUTROPHILS # BLD AUTO: 1.72 THOUSANDS/ΜL (ref 1.85–7.62)
NEUTS SEG NFR BLD AUTO: 58 % (ref 43–75)
NRBC BLD AUTO-RTO: 0 /100 WBCS
PLATELET # BLD AUTO: 81 THOUSANDS/UL (ref 149–390)
PMV BLD AUTO: 8.9 FL (ref 8.9–12.7)
RBC # BLD AUTO: 3.33 MILLION/UL (ref 3.81–5.12)
WBC # BLD AUTO: 2.98 THOUSAND/UL (ref 4.31–10.16)

## 2024-09-24 PROCEDURE — 96365 THER/PROPH/DIAG IV INF INIT: CPT

## 2024-09-24 PROCEDURE — 96375 TX/PRO/DX INJ NEW DRUG ADDON: CPT

## 2024-09-24 PROCEDURE — 85025 COMPLETE CBC W/AUTO DIFF WBC: CPT | Performed by: STUDENT IN AN ORGANIZED HEALTH CARE EDUCATION/TRAINING PROGRAM

## 2024-09-24 RX ORDER — SODIUM CHLORIDE 9 MG/ML
20 INJECTION, SOLUTION INTRAVENOUS ONCE
Status: COMPLETED | OUTPATIENT
Start: 2024-09-24 | End: 2024-09-25

## 2024-09-24 RX ORDER — PALONOSETRON 0.05 MG/ML
0.25 INJECTION, SOLUTION INTRAVENOUS ONCE
OUTPATIENT
Start: 2024-10-02

## 2024-09-24 RX ORDER — PALONOSETRON 0.05 MG/ML
0.25 INJECTION, SOLUTION INTRAVENOUS ONCE
Status: DISCONTINUED | OUTPATIENT
Start: 2024-09-24 | End: 2024-09-24

## 2024-09-24 RX ORDER — SODIUM CHLORIDE 9 MG/ML
20 INJECTION, SOLUTION INTRAVENOUS ONCE
OUTPATIENT
Start: 2024-10-02

## 2024-09-24 RX ADMIN — DEXAMETHASONE SODIUM PHOSPHATE 20 MG: 10 INJECTION, SOLUTION INTRAMUSCULAR; INTRAVENOUS at 08:24

## 2024-09-24 RX ADMIN — SODIUM CHLORIDE 20 ML/HR: 9 INJECTION, SOLUTION INTRAVENOUS at 08:24

## 2024-09-24 RX ADMIN — FAMOTIDINE 20 MG: 10 INJECTION INTRAVENOUS at 08:33

## 2024-09-24 NOTE — TELEPHONE ENCOUNTER
Called and spoke with Patient that CT is delayed to 10/11 at 1 pm in .  Patient agreed to the time, date, instructions, and location.    Patient is unsure why her treatment was delayed.  They did say that her platelets were low, but is still unsure.  Patient also asked if she can get an updated calendar set to her.  Told her that I message Rosario and we will work on getting her the calendar.

## 2024-09-24 NOTE — PROGRESS NOTES
Due to Nallely's platelets resulting in 81, Per Rosario Synnamon REGINA, we are holding treatment for 1 week.     Nallely Peacock is aware of future appt on 10/2 at 0730.     AVS printed and given to Nallely Peacock:    No (Declined by Nallely Peacock)

## 2024-09-29 PROCEDURE — 93000 ELECTROCARDIOGRAM COMPLETE: CPT | Performed by: FAMILY MEDICINE

## 2024-10-01 ENCOUNTER — APPOINTMENT (OUTPATIENT)
Dept: LAB | Facility: HOSPITAL | Age: 74
End: 2024-10-01
Payer: MEDICARE

## 2024-10-01 DIAGNOSIS — R53.81 OTHER MALAISE: ICD-10-CM

## 2024-10-01 DIAGNOSIS — C54.1 ENDOMETRIAL CANCER (HCC): ICD-10-CM

## 2024-10-01 LAB
ALBUMIN SERPL BCG-MCNC: 3.8 G/DL (ref 3.5–5)
ALP SERPL-CCNC: 72 U/L (ref 34–104)
ALT SERPL W P-5'-P-CCNC: 10 U/L (ref 7–52)
AMYLASE SERPL-CCNC: 52 IU/L (ref 29–103)
ANION GAP SERPL CALCULATED.3IONS-SCNC: 9 MMOL/L (ref 4–13)
AST SERPL W P-5'-P-CCNC: 15 U/L (ref 13–39)
BASOPHILS # BLD AUTO: 0.05 THOUSANDS/ÂΜL (ref 0–0.1)
BASOPHILS NFR BLD AUTO: 2 % (ref 0–1)
BILIRUB SERPL-MCNC: 0.37 MG/DL (ref 0.2–1)
BUN SERPL-MCNC: 12 MG/DL (ref 5–25)
CALCIUM SERPL-MCNC: 9.3 MG/DL (ref 8.4–10.2)
CANCER AG125 SERPL-ACNC: 6.4 U/ML (ref 0–35)
CHLORIDE SERPL-SCNC: 106 MMOL/L (ref 96–108)
CO2 SERPL-SCNC: 28 MMOL/L (ref 21–32)
CREAT SERPL-MCNC: 0.78 MG/DL (ref 0.6–1.3)
EOSINOPHIL # BLD AUTO: 0.09 THOUSAND/ÂΜL (ref 0–0.61)
EOSINOPHIL NFR BLD AUTO: 4 % (ref 0–6)
ERYTHROCYTE [DISTWIDTH] IN BLOOD BY AUTOMATED COUNT: 19 % (ref 11.6–15.1)
GFR SERPL CREATININE-BSD FRML MDRD: 74 ML/MIN/1.73SQ M
GLUCOSE P FAST SERPL-MCNC: 80 MG/DL (ref 65–99)
HCT VFR BLD AUTO: 34.3 % (ref 34.8–46.1)
HGB BLD-MCNC: 10.6 G/DL (ref 11.5–15.4)
IMM GRANULOCYTES # BLD AUTO: 0.01 THOUSAND/UL (ref 0–0.2)
IMM GRANULOCYTES NFR BLD AUTO: 0 % (ref 0–2)
LIPASE SERPL-CCNC: 40 U/L (ref 11–82)
LYMPHOCYTES # BLD AUTO: 0.75 THOUSANDS/ÂΜL (ref 0.6–4.47)
LYMPHOCYTES NFR BLD AUTO: 29 % (ref 14–44)
MAGNESIUM SERPL-MCNC: 2.1 MG/DL (ref 1.9–2.7)
MCH RBC QN AUTO: 30.5 PG (ref 26.8–34.3)
MCHC RBC AUTO-ENTMCNC: 30.9 G/DL (ref 31.4–37.4)
MCV RBC AUTO: 99 FL (ref 82–98)
MONOCYTES # BLD AUTO: 0.19 THOUSAND/ÂΜL (ref 0.17–1.22)
MONOCYTES NFR BLD AUTO: 7 % (ref 4–12)
NEUTROPHILS # BLD AUTO: 1.5 THOUSANDS/ÂΜL (ref 1.85–7.62)
NEUTS SEG NFR BLD AUTO: 58 % (ref 43–75)
NRBC BLD AUTO-RTO: 0 /100 WBCS
PLATELET # BLD AUTO: 162 THOUSANDS/UL (ref 149–390)
PMV BLD AUTO: 10 FL (ref 8.9–12.7)
POTASSIUM SERPL-SCNC: 3.9 MMOL/L (ref 3.5–5.3)
PROT SERPL-MCNC: 6.6 G/DL (ref 6.4–8.4)
RBC # BLD AUTO: 3.48 MILLION/UL (ref 3.81–5.12)
SODIUM SERPL-SCNC: 143 MMOL/L (ref 135–147)
T4 FREE SERPL-MCNC: 1.11 NG/DL (ref 0.61–1.12)
TSH SERPL DL<=0.05 MIU/L-ACNC: <0.01 UIU/ML (ref 0.45–4.5)
WBC # BLD AUTO: 2.59 THOUSAND/UL (ref 4.31–10.16)

## 2024-10-01 PROCEDURE — 36415 COLL VENOUS BLD VENIPUNCTURE: CPT

## 2024-10-01 PROCEDURE — 82150 ASSAY OF AMYLASE: CPT

## 2024-10-01 PROCEDURE — 80053 COMPREHEN METABOLIC PANEL: CPT

## 2024-10-01 PROCEDURE — 86304 IMMUNOASSAY TUMOR CA 125: CPT

## 2024-10-01 PROCEDURE — 84443 ASSAY THYROID STIM HORMONE: CPT

## 2024-10-01 PROCEDURE — 83690 ASSAY OF LIPASE: CPT

## 2024-10-01 PROCEDURE — 85025 COMPLETE CBC W/AUTO DIFF WBC: CPT

## 2024-10-01 PROCEDURE — 83735 ASSAY OF MAGNESIUM: CPT

## 2024-10-01 PROCEDURE — 84439 ASSAY OF FREE THYROXINE: CPT

## 2024-10-02 ENCOUNTER — HOSPITAL ENCOUNTER (OUTPATIENT)
Dept: INFUSION CENTER | Facility: HOSPITAL | Age: 74
Discharge: HOME | End: 2024-10-02
Attending: OBSTETRICS & GYNECOLOGY
Payer: MEDICARE

## 2024-10-02 VITALS
BODY MASS INDEX: 21.08 KG/M2 | SYSTOLIC BLOOD PRESSURE: 97 MMHG | HEART RATE: 93 BPM | HEIGHT: 64 IN | OXYGEN SATURATION: 100 % | WEIGHT: 123.46 LBS | TEMPERATURE: 97.3 F | RESPIRATION RATE: 18 BRPM | DIASTOLIC BLOOD PRESSURE: 65 MMHG

## 2024-10-02 DIAGNOSIS — C54.1 ENDOMETRIAL CANCER (HCC): Primary | ICD-10-CM

## 2024-10-02 PROCEDURE — 96417 CHEMO IV INFUS EACH ADDL SEQ: CPT

## 2024-10-02 PROCEDURE — 96413 CHEMO IV INFUSION 1 HR: CPT

## 2024-10-02 PROCEDURE — 96415 CHEMO IV INFUSION ADDL HR: CPT

## 2024-10-02 PROCEDURE — 96375 TX/PRO/DX INJ NEW DRUG ADDON: CPT

## 2024-10-02 PROCEDURE — 96367 TX/PROPH/DG ADDL SEQ IV INF: CPT

## 2024-10-02 RX ORDER — SODIUM CHLORIDE 9 MG/ML
20 INJECTION, SOLUTION INTRAVENOUS ONCE
Status: COMPLETED | OUTPATIENT
Start: 2024-10-02 | End: 2024-10-02

## 2024-10-02 RX ORDER — PALONOSETRON 0.05 MG/ML
0.25 INJECTION, SOLUTION INTRAVENOUS ONCE
Status: COMPLETED | OUTPATIENT
Start: 2024-10-02 | End: 2024-10-02

## 2024-10-02 RX ADMIN — FAMOTIDINE 20 MG: 10 INJECTION INTRAVENOUS at 09:03

## 2024-10-02 RX ADMIN — SODIUM CHLORIDE 20 ML/HR: 0.9 INJECTION, SOLUTION INTRAVENOUS at 08:17

## 2024-10-02 RX ADMIN — CARBOPLATIN 308.4 MG: 10 INJECTION, SOLUTION INTRAVENOUS at 14:54

## 2024-10-02 RX ADMIN — FOSAPREPITANT 150 MG: 150 INJECTION, POWDER, LYOPHILIZED, FOR SOLUTION INTRAVENOUS at 09:24

## 2024-10-02 RX ADMIN — PALONOSETRON HYDROCHLORIDE 0.25 MG: 0.25 INJECTION INTRAVENOUS at 08:19

## 2024-10-02 RX ADMIN — DEXAMETHASONE SODIUM PHOSPHATE 20 MG: 10 INJECTION, SOLUTION INTRAMUSCULAR; INTRAVENOUS at 08:17

## 2024-10-02 RX ADMIN — SODIUM CHLORIDE 500 MG: 9 INJECTION, SOLUTION INTRAVENOUS at 10:00

## 2024-10-02 RX ADMIN — DIPHENHYDRAMINE HYDROCHLORIDE 25 MG: 50 INJECTION, SOLUTION INTRAMUSCULAR; INTRAVENOUS at 08:40

## 2024-10-02 RX ADMIN — PACLITAXEL 211.8 MG: 6 INJECTION, SOLUTION INTRAVENOUS at 11:56

## 2024-10-02 NOTE — PROGRESS NOTES
CBC from 10/1/24 reviewed. Ok for treatment on 10/2/24. Plan to dose-reduce carboplatin to AUC 4 given prior neutropenia and thrombocytopenia.

## 2024-10-02 NOTE — ADDENDUM NOTE
Encounter addended by: Hilton Lorenzo, Pharmacist on: 10/2/2024 8:41 AM   Actions taken: i-Vent created or edited

## 2024-10-02 NOTE — PROGRESS NOTES
Nallely Peacock  tolerated treatment well with no complications.      Nallely Peacock is aware of future appt on 10/23 at 0730.     AVS printed and given to Nallely Peacock:  Yes

## 2024-10-03 ENCOUNTER — TELEPHONE (OUTPATIENT)
Age: 74
End: 2024-10-03

## 2024-10-03 NOTE — TELEPHONE ENCOUNTER
Call placed and spoke with Nallely.  I explained that she is scheduled for a CT on 10/11 and then she will see Dr Jacome on 10/17 and will review her POC.  She may possibly be scheduled for a surgery if her CT results show a good response to treatment.  Instructed to continue weekly labs until seen by Dr Jacome.

## 2024-10-03 NOTE — TELEPHONE ENCOUNTER
SHONI:  Pt called a little confused on the wording on her calendar for the infusion place date time and what she is receiving that day-10/23. Discussed with pt the location of her treatment the time and the medications that will be given. Pt understood. Pt was very anxious and confused.Support was provided to the pt.Pt appreciative of the information given and time spent talking to her.

## 2024-10-07 ENCOUNTER — OFFICE VISIT (OUTPATIENT)
Dept: ENDOCRINOLOGY | Facility: HOSPITAL | Age: 74
End: 2024-10-07
Payer: MEDICARE

## 2024-10-07 ENCOUNTER — TELEPHONE (OUTPATIENT)
Age: 74
End: 2024-10-07

## 2024-10-07 VITALS
HEIGHT: 64 IN | DIASTOLIC BLOOD PRESSURE: 70 MMHG | WEIGHT: 124.8 LBS | HEART RATE: 107 BPM | BODY MASS INDEX: 21.31 KG/M2 | SYSTOLIC BLOOD PRESSURE: 110 MMHG | OXYGEN SATURATION: 99 %

## 2024-10-07 DIAGNOSIS — E05.90 HYPERTHYROIDISM: Primary | ICD-10-CM

## 2024-10-07 PROCEDURE — 99214 OFFICE O/P EST MOD 30 MIN: CPT | Performed by: PHYSICIAN ASSISTANT

## 2024-10-07 RX ORDER — METHIMAZOLE 5 MG/1
5 TABLET ORAL 3 TIMES DAILY
Qty: 90 TABLET | Refills: 1 | Status: SHIPPED | OUTPATIENT
Start: 2024-10-07

## 2024-10-07 NOTE — PATIENT INSTRUCTIONS
Start methimazole 5 mg daily.    Get lab work in about 6 weeks.    Recommend getting ultrasound completed.    Call with any concerns or questions.    Follow up in 3 months.

## 2024-10-07 NOTE — TELEPHONE ENCOUNTER
Received call from patient requesting to clarify lab work schedule. States she was told in the past to have her blood work drawn on a Tuesday weekly, and was changed once to have blood work drawn on a Thursday. Patient would like to confirm that she is to continue having lab work drawn weekly on Tuesdays.
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
## 2024-10-08 ENCOUNTER — APPOINTMENT (OUTPATIENT)
Dept: LAB | Facility: HOSPITAL | Age: 74
End: 2024-10-08
Payer: MEDICARE

## 2024-10-08 DIAGNOSIS — R53.81 OTHER MALAISE: ICD-10-CM

## 2024-10-08 DIAGNOSIS — C54.1 ENDOMETRIAL CANCER (HCC): ICD-10-CM

## 2024-10-08 LAB
ALBUMIN SERPL BCG-MCNC: 4 G/DL (ref 3.5–5)
ALP SERPL-CCNC: 71 U/L (ref 34–104)
ALT SERPL W P-5'-P-CCNC: 11 U/L (ref 7–52)
AMYLASE SERPL-CCNC: 53 IU/L (ref 29–103)
ANION GAP SERPL CALCULATED.3IONS-SCNC: 7 MMOL/L (ref 4–13)
AST SERPL W P-5'-P-CCNC: 14 U/L (ref 13–39)
BASOPHILS # BLD AUTO: 0.02 THOUSANDS/ΜL (ref 0–0.1)
BASOPHILS NFR BLD AUTO: 1 % (ref 0–1)
BILIRUB SERPL-MCNC: 0.49 MG/DL (ref 0.2–1)
BUN SERPL-MCNC: 18 MG/DL (ref 5–25)
CALCIUM SERPL-MCNC: 9.4 MG/DL (ref 8.4–10.2)
CANCER AG125 SERPL-ACNC: 6.9 U/ML (ref 0–35)
CHLORIDE SERPL-SCNC: 106 MMOL/L (ref 96–108)
CO2 SERPL-SCNC: 29 MMOL/L (ref 21–32)
CREAT SERPL-MCNC: 0.78 MG/DL (ref 0.6–1.3)
EOSINOPHIL # BLD AUTO: 0.07 THOUSAND/ΜL (ref 0–0.61)
EOSINOPHIL NFR BLD AUTO: 4 % (ref 0–6)
ERYTHROCYTE [DISTWIDTH] IN BLOOD BY AUTOMATED COUNT: 17.9 % (ref 11.6–15.1)
GFR SERPL CREATININE-BSD FRML MDRD: 74 ML/MIN/1.73SQ M
GLUCOSE SERPL-MCNC: 83 MG/DL (ref 65–140)
HCT VFR BLD AUTO: 33.4 % (ref 34.8–46.1)
HGB BLD-MCNC: 10.5 G/DL (ref 11.5–15.4)
IMM GRANULOCYTES # BLD AUTO: 0.01 THOUSAND/UL (ref 0–0.2)
IMM GRANULOCYTES NFR BLD AUTO: 1 % (ref 0–2)
LIPASE SERPL-CCNC: 43 U/L (ref 11–82)
LYMPHOCYTES # BLD AUTO: 0.51 THOUSANDS/ΜL (ref 0.6–4.47)
LYMPHOCYTES NFR BLD AUTO: 28 % (ref 14–44)
MAGNESIUM SERPL-MCNC: 2 MG/DL (ref 1.9–2.7)
MCH RBC QN AUTO: 31.1 PG (ref 26.8–34.3)
MCHC RBC AUTO-ENTMCNC: 31.4 G/DL (ref 31.4–37.4)
MCV RBC AUTO: 99 FL (ref 82–98)
MONOCYTES # BLD AUTO: 0.05 THOUSAND/ΜL (ref 0.17–1.22)
MONOCYTES NFR BLD AUTO: 3 % (ref 4–12)
NEUTROPHILS # BLD AUTO: 1.15 THOUSANDS/ΜL (ref 1.85–7.62)
NEUTS SEG NFR BLD AUTO: 63 % (ref 43–75)
NRBC BLD AUTO-RTO: 0 /100 WBCS
PLATELET # BLD AUTO: 170 THOUSANDS/UL (ref 149–390)
PMV BLD AUTO: 10.2 FL (ref 8.9–12.7)
POTASSIUM SERPL-SCNC: 4.8 MMOL/L (ref 3.5–5.3)
PROT SERPL-MCNC: 6.6 G/DL (ref 6.4–8.4)
RBC # BLD AUTO: 3.38 MILLION/UL (ref 3.81–5.12)
SODIUM SERPL-SCNC: 142 MMOL/L (ref 135–147)
T4 FREE SERPL-MCNC: 1.01 NG/DL (ref 0.61–1.12)
TSH SERPL DL<=0.05 MIU/L-ACNC: <0.01 UIU/ML (ref 0.45–4.5)
WBC # BLD AUTO: 1.81 THOUSAND/UL (ref 4.31–10.16)

## 2024-10-08 PROCEDURE — 83735 ASSAY OF MAGNESIUM: CPT

## 2024-10-08 PROCEDURE — 83690 ASSAY OF LIPASE: CPT

## 2024-10-08 PROCEDURE — 84443 ASSAY THYROID STIM HORMONE: CPT

## 2024-10-08 PROCEDURE — 86304 IMMUNOASSAY TUMOR CA 125: CPT

## 2024-10-08 PROCEDURE — 80053 COMPREHEN METABOLIC PANEL: CPT

## 2024-10-08 PROCEDURE — 85025 COMPLETE CBC W/AUTO DIFF WBC: CPT

## 2024-10-08 PROCEDURE — 84439 ASSAY OF FREE THYROXINE: CPT

## 2024-10-08 PROCEDURE — 36415 COLL VENOUS BLD VENIPUNCTURE: CPT

## 2024-10-08 PROCEDURE — 82150 ASSAY OF AMYLASE: CPT

## 2024-10-10 ENCOUNTER — TELEPHONE (OUTPATIENT)
Age: 74
End: 2024-10-10

## 2024-10-10 DIAGNOSIS — U07.1 COVID: Primary | ICD-10-CM

## 2024-10-10 RX ORDER — NIRMATRELVIR AND RITONAVIR 300-100 MG
3 KIT ORAL 2 TIMES DAILY
Qty: 30 TABLET | Refills: 0 | Status: SHIPPED | OUTPATIENT
Start: 2024-10-10 | End: 2024-10-15

## 2024-10-10 NOTE — TELEPHONE ENCOUNTER
ANDRE Colmenares    Patient called back  and would like to thank Dr. Colmenares for sending her Plaxlovid to the pharmacy for her. Patient really appreciates it.  NFA  Thank you!!   call from sarah bejarano ,clinical report give,,she will present 
to her medical director to find out if patient will stay here or tx

## 2024-10-10 NOTE — TELEPHONE ENCOUNTER
Called and spoke with patient about CT.  Per [facility] can not get her in before 10/17.  Patient is not welling to go to another site to get the CT done.  Per Patient, she has a fever. Told her that I will talk to Rosario and someone will get back to her.

## 2024-10-10 NOTE — TELEPHONE ENCOUNTER
She is considered higher risk with her age and medical problems.  Paxlovid can be given but has to be within 5 days of onset of symptoms - when did her symptoms start?

## 2024-10-10 NOTE — TELEPHONE ENCOUNTER
Patient called to report that she tested positive for COVID today so she wanted to know if the doctor would recommend a medication for her to be on. She is concerned because she is on chemo treatments currently. Can someone please follow up with the patient and advise     thank you

## 2024-10-10 NOTE — TELEPHONE ENCOUNTER
Pt called and asked to speak with Dr. Colmenares regarding being tested positive for Covid.    Please advise of the previous message and contact pt at the earliest convenience, thank you.

## 2024-10-10 NOTE — TELEPHONE ENCOUNTER
Spoke to pt, she was made aware, if [facility] doesn't have it she reports she will call around.  Verbalized understanding with simvastatin to hold and 1/2 of amlodipine.

## 2024-10-10 NOTE — TELEPHONE ENCOUNTER
Attempted to call pt twice however both times pt answered she kept ending the call right away, trying to call back the 3rd time phone just rings.   According from her conversation with GYN this morning, she is on day 3.

## 2024-10-10 NOTE — TELEPHONE ENCOUNTER
I spoke with Nallely and instructed she speak with her PCP related to COVID management.  Nallely also states she has rescheduled her CT to 10/25.

## 2024-10-10 NOTE — TELEPHONE ENCOUNTER
Patient calling to report 3 days of COVID cold-like symptoms with positive test today. Patient wondering if she can be prescribed a COVID medication. Please advise.

## 2024-10-11 ENCOUNTER — TELEPHONE (OUTPATIENT)
Age: 74
End: 2024-10-11

## 2024-10-11 NOTE — TELEPHONE ENCOUNTER
FYI:   Pt called confused about her upcoming appts.Discussed with pt the appts that are sched at this time. OV with Dr Jacome for 10/17,CT Scan sched for 10/25 and Tx sched for 10/23.    Informed pt that according to chart notes they are going to try to get her CT Scan sched sooner and she will be informed.    Pt stated she cont to take her Paxlovid per PCP and will complete that on Tuesday 10/16. Pt concerned about how she will do all this with being +covid,discussed with pt the guidelines for covid and she understands.    Pls notify pt if any appts should change per pt request.

## 2024-10-14 ENCOUNTER — TELEPHONE (OUTPATIENT)
Dept: GYNECOLOGIC ONCOLOGY | Facility: CLINIC | Age: 74
End: 2024-10-14

## 2024-10-14 DIAGNOSIS — C54.1 ENDOMETRIAL CANCER (HCC): Primary | ICD-10-CM

## 2024-10-14 NOTE — TELEPHONE ENCOUNTER
Called and spoke with patient that her CT was scheduled for 10/15 at 9:30 am in [facility].  Patient states that she takes Paxlovid at 10 pm tonight.  Can she take the barium at 9 pm and still take the Paxlovid at 10 pm?  Told her that I will call her back when I talk to Rosario.

## 2024-10-14 NOTE — TELEPHONE ENCOUNTER
Patient calling for directions and to confirm her appointment with Dr. Jacome on 10/17/24 @ 1015. No further questions or concerns.

## 2024-10-14 NOTE — TELEPHONE ENCOUNTER
Called and spoke with patient about instructions for the CT scan. Patient can take barium at 9 pm and the paxlovid at 10 pm. Patient gave her verbal understanding.

## 2024-10-15 ENCOUNTER — HOSPITAL ENCOUNTER (OUTPATIENT)
Dept: CT IMAGING | Facility: HOSPITAL | Age: 74
Discharge: HOME | End: 2024-10-15
Payer: MEDICARE

## 2024-10-15 ENCOUNTER — HOSPITAL ENCOUNTER (OUTPATIENT)
Dept: INFUSION CENTER | Facility: HOSPITAL | Age: 74
Discharge: HOME | End: 2024-10-15
Attending: OBSTETRICS & GYNECOLOGY

## 2024-10-15 DIAGNOSIS — C54.1 ENDOMETRIAL CANCER (HCC): ICD-10-CM

## 2024-10-15 PROCEDURE — 71260 CT THORAX DX C+: CPT

## 2024-10-15 PROCEDURE — 74177 CT ABD & PELVIS W/CONTRAST: CPT

## 2024-10-15 RX ADMIN — IOHEXOL 50 ML: 350 INJECTION, SOLUTION INTRAVENOUS at 09:39

## 2024-10-17 ENCOUNTER — OFFICE VISIT (OUTPATIENT)
Age: 74
End: 2024-10-17
Payer: MEDICARE

## 2024-10-17 ENCOUNTER — TELEPHONE (OUTPATIENT)
Age: 74
End: 2024-10-17

## 2024-10-17 ENCOUNTER — TELEPHONE (OUTPATIENT)
Dept: GYNECOLOGIC ONCOLOGY | Facility: CLINIC | Age: 74
End: 2024-10-17

## 2024-10-17 VITALS
HEART RATE: 85 BPM | BODY MASS INDEX: 21.54 KG/M2 | SYSTOLIC BLOOD PRESSURE: 138 MMHG | WEIGHT: 126.2 LBS | OXYGEN SATURATION: 99 % | DIASTOLIC BLOOD PRESSURE: 84 MMHG | TEMPERATURE: 97.9 F | RESPIRATION RATE: 18 BRPM | HEIGHT: 64 IN

## 2024-10-17 DIAGNOSIS — C54.1 ENDOMETRIAL CANCER (HCC): Primary | ICD-10-CM

## 2024-10-17 PROCEDURE — G2211 COMPLEX E/M VISIT ADD ON: HCPCS | Performed by: OBSTETRICS & GYNECOLOGY

## 2024-10-17 PROCEDURE — 99459 PELVIC EXAMINATION: CPT | Performed by: OBSTETRICS & GYNECOLOGY

## 2024-10-17 PROCEDURE — 99215 OFFICE O/P EST HI 40 MIN: CPT | Performed by: OBSTETRICS & GYNECOLOGY

## 2024-10-17 RX ORDER — HEPARIN SODIUM 5000 [USP'U]/ML
5000 INJECTION, SOLUTION INTRAVENOUS; SUBCUTANEOUS
OUTPATIENT
Start: 2024-10-17 | End: 2024-10-18

## 2024-10-17 RX ORDER — SODIUM CHLORIDE, SODIUM LACTATE, POTASSIUM CHLORIDE, CALCIUM CHLORIDE 600; 310; 30; 20 MG/100ML; MG/100ML; MG/100ML; MG/100ML
125 INJECTION, SOLUTION INTRAVENOUS CONTINUOUS
OUTPATIENT
Start: 2024-10-17

## 2024-10-17 RX ORDER — ACETAMINOPHEN 325 MG/1
975 TABLET ORAL ONCE
OUTPATIENT
Start: 2024-10-17 | End: 2024-10-17

## 2024-10-17 NOTE — H&P (VIEW-ONLY)
Assessment/Plan:    Problem List Items Addressed This Visit          Genitourinary    Endometrial cancer (HCC) - Primary     74-year-old with stage III C2 high-grade endometrial cancer receiving neoadjuvant chemotherapy with Taxol 135 mg/m², carboplatin AUC 4, dostarlimab 500 mg IV every 21 days.  Carboplatin was dose reduced due to thrombocytopenia and neutropenia.  She has been tolerating treatment well.  She has pre-existing hyperthyroidism.  I reviewed CT chest abdomen pelvis images.  There has been measurable response and a left perirectal lymph node just below the level of the renal vessels, bilateral iliac/obturator lymphadenopathy and a decrease in the size of the uterine mass with stable pulmonary nodules.  I also reviewed , CBC, CMP, free T4, TSH.  Current ANC is 1.15.  Her performance status is 0.  1.  Given measurable disease response after 3 cycles of neoadjuvant carboplatin, Taxol, dostarlimab, I recommended surgical cytoreduction inclusive of examination under anesthesia, possible robotic assisted total laparoscopic hysterectomy, bilateral salpingo-oophorectomy, pelvic and periaortic lymph node dissections, possible exploratory laparotomy and all other indicated procedures.  She understands the risks of the operation and agrees to proceed as outlined.  Consent for surgery was obtained by me.  2.  She will continue weekly CBC, CMP prior to surgery to assess recovery of counts.  3.  Plan for 3 cycles of adjuvant chemotherapy after surgical cytoreduction and likely radiation therapy postchemotherapy and surgery.         Relevant Orders    Case request operating room: HYSTERECTOMY LAPAROSCOPIC TOTAL (LTH) W/ ROBOTICS (Completed)    Type and screen    HEMOGLOBIN A1C W/ EAG ESTIMATION           CHIEF COMPLAINT: Follow-up after 3 cycles of neoadjuvant chemotherapy       Subjective:     Problem:  Cancer Staging   Endometrial cancer (HCC)  Staging form: Corpus Uteri - Carcinoma, AJCC 8th Edition  -  Clinical: FIGO Stage IIIC2 - Signed by Mansoor Alonso MD on 8/29/2024  - Pathologic: No stage assigned - Signed by Mansoor Alonso MD on 8/29/2024      Previous therapy:  Oncology History   Endometrial cancer (HCC)   7/18/2024 Initial Diagnosis    Endometrial cancer (HCC)     8/13/2024 -  Chemotherapy    alteplase (CATHFLO), 2 mg, Intracatheter, Every 1 Minute as needed, 3 of 12 cycles  palonosetron (ALOXI), 0.25 mg, Intravenous, Once, 3 of 6 cycles  Administration: 0.25 mg (8/13/2024), 0.25 mg (9/3/2024), 0.25 mg (10/2/2024)  fosaprepitant (EMEND) IVPB, 150 mg, Intravenous, Once, 3 of 6 cycles  Administration: 150 mg (8/13/2024), 150 mg (9/3/2024), 150 mg (10/2/2024)  dostarlimab-gxly (JEMPERLI) IVPB, 500 mg, Intravenous, Once, 3 of 12 cycles  Administration: 500 mg (8/13/2024), 500 mg (9/3/2024), 500 mg (10/2/2024)  CARBOplatin (PARAPLATIN) IVPB (GO AUC DOSING), 389 mg, Intravenous, Once, 3 of 6 cycles  Administration: 389 mg (8/13/2024), 392.5 mg (9/3/2024), 308.4 mg (10/2/2024)  PACLItaxel (TAXOL) chemo IVPB, 135 mg/m2 = 211.8 mg (77.1 % of original dose 175 mg/m2), Intravenous, Once, 3 of 6 cycles  Dose modification: 135 mg/m2 (original dose 175 mg/m2, Cycle 1, Reason: Other (Must fill in a comment), Comment: prescribed starting dose)  Administration: 211.8 mg (8/13/2024), 211.8 mg (9/3/2024), 211.8 mg (10/2/2024)     8/29/2024 -  Cancer Staged    clinical stage IIIC2 disease (retroperitoneal and aortocaval lymphadenopathy) vs IVB (possible bowel mesentery disease) based on imaging  Staging form: Corpus Uteri - Carcinoma, AJCC 8th Edition  - Clinical: FIGO Stage IIIC2 - Signed by Mansoor Alonso MD on 8/29/2024  Histopathologic type: Endometrioid adenocarcinoma, NOS  Stage prefix: Initial diagnosis  Histologic grade (G): G3  Histologic grading system: 3 grade system  Specimen type: Biopsy / Limited Resection             Patient ID: Nallely Peacock is a 74 y.o. female  Returns for treatment  discussion.  She has had 3 cycles of carboplatin, dostarlimab, Taxol.  Carboplatin has been dose reduced to AUC 4 due to thrombocytopenia and neutropenia.  Labs from 10/8/2024 revealed a  6.9 units/mL, TSH of less than 0.01, free T4 normal, CMP within normal limits with an albumin of 4 g/dL.  Current ANC is 1.  1 5 with a hemoglobin of 10.5 g/dL and a platelet count of 170.  CT chest abdomen pelvis from 10/15/2024 revealed stable small pulmonary nodules all measuring 3 to 5 mm.  There has been a decrease in size of the left periaortic lymph node just below the level of the renal vessels measuring 1.9 cm, right pelvic sidewall lymphadenopathy now measuring 3.9 cm and left pelvic sidewall lymphadenopathy measuring 1.9 cm.  There is no evidence of peritoneal disease or ascites.  The uterine disease has decreased in size as well.  I reviewed the images and concur with the radiologist's opinion.  She has been tolerating the chemotherapy well.  She is able to perform her actives of daily living without difficulty.  She had COVID earlier in October but is currently asymptomatic.  No other interval change in medications or medical history since her last visit to the office.        Review of Systems   Constitutional:  Negative for activity change and unexpected weight change.   HENT: Negative.     Eyes: Negative.    Respiratory: Negative.     Cardiovascular: Negative.    Gastrointestinal:  Negative for abdominal distention and abdominal pain.   Endocrine: Negative.    Genitourinary:  Negative for pelvic pain and vaginal bleeding.   Musculoskeletal: Negative.    Skin: Negative.    Allergic/Immunologic: Negative.    Neurological: Negative.    Hematological: Negative.    Psychiatric/Behavioral:  The patient is nervous/anxious.        Current Outpatient Medications   Medication Sig Dispense Refill    calcium carbonate (Calcium 600) 600 MG tablet Take 600 mg by mouth 2 (two) times a day with meals      methimazole (TAPAZOLE)  "5 mg tablet Take 1 tablet (5 mg total) by mouth 3 (three) times a day 90 tablet 1    Multiple Vitamin (MULTIVITAMIN ADULT PO) Take 1 tablet by mouth daily      acetaminophen (TYLENOL) 160 MG chewable tablet Chew 160 mg every 6 (six) hours as needed for mild pain (Patient not taking: Reported on 9/19/2024)      amLODIPine (NORVASC) 10 mg tablet Take 1 tablet (10 mg total) by mouth daily (Patient not taking: Reported on 10/17/2024) 90 tablet 3    LORazepam (ATIVAN) 1 mg tablet Take 1 tablet (1 mg total) by mouth every 8 (eight) hours as needed (nausea or anxiety) (Patient not taking: Reported on 10/7/2024) 20 tablet 0    ondansetron (ZOFRAN) 8 mg tablet Take 1 tablet (8 mg total) by mouth every 8 (eight) hours as needed for nausea or vomiting (Patient not taking: Reported on 10/7/2024) 30 tablet 1    simvastatin (ZOCOR) 10 mg tablet Take 1 tablet (10 mg total) by mouth daily at bedtime (Patient not taking: Reported on 10/17/2024) 90 tablet 3     No current facility-administered medications for this visit.       Allergies   Allergen Reactions    Citrus - Food Allergy Hives    Codeine Hives    Strawberry (Diagnostic) - Food Allergy Hives    Penicillin G Rash       Past Medical History:   Diagnosis Date    High blood pressure     Hyperlipidemia        Past Surgical History:   Procedure Laterality Date    COLONOSCOPY      IR PORT PLACEMENT  8/26/2024    LEG SURGERY Right     inserted ebenezer 12/20/2021       OB History    No obstetric history on file.         No family history on file.    The following portions of the patient's history were reviewed and updated as appropriate: allergies, current medications, past family history, past medical history, past social history, past surgical history, and problem list.      Objective:    Blood pressure 138/84, pulse 85, temperature 97.9 °F (36.6 °C), temperature source Temporal, resp. rate 18, height 5' 4\" (1.626 m), weight 57.2 kg (126 lb 3.2 oz), SpO2 99%.  Body mass index is 21.66 " kg/m².    Physical Exam  Vitals reviewed. Exam conducted with a chaperone present.   Constitutional:       General: She is not in acute distress.     Appearance: Normal appearance. She is well-developed and normal weight. She is not ill-appearing, toxic-appearing or diaphoretic.   HENT:      Head: Normocephalic and atraumatic.   Eyes:      General: No scleral icterus.     Extraocular Movements: Extraocular movements intact.      Conjunctiva/sclera: Conjunctivae normal.   Neck:      Thyroid: No thyromegaly.   Pulmonary:      Effort: Pulmonary effort is normal.   Abdominal:      General: There is no distension.      Palpations: Abdomen is soft. There is no mass.      Tenderness: There is no abdominal tenderness. There is no guarding or rebound.      Hernia: No hernia is present.   Genitourinary:     Comments: External female genitalia normal.  No evidence of mass or lesion.  Speculum examination reveals an atrophic vagina.  The cervix is grossly normal.  There is a small amount of pink-tinged discharge coming from the cervix.  Bimanual examination reveals a mobile approximately 10-week size uterus without palpable adnexal masses.  There is no evidence of parametrial disease.  Musculoskeletal:         General: No swelling or tenderness.      Cervical back: Normal range of motion and neck supple.      Right lower leg: Edema present.      Left lower leg: Edema present.      Comments: 1+ bilateral   Lymphadenopathy:      Cervical: No cervical adenopathy.   Skin:     General: Skin is warm and dry.      Coloration: Skin is not jaundiced or pale.      Findings: No lesion or rash.   Neurological:      General: No focal deficit present.      Mental Status: She is alert and oriented to person, place, and time. Mental status is at baseline.      Cranial Nerves: No cranial nerve deficit.      Motor: No weakness.      Gait: Gait normal.   Psychiatric:         Mood and Affect: Mood normal.         Behavior: Behavior normal.          Thought Content: Thought content normal.         Judgment: Judgment normal.           Lab Results   Component Value Date     6.9 10/08/2024     Lab Results   Component Value Date    WBC 1.81 (L) 10/08/2024    HGB 10.5 (L) 10/08/2024    HCT 33.4 (L) 10/08/2024    MCV 99 (H) 10/08/2024     10/08/2024     Lab Results   Component Value Date    K 4.8 10/08/2024     10/08/2024    CO2 29 10/08/2024    BUN 18 10/08/2024    CREATININE 0.78 10/08/2024    GLUF 80 10/01/2024    CALCIUM 9.4 10/08/2024    CORRECTEDCA 10.2 (H) 08/08/2024    AST 14 10/08/2024    ALT 11 10/08/2024    ALKPHOS 71 10/08/2024    EGFR 74 10/08/2024        Trend:  Lab Results   Component Value Date     6.9 10/08/2024     6.4 10/01/2024     9.6 09/12/2024     33.7 08/29/2024     61.1 (H) 08/22/2024     49.8 (H) 08/08/2024   CT chest abdomen pelvis w contrast  Narrative: CT CHEST, ABDOMEN AND PELVIS WITH IV CONTRAST    INDICATION: C54.1: Malignant neoplasm of endometrium. Endometrial cancer on chemotherapy.    COMPARISON: CT chest/abdomen/pelvis 7/26/2024    TECHNIQUE: CT examination of the chest, abdomen and pelvis was performed. Dual energy CT scan technique (DECT) was employed. Multiplanar 2D reformatted images were created from the source data.    This examination, like all CT scans performed in the [facility], was performed utilizing techniques to minimize radiation dose exposure, including the use of iterative reconstruction and automated exposure control. Radiation dose length   product (DLP) for this visit: 636.88 mGy-cm    IV Contrast: 50 mL of iohexol (OMNIPAQUE)  Enteric Contrast: Administered.    FINDINGS:    CHEST    LUNGS: The central airways are patent. Redemonstrated sub-6 mm pulmonary nodules, stable from prior study with reference as follows on series 604:  *  5 mm left lower lobe nodule with triangular morphology (image 214), stable.  *  3 mm left lower lobe nodule  (image 172), stable.  *  3 mm right lower lobe nodule (image 150), stable.  *  3 mm left upper lobe nodule (image 150), stable.    No new or enlarging pulmonary nodules.    PLEURA: Unremarkable.    HEART/GREAT VESSELS: The heart is normal in size. Coronary artery calcifications. Aortic valvular calcifications. No thoracic aortic aneurysm.    MEDIASTINUM AND KIERAN: Unremarkable.    CHEST WALL AND LOWER NECK: With subcentimeter left thyroid lobe nodule. Right chest wall port with catheter tip terminating in the upper right atrium.    ABDOMEN    LIVER/BILIARY TREE: Stable subcentimeter hypoattenuating lesion(s), too small to characterize but statistically likely benign, which do not require follow-up (ACR White Paper 2017). Stable 1.4 cm hepatic cyst adjacent to the IVC (301/98). No suspicious   mass. Normal hepatic contours. No biliary dilation.    GALLBLADDER: Cholelithiasis without findings of acute cholecystitis.    SPLEEN: Unremarkable.    PANCREAS: Unremarkable.    ADRENAL GLANDS: Unremarkable.    KIDNEYS/URETERS: Unremarkable. No hydronephrosis.    STOMACH AND BOWEL: Colonic diverticulosis without findings of acute diverticulitis.    APPENDIX: No findings to suggest appendicitis.    ABDOMINOPELVIC CAVITY: No ascites. No pneumoperitoneum.    Decrease in size of steven metastatic disease in keeping with treatment response. Reference nodes are as follows on series 301:  *  Aortocaval node measuring 1.4 x 1.4 cm (image 131), previously 1.8 x 1.5 cm.  *  Left para-aortic node measuring 1.9 x 1.7 cm (image 132), previously 2.7 x 1.7 cm.  *  Right pelvic sidewall node measuring 3.9 x 2.6 cm (image 189), previously 4.1 x 2.8 cm.  *  Left pelvic sidewall node measuring 1.9 x 1.3 cm (image 193), previously 2.1 x 1.5 cm.    No new or progressive lymphadenopathy.    VESSELS: Atherosclerosis without abdominal aortic aneurysm.    PELVIS    REPRODUCTIVE ORGANS: Marked decrease in size of heterogeneous uterine mass measuring  approximately 3.3 x 5.1 x 3.2 cm (301/188; 602/105), previously 11.9 x 9.0 x 7.6 cm when measured similarly. Findings are compatible with treatment response.    Stable simple appearing bilateral adnexal cysts measuring 3 cm on the right and 1.8 cm on the left (series 301, image 177).    URINARY BLADDER: Unremarkable.    ABDOMINAL WALL/INGUINAL REGIONS: Unremarkable.    BONES: No acute fracture or suspicious osseous lesion. Spinal degenerative changes. Fixation hardware involving the proximal right femur, partially imaged.  Impression: 1.  Decrease in size of abdominal pelvic steven metastases as well as substantial decrease in size of the primary uterine mass compatible with treatment response. No new or progressive metastatic disease within the chest, abdomen, or pelvis.  2.  Stable sub-6 mm pulmonary nodules which remain indeterminate in etiology, possibly benign. Continued attention on follow-up imaging is recommended.  3.  Stable bilateral simple appearing adnexal cysts.    Workstation performed: [id number]

## 2024-10-17 NOTE — PATIENT INSTRUCTIONS
1. Nothing to eat or drink after midnight prior to the operation.    2. Please avoid ibuprofen, aspirin, fish oil for 7 days prior to surgery  3. Medications to take the morning of surgery with a sip of water: tapazole

## 2024-10-17 NOTE — PROGRESS NOTES
Assessment/Plan:    Problem List Items Addressed This Visit          Genitourinary    Endometrial cancer (HCC) - Primary     74-year-old with stage III C2 high-grade endometrial cancer receiving neoadjuvant chemotherapy with Taxol 135 mg/m², carboplatin AUC 4, dostarlimab 500 mg IV every 21 days.  Carboplatin was dose reduced due to thrombocytopenia and neutropenia.  She has been tolerating treatment well.  She has pre-existing hyperthyroidism.  I reviewed CT chest abdomen pelvis images.  There has been measurable response and a left perirectal lymph node just below the level of the renal vessels, bilateral iliac/obturator lymphadenopathy and a decrease in the size of the uterine mass with stable pulmonary nodules.  I also reviewed , CBC, CMP, free T4, TSH.  Current ANC is 1.15.  Her performance status is 0.  1.  Given measurable disease response after 3 cycles of neoadjuvant carboplatin, Taxol, dostarlimab, I recommended surgical cytoreduction inclusive of examination under anesthesia, possible robotic assisted total laparoscopic hysterectomy, bilateral salpingo-oophorectomy, pelvic and periaortic lymph node dissections, possible exploratory laparotomy and all other indicated procedures.  She understands the risks of the operation and agrees to proceed as outlined.  Consent for surgery was obtained by me.  2.  She will continue weekly CBC, CMP prior to surgery to assess recovery of counts.  3.  Plan for 3 cycles of adjuvant chemotherapy after surgical cytoreduction and likely radiation therapy postchemotherapy and surgery.         Relevant Orders    Case request operating room: HYSTERECTOMY LAPAROSCOPIC TOTAL (LTH) W/ ROBOTICS (Completed)    Type and screen    HEMOGLOBIN A1C W/ EAG ESTIMATION           CHIEF COMPLAINT: Follow-up after 3 cycles of neoadjuvant chemotherapy       Subjective:     Problem:  Cancer Staging   Endometrial cancer (HCC)  Staging form: Corpus Uteri - Carcinoma, AJCC 8th Edition  -  Clinical: FIGO Stage IIIC2 - Signed by Mansoor Alonso MD on 8/29/2024  - Pathologic: No stage assigned - Signed by Mansoor Alonso MD on 8/29/2024      Previous therapy:  Oncology History   Endometrial cancer (HCC)   7/18/2024 Initial Diagnosis    Endometrial cancer (HCC)     8/13/2024 -  Chemotherapy    alteplase (CATHFLO), 2 mg, Intracatheter, Every 1 Minute as needed, 3 of 12 cycles  palonosetron (ALOXI), 0.25 mg, Intravenous, Once, 3 of 6 cycles  Administration: 0.25 mg (8/13/2024), 0.25 mg (9/3/2024), 0.25 mg (10/2/2024)  fosaprepitant (EMEND) IVPB, 150 mg, Intravenous, Once, 3 of 6 cycles  Administration: 150 mg (8/13/2024), 150 mg (9/3/2024), 150 mg (10/2/2024)  dostarlimab-gxly (JEMPERLI) IVPB, 500 mg, Intravenous, Once, 3 of 12 cycles  Administration: 500 mg (8/13/2024), 500 mg (9/3/2024), 500 mg (10/2/2024)  CARBOplatin (PARAPLATIN) IVPB (GO AUC DOSING), 389 mg, Intravenous, Once, 3 of 6 cycles  Administration: 389 mg (8/13/2024), 392.5 mg (9/3/2024), 308.4 mg (10/2/2024)  PACLItaxel (TAXOL) chemo IVPB, 135 mg/m2 = 211.8 mg (77.1 % of original dose 175 mg/m2), Intravenous, Once, 3 of 6 cycles  Dose modification: 135 mg/m2 (original dose 175 mg/m2, Cycle 1, Reason: Other (Must fill in a comment), Comment: prescribed starting dose)  Administration: 211.8 mg (8/13/2024), 211.8 mg (9/3/2024), 211.8 mg (10/2/2024)     8/29/2024 -  Cancer Staged    clinical stage IIIC2 disease (retroperitoneal and aortocaval lymphadenopathy) vs IVB (possible bowel mesentery disease) based on imaging  Staging form: Corpus Uteri - Carcinoma, AJCC 8th Edition  - Clinical: FIGO Stage IIIC2 - Signed by Mansoor Alonso MD on 8/29/2024  Histopathologic type: Endometrioid adenocarcinoma, NOS  Stage prefix: Initial diagnosis  Histologic grade (G): G3  Histologic grading system: 3 grade system  Specimen type: Biopsy / Limited Resection             Patient ID: Nallely Peacock is a 74 y.o. female  Returns for treatment  discussion.  She has had 3 cycles of carboplatin, dostarlimab, Taxol.  Carboplatin has been dose reduced to AUC 4 due to thrombocytopenia and neutropenia.  Labs from 10/8/2024 revealed a  6.9 units/mL, TSH of less than 0.01, free T4 normal, CMP within normal limits with an albumin of 4 g/dL.  Current ANC is 1.  1 5 with a hemoglobin of 10.5 g/dL and a platelet count of 170.  CT chest abdomen pelvis from 10/15/2024 revealed stable small pulmonary nodules all measuring 3 to 5 mm.  There has been a decrease in size of the left periaortic lymph node just below the level of the renal vessels measuring 1.9 cm, right pelvic sidewall lymphadenopathy now measuring 3.9 cm and left pelvic sidewall lymphadenopathy measuring 1.9 cm.  There is no evidence of peritoneal disease or ascites.  The uterine disease has decreased in size as well.  I reviewed the images and concur with the radiologist's opinion.  She has been tolerating the chemotherapy well.  She is able to perform her actives of daily living without difficulty.  She had COVID earlier in October but is currently asymptomatic.  No other interval change in medications or medical history since her last visit to the office.        Review of Systems   Constitutional:  Negative for activity change and unexpected weight change.   HENT: Negative.     Eyes: Negative.    Respiratory: Negative.     Cardiovascular: Negative.    Gastrointestinal:  Negative for abdominal distention and abdominal pain.   Endocrine: Negative.    Genitourinary:  Negative for pelvic pain and vaginal bleeding.   Musculoskeletal: Negative.    Skin: Negative.    Allergic/Immunologic: Negative.    Neurological: Negative.    Hematological: Negative.    Psychiatric/Behavioral:  The patient is nervous/anxious.        Current Outpatient Medications   Medication Sig Dispense Refill    calcium carbonate (Calcium 600) 600 MG tablet Take 600 mg by mouth 2 (two) times a day with meals      methimazole (TAPAZOLE)  "5 mg tablet Take 1 tablet (5 mg total) by mouth 3 (three) times a day 90 tablet 1    Multiple Vitamin (MULTIVITAMIN ADULT PO) Take 1 tablet by mouth daily      acetaminophen (TYLENOL) 160 MG chewable tablet Chew 160 mg every 6 (six) hours as needed for mild pain (Patient not taking: Reported on 9/19/2024)      amLODIPine (NORVASC) 10 mg tablet Take 1 tablet (10 mg total) by mouth daily (Patient not taking: Reported on 10/17/2024) 90 tablet 3    LORazepam (ATIVAN) 1 mg tablet Take 1 tablet (1 mg total) by mouth every 8 (eight) hours as needed (nausea or anxiety) (Patient not taking: Reported on 10/7/2024) 20 tablet 0    ondansetron (ZOFRAN) 8 mg tablet Take 1 tablet (8 mg total) by mouth every 8 (eight) hours as needed for nausea or vomiting (Patient not taking: Reported on 10/7/2024) 30 tablet 1    simvastatin (ZOCOR) 10 mg tablet Take 1 tablet (10 mg total) by mouth daily at bedtime (Patient not taking: Reported on 10/17/2024) 90 tablet 3     No current facility-administered medications for this visit.       Allergies   Allergen Reactions    Citrus - Food Allergy Hives    Codeine Hives    Strawberry (Diagnostic) - Food Allergy Hives    Penicillin G Rash       Past Medical History:   Diagnosis Date    High blood pressure     Hyperlipidemia        Past Surgical History:   Procedure Laterality Date    COLONOSCOPY      IR PORT PLACEMENT  8/26/2024    LEG SURGERY Right     inserted ebenezer 12/20/2021       OB History    No obstetric history on file.         No family history on file.    The following portions of the patient's history were reviewed and updated as appropriate: allergies, current medications, past family history, past medical history, past social history, past surgical history, and problem list.      Objective:    Blood pressure 138/84, pulse 85, temperature 97.9 °F (36.6 °C), temperature source Temporal, resp. rate 18, height 5' 4\" (1.626 m), weight 57.2 kg (126 lb 3.2 oz), SpO2 99%.  Body mass index is 21.66 " kg/m².    Physical Exam  Vitals reviewed. Exam conducted with a chaperone present.   Constitutional:       General: She is not in acute distress.     Appearance: Normal appearance. She is well-developed and normal weight. She is not ill-appearing, toxic-appearing or diaphoretic.   HENT:      Head: Normocephalic and atraumatic.   Eyes:      General: No scleral icterus.     Extraocular Movements: Extraocular movements intact.      Conjunctiva/sclera: Conjunctivae normal.   Neck:      Thyroid: No thyromegaly.   Pulmonary:      Effort: Pulmonary effort is normal.   Abdominal:      General: There is no distension.      Palpations: Abdomen is soft. There is no mass.      Tenderness: There is no abdominal tenderness. There is no guarding or rebound.      Hernia: No hernia is present.   Genitourinary:     Comments: External female genitalia normal.  No evidence of mass or lesion.  Speculum examination reveals an atrophic vagina.  The cervix is grossly normal.  There is a small amount of pink-tinged discharge coming from the cervix.  Bimanual examination reveals a mobile approximately 10-week size uterus without palpable adnexal masses.  There is no evidence of parametrial disease.  Musculoskeletal:         General: No swelling or tenderness.      Cervical back: Normal range of motion and neck supple.      Right lower leg: Edema present.      Left lower leg: Edema present.      Comments: 1+ bilateral   Lymphadenopathy:      Cervical: No cervical adenopathy.   Skin:     General: Skin is warm and dry.      Coloration: Skin is not jaundiced or pale.      Findings: No lesion or rash.   Neurological:      General: No focal deficit present.      Mental Status: She is alert and oriented to person, place, and time. Mental status is at baseline.      Cranial Nerves: No cranial nerve deficit.      Motor: No weakness.      Gait: Gait normal.   Psychiatric:         Mood and Affect: Mood normal.         Behavior: Behavior normal.          Thought Content: Thought content normal.         Judgment: Judgment normal.           Lab Results   Component Value Date     6.9 10/08/2024     Lab Results   Component Value Date    WBC 1.81 (L) 10/08/2024    HGB 10.5 (L) 10/08/2024    HCT 33.4 (L) 10/08/2024    MCV 99 (H) 10/08/2024     10/08/2024     Lab Results   Component Value Date    K 4.8 10/08/2024     10/08/2024    CO2 29 10/08/2024    BUN 18 10/08/2024    CREATININE 0.78 10/08/2024    GLUF 80 10/01/2024    CALCIUM 9.4 10/08/2024    CORRECTEDCA 10.2 (H) 08/08/2024    AST 14 10/08/2024    ALT 11 10/08/2024    ALKPHOS 71 10/08/2024    EGFR 74 10/08/2024        Trend:  Lab Results   Component Value Date     6.9 10/08/2024     6.4 10/01/2024     9.6 09/12/2024     33.7 08/29/2024     61.1 (H) 08/22/2024     49.8 (H) 08/08/2024   CT chest abdomen pelvis w contrast  Narrative: CT CHEST, ABDOMEN AND PELVIS WITH IV CONTRAST    INDICATION: C54.1: Malignant neoplasm of endometrium. Endometrial cancer on chemotherapy.    COMPARISON: CT chest/abdomen/pelvis 7/26/2024    TECHNIQUE: CT examination of the chest, abdomen and pelvis was performed. Dual energy CT scan technique (DECT) was employed. Multiplanar 2D reformatted images were created from the source data.    This examination, like all CT scans performed in the [facility], was performed utilizing techniques to minimize radiation dose exposure, including the use of iterative reconstruction and automated exposure control. Radiation dose length   product (DLP) for this visit: 636.88 mGy-cm    IV Contrast: 50 mL of iohexol (OMNIPAQUE)  Enteric Contrast: Administered.    FINDINGS:    CHEST    LUNGS: The central airways are patent. Redemonstrated sub-6 mm pulmonary nodules, stable from prior study with reference as follows on series 604:  *  5 mm left lower lobe nodule with triangular morphology (image 214), stable.  *  3 mm left lower lobe nodule  (image 172), stable.  *  3 mm right lower lobe nodule (image 150), stable.  *  3 mm left upper lobe nodule (image 150), stable.    No new or enlarging pulmonary nodules.    PLEURA: Unremarkable.    HEART/GREAT VESSELS: The heart is normal in size. Coronary artery calcifications. Aortic valvular calcifications. No thoracic aortic aneurysm.    MEDIASTINUM AND KIERAN: Unremarkable.    CHEST WALL AND LOWER NECK: With subcentimeter left thyroid lobe nodule. Right chest wall port with catheter tip terminating in the upper right atrium.    ABDOMEN    LIVER/BILIARY TREE: Stable subcentimeter hypoattenuating lesion(s), too small to characterize but statistically likely benign, which do not require follow-up (ACR White Paper 2017). Stable 1.4 cm hepatic cyst adjacent to the IVC (301/98). No suspicious   mass. Normal hepatic contours. No biliary dilation.    GALLBLADDER: Cholelithiasis without findings of acute cholecystitis.    SPLEEN: Unremarkable.    PANCREAS: Unremarkable.    ADRENAL GLANDS: Unremarkable.    KIDNEYS/URETERS: Unremarkable. No hydronephrosis.    STOMACH AND BOWEL: Colonic diverticulosis without findings of acute diverticulitis.    APPENDIX: No findings to suggest appendicitis.    ABDOMINOPELVIC CAVITY: No ascites. No pneumoperitoneum.    Decrease in size of steven metastatic disease in keeping with treatment response. Reference nodes are as follows on series 301:  *  Aortocaval node measuring 1.4 x 1.4 cm (image 131), previously 1.8 x 1.5 cm.  *  Left para-aortic node measuring 1.9 x 1.7 cm (image 132), previously 2.7 x 1.7 cm.  *  Right pelvic sidewall node measuring 3.9 x 2.6 cm (image 189), previously 4.1 x 2.8 cm.  *  Left pelvic sidewall node measuring 1.9 x 1.3 cm (image 193), previously 2.1 x 1.5 cm.    No new or progressive lymphadenopathy.    VESSELS: Atherosclerosis without abdominal aortic aneurysm.    PELVIS    REPRODUCTIVE ORGANS: Marked decrease in size of heterogeneous uterine mass measuring  approximately 3.3 x 5.1 x 3.2 cm (301/188; 602/105), previously 11.9 x 9.0 x 7.6 cm when measured similarly. Findings are compatible with treatment response.    Stable simple appearing bilateral adnexal cysts measuring 3 cm on the right and 1.8 cm on the left (series 301, image 177).    URINARY BLADDER: Unremarkable.    ABDOMINAL WALL/INGUINAL REGIONS: Unremarkable.    BONES: No acute fracture or suspicious osseous lesion. Spinal degenerative changes. Fixation hardware involving the proximal right femur, partially imaged.  Impression: 1.  Decrease in size of abdominal pelvic steven metastases as well as substantial decrease in size of the primary uterine mass compatible with treatment response. No new or progressive metastatic disease within the chest, abdomen, or pelvis.  2.  Stable sub-6 mm pulmonary nodules which remain indeterminate in etiology, possibly benign. Continued attention on follow-up imaging is recommended.  3.  Stable bilateral simple appearing adnexal cysts.    Workstation performed: [id number]

## 2024-10-17 NOTE — TELEPHONE ENCOUNTER
Patient called in said that at the appointment she said provider told her there were some medications she cannot take together and she does not remember what it was. She was on a covid med (she thinks Paxlovid) but is done with that now.  Please advise and call patient.

## 2024-10-17 NOTE — ASSESSMENT & PLAN NOTE
74-year-old with stage III C2 high-grade endometrial cancer receiving neoadjuvant chemotherapy with Taxol 135 mg/m², carboplatin AUC 4, dostarlimab 500 mg IV every 21 days.  Carboplatin was dose reduced due to thrombocytopenia and neutropenia.  She has been tolerating treatment well.  She has pre-existing hyperthyroidism.  I reviewed CT chest abdomen pelvis images.  There has been measurable response and a left perirectal lymph node just below the level of the renal vessels, bilateral iliac/obturator lymphadenopathy and a decrease in the size of the uterine mass with stable pulmonary nodules.  I also reviewed , CBC, CMP, free T4, TSH.  Current ANC is 1.15.  Her performance status is 0.  1.  Given measurable disease response after 3 cycles of neoadjuvant carboplatin, Taxol, dostarlimab, I recommended surgical cytoreduction inclusive of examination under anesthesia, possible robotic assisted total laparoscopic hysterectomy, bilateral salpingo-oophorectomy, pelvic and periaortic lymph node dissections, possible exploratory laparotomy and all other indicated procedures.  She understands the risks of the operation and agrees to proceed as outlined.  Consent for surgery was obtained by me.  2.  She will continue weekly CBC, CMP prior to surgery to assess recovery of counts.  3.  Plan for 3 cycles of adjuvant chemotherapy after surgical cytoreduction and likely radiation therapy postchemotherapy and surgery.

## 2024-10-17 NOTE — TELEPHONE ENCOUNTER
I called the patient back and she clarified that she was taken of the atorvastatin and amlodipine due to being on the paxlovid.  She has since finished the paxlovid for 2 days and she wanted to make sure that it was ok to resume the two medications.  I spoke to the provider and he stated that was fine.  I then relayed that back to the patient.

## 2024-10-17 NOTE — TELEPHONE ENCOUNTER
I did not tell her anything specifically about medications that could not be taken together.  I do not know if this was discussed with a different provider.

## 2024-10-17 NOTE — TELEPHONE ENCOUNTER
Phoned patient to discuss surgery scheduling, surgery scheduled for 10/29/2024 at [facility] .  Pre Op and Post op instructions reviewed.  Post op appointment scheduled.   All questions/concerns addressed.  Provided patient with call back number for any questions/concerns.

## 2024-10-18 ENCOUNTER — TELEPHONE (OUTPATIENT)
Age: 74
End: 2024-10-18

## 2024-10-18 NOTE — TELEPHONE ENCOUNTER
Nallely calling in to clarify if she needs the CT scan that is scheduled for 10/25, patient stated she had a CT on 10/15. She wanted to confirm before going and would like a call back as she thinks this is a duplicate order. Patient stated she has surgery upcoming and wants to be clear about her schedule. Patient stated that she would be home for another hour and asked if we could just clarify this for her.

## 2024-10-22 ENCOUNTER — APPOINTMENT (OUTPATIENT)
Dept: LAB | Facility: HOSPITAL | Age: 74
End: 2024-10-22
Payer: MEDICARE

## 2024-10-22 DIAGNOSIS — C54.1 ENDOMETRIAL CANCER (HCC): ICD-10-CM

## 2024-10-22 DIAGNOSIS — R53.81 OTHER MALAISE: ICD-10-CM

## 2024-10-22 DIAGNOSIS — Z01.818 ENCOUNTER FOR PREADMISSION TESTING: ICD-10-CM

## 2024-10-22 LAB
ALBUMIN SERPL BCG-MCNC: 3.8 G/DL (ref 3.5–5)
ALP SERPL-CCNC: 76 U/L (ref 34–104)
ALT SERPL W P-5'-P-CCNC: 9 U/L (ref 7–52)
AMYLASE SERPL-CCNC: 56 IU/L (ref 29–103)
ANION GAP SERPL CALCULATED.3IONS-SCNC: 10 MMOL/L (ref 4–13)
AST SERPL W P-5'-P-CCNC: 12 U/L (ref 13–39)
BASOPHILS # BLD AUTO: 0.05 THOUSANDS/ΜL (ref 0–0.1)
BASOPHILS NFR BLD AUTO: 1 % (ref 0–1)
BILIRUB SERPL-MCNC: 0.38 MG/DL (ref 0.2–1)
BUN SERPL-MCNC: 17 MG/DL (ref 5–25)
CALCIUM SERPL-MCNC: 9.2 MG/DL (ref 8.4–10.2)
CANCER AG125 SERPL-ACNC: 6.4 U/ML (ref 0–35)
CHLORIDE SERPL-SCNC: 106 MMOL/L (ref 96–108)
CO2 SERPL-SCNC: 27 MMOL/L (ref 21–32)
CREAT SERPL-MCNC: 0.91 MG/DL (ref 0.6–1.3)
EOSINOPHIL # BLD AUTO: 0.05 THOUSAND/ΜL (ref 0–0.61)
EOSINOPHIL NFR BLD AUTO: 1 % (ref 0–6)
ERYTHROCYTE [DISTWIDTH] IN BLOOD BY AUTOMATED COUNT: 17.6 % (ref 11.6–15.1)
EST. AVERAGE GLUCOSE BLD GHB EST-MCNC: 108 MG/DL
GFR SERPL CREATININE-BSD FRML MDRD: 61 ML/MIN/1.73SQ M
GLUCOSE SERPL-MCNC: 89 MG/DL (ref 65–140)
HBA1C MFR BLD: 5.4 %
HCT VFR BLD AUTO: 34.1 % (ref 34.8–46.1)
HGB BLD-MCNC: 10.5 G/DL (ref 11.5–15.4)
IMM GRANULOCYTES # BLD AUTO: 0.02 THOUSAND/UL (ref 0–0.2)
IMM GRANULOCYTES NFR BLD AUTO: 1 % (ref 0–2)
LIPASE SERPL-CCNC: 44 U/L (ref 11–82)
LYMPHOCYTES # BLD AUTO: 0.93 THOUSANDS/ΜL (ref 0.6–4.47)
LYMPHOCYTES NFR BLD AUTO: 22 % (ref 14–44)
MAGNESIUM SERPL-MCNC: 2.2 MG/DL (ref 1.9–2.7)
MCH RBC QN AUTO: 30.7 PG (ref 26.8–34.3)
MCHC RBC AUTO-ENTMCNC: 30.8 G/DL (ref 31.4–37.4)
MCV RBC AUTO: 100 FL (ref 82–98)
MONOCYTES # BLD AUTO: 0.51 THOUSAND/ΜL (ref 0.17–1.22)
MONOCYTES NFR BLD AUTO: 12 % (ref 4–12)
NEUTROPHILS # BLD AUTO: 2.7 THOUSANDS/ΜL (ref 1.85–7.62)
NEUTS SEG NFR BLD AUTO: 63 % (ref 43–75)
NRBC BLD AUTO-RTO: 0 /100 WBCS
PLATELET # BLD AUTO: 121 THOUSANDS/UL (ref 149–390)
PMV BLD AUTO: 10.1 FL (ref 8.9–12.7)
POTASSIUM SERPL-SCNC: 4.2 MMOL/L (ref 3.5–5.3)
PROT SERPL-MCNC: 6.7 G/DL (ref 6.4–8.4)
RBC # BLD AUTO: 3.42 MILLION/UL (ref 3.81–5.12)
SODIUM SERPL-SCNC: 143 MMOL/L (ref 135–147)
T4 FREE SERPL-MCNC: 1.01 NG/DL (ref 0.61–1.12)
TSH SERPL DL<=0.05 MIU/L-ACNC: <0.01 UIU/ML (ref 0.45–4.5)
WBC # BLD AUTO: 4.26 THOUSAND/UL (ref 4.31–10.16)

## 2024-10-22 PROCEDURE — 86900 BLOOD TYPING SEROLOGIC ABO: CPT | Performed by: OBSTETRICS & GYNECOLOGY

## 2024-10-22 PROCEDURE — 80053 COMPREHEN METABOLIC PANEL: CPT

## 2024-10-22 PROCEDURE — 83690 ASSAY OF LIPASE: CPT

## 2024-10-22 PROCEDURE — 36415 COLL VENOUS BLD VENIPUNCTURE: CPT

## 2024-10-22 PROCEDURE — 83735 ASSAY OF MAGNESIUM: CPT

## 2024-10-22 PROCEDURE — 86304 IMMUNOASSAY TUMOR CA 125: CPT

## 2024-10-22 PROCEDURE — 86850 RBC ANTIBODY SCREEN: CPT | Performed by: OBSTETRICS & GYNECOLOGY

## 2024-10-22 PROCEDURE — 84439 ASSAY OF FREE THYROXINE: CPT

## 2024-10-22 PROCEDURE — 83036 HEMOGLOBIN GLYCOSYLATED A1C: CPT

## 2024-10-22 PROCEDURE — 82150 ASSAY OF AMYLASE: CPT

## 2024-10-22 PROCEDURE — 86901 BLOOD TYPING SEROLOGIC RH(D): CPT | Performed by: OBSTETRICS & GYNECOLOGY

## 2024-10-22 PROCEDURE — 86923 COMPATIBILITY TEST ELECTRIC: CPT

## 2024-10-22 PROCEDURE — 85025 COMPLETE CBC W/AUTO DIFF WBC: CPT

## 2024-10-22 PROCEDURE — 84443 ASSAY THYROID STIM HORMONE: CPT

## 2024-10-23 ENCOUNTER — LAB REQUISITION (OUTPATIENT)
Dept: LAB | Facility: HOSPITAL | Age: 74
End: 2024-10-23
Payer: MEDICARE

## 2024-10-23 ENCOUNTER — HOSPITAL ENCOUNTER (OUTPATIENT)
Dept: INFUSION CENTER | Facility: HOSPITAL | Age: 74
End: 2024-10-23
Attending: OBSTETRICS & GYNECOLOGY

## 2024-10-23 DIAGNOSIS — Z01.818 ENCOUNTER FOR OTHER PREPROCEDURAL EXAMINATION: ICD-10-CM

## 2024-10-23 NOTE — TELEPHONE ENCOUNTER
Patient calling with numerous surgery instruction questions. Patient overwhelmed and frustrated and hung up on this triager. Will send to surgery scheduler to see if she can reach out to patient in a little while to go over her instructions again.

## 2024-10-23 NOTE — TELEPHONE ENCOUNTER
Phoned patient to discuss her questions/concerns.  Revisited all of her questions, at the completion of our conversation patient was comfortable with the outcome.  [employer] patient to call me directly with any further pre-op questions.  Call back number provided to patient.

## 2024-10-24 ENCOUNTER — NURSE TRIAGE (OUTPATIENT)
Age: 74
End: 2024-10-24

## 2024-10-24 NOTE — TELEPHONE ENCOUNTER
Spoke with Nallely chow to continue oral benadryl and to also apply hydrocortisone cream directly to rash.  All questions answered.

## 2024-10-24 NOTE — TELEPHONE ENCOUNTER
This is likely immunotherapy related skin rash. I would recommend addition of topical hydrocortisone cream and to continue oral benadryl. Please have her call if it progresses and we could always consider medrol dose-watson, although I don't feel this will be necessary. Thanks!

## 2024-10-24 NOTE — TELEPHONE ENCOUNTER
"Patient concerned she has to have her surgery canceled because she has small dots that itch on left leg by the ankle as well as some \"blotching\" on upper thigh on left side Describes as \"pimples\". Starting on Monday. No fever. Is not aware of having exposure to weeds. Patient otherwise feels well. Does not have the ability to upload photo in [facility] [employer]. Patient also reports that her daughter came over and provided her with 25mg of Benadryl which patient just took. Did not apply any topical ointment or cream. Patient would like advice if she should cancel her surgery or what she should do. Surgery planned for 5 days from now. Please advise.     Reason for Disposition   Mild localized rash    Answer Assessment - Initial Assessment Questions  1. APPEARANCE of RASH: \"Describe the rash.\"       Pimple-like appearance   2. LOCATION: \"Where is the rash located?\"       Left leg only   3. NUMBER: \"How many spots are there?\"       Several   4. SIZE: \"How big are the spots?\" (Inches, centimeters or compare to size of a coin)   Size of top of pin.     5. ONSET: \"When did the rash start?\"       Starting on Monday   6. ITCHING: \"Does the rash itch?\" If Yes, ask: \"How bad is the itch?\"  (Scale 0-10; or none, mild, moderate, severe)      Yes. 5/10 itching.   7. PAIN: \"Does the rash hurt?\" If Yes, ask: \"How bad is the pain?\"  (Scale 0-10; or none, mild, moderate, severe)      No pain.   8. OTHER SYMPTOMS: \"Do you have any other symptoms?\" (e.g., fever)      No other symptoms.    Protocols used: Rash or Redness - Localized-Adult-[place]    "

## 2024-10-25 ENCOUNTER — ANESTHESIA EVENT (OUTPATIENT)
Dept: PERIOP | Facility: HOSPITAL | Age: 74
DRG: 740 | End: 2024-10-25
Payer: MEDICARE

## 2024-10-25 DIAGNOSIS — C54.1 ENDOMETRIAL CANCER (HCC): Primary | ICD-10-CM

## 2024-10-25 DIAGNOSIS — Z78.9 DEEP VEIN THROMBOSIS (DVT) PROPHYLAXIS PRESCRIBED AT DISCHARGE: ICD-10-CM

## 2024-10-25 NOTE — PRE-PROCEDURE INSTRUCTIONS
Pre-Surgery Instructions:   Medication Instructions    amLODIPine (NORVASC) 10 mg tablet Take day of surgery.    calcium carbonate (Calcium 600) 600 MG tablet Stop taking 7 days prior to surgery.    diphenhydrAMINE HCl (BENADRYL PO) Hold day of surgery.    methimazole (TAPAZOLE) 5 mg tablet Take day of surgery.    Multiple Vitamin (MULTIVITAMIN ADULT PO) Stop taking 7 days prior to surgery.    simvastatin (ZOCOR) 10 mg tablet Take night before surgery   Medication instructions for day surgery reviewed. Please use only a sip of water to take your instructed medications. Avoid all over the counter vitamins, supplements and NSAIDS for one week prior to surgery per anesthesia guidelines. Tylenol is ok to take as needed.     You will receive a call one business day prior to surgery with an arrival time and hospital directions. If your surgery is scheduled on a Monday, the hospital will be calling you on the Friday prior to your surgery. If you have not heard from anyone by 8pm, please call the hospital supervisor through the hospital  at [phone]. ([place] [phone] or [place] [phone]).    Do not eat or drink anything after midnight the night before your surgery, including candy, mints, lifesavers, or chewing gum. Do not drink alcohol 24hrs before your surgery. Try not to smoke at least 24hrs before your surgery.       Follow the pre surgery showering instructions as listed in the “My Surgical Experience Booklet” or otherwise provided by your surgeon's office. Do not use a blade to shave the surgical area 1 week before surgery. It is okay to use a clean electric clippers up to 24 hours before surgery. Do not apply any lotions, creams, including makeup, cologne, deodorant, or perfumes after showering on the day of your surgery. Do not use dry shampoo, hair spray, hair gel, or any type of hair products.     No contact lenses, eye make-up, or artificial eyelashes. Remove nail polish, including gel  polish, and any artificial, gel, or acrylic nails if possible. Remove all jewelry including rings and body piercing jewelry.     Wear causal clothing that is easy to take on and off. Consider your type of surgery.    Keep any valuables, jewelry, piercings at home. Please bring any specially ordered equipment (sling, braces) if indicated.    Arrange for a responsible person to drive you to and from the hospital on the day of your surgery. Please confirm the visitor policy for the day of your procedure when you receive your phone call with an arrival time.     Call the surgeon's office with any new illnesses, exposures, or additional questions prior to surgery.    Please reference your “My Surgical Experience Booklet” for additional information to prepare for your upcoming surgery.

## 2024-10-25 NOTE — ANESTHESIA PREPROCEDURE EVALUATION
Procedure:  EXAM UNDER ANESTHESIA (EUA) (Vagina )  ROBOTIC TOTAL LAPAROSCOPIC HYSTERECTOMY, BILATERAL SALPINGO-OOPHERECTOMY, LYMPH NODE DISSECTION, EXPLORATORY LAPAROTOMY AND ALL OTHER INDICATED PROCEDURES (Abdomen)    Relevant Problems   CARDIO   (+) Essential (primary) hypertension   (+) Heart murmur   (+) Mixed hyperlipidemia   (+) Moderate aortic stenosis      ENDO   (+) Hyperthyroidism      GI/HEPATIC   (+) Lower gastrointestinal hemorrhage      GYN   (+) Endometrial cancer (HCC)      Recent COVID infection early October. Finished paxlovid course on 10/15. Admits to be asymptomatic (phone call interview with SOC nurse)  Physical Exam    Airway    Mallampati score: II  TM Distance: >3 FB  Neck ROM: full     Dental       Cardiovascular      Pulmonary      Other Findings  post-pubertal.      Anesthesia Plan  ASA Score- 3     Anesthesia Type- general with ASA Monitors.         Additional Monitors:     Airway Plan: ETT.    Comment: ETT, 2pIVs, Pre-op Thoracic Epidural.       Plan Factors-Exercise tolerance (METS): >4 METS.    Chart reviewed. EKG reviewed. Imaging results reviewed. Existing labs reviewed. Patient summary reviewed.    Patient is not a current smoker.              Induction- intravenous.    Postoperative Plan- Plan for postoperative opioid use. Planned trial extubation    Perioperative Resuscitation Plan - Level 1 - Full Code.       Informed Consent- Anesthetic plan and risks discussed with patient and daughter.  I personally reviewed this patient with the CRNA. Discussed and agreed on the Anesthesia Plan with the CRNA..

## 2024-10-25 NOTE — PRE-PROCEDURE INSTRUCTIONS
Pre-Surgery Instructions:   Medication Instructions    amLODIPine (NORVASC) 10 mg tablet Take day of surgery.    calcium carbonate (Calcium 600) 600 MG tablet Stop taking 7 days prior to surgery.    methimazole (TAPAZOLE) 5 mg tablet Take day of surgery.    Multiple Vitamin (MULTIVITAMIN ADULT PO) Stop taking 7 days prior to surgery.    simvastatin (ZOCOR) 10 mg tablet Take night before surgery   Medication instructions for day surgery reviewed. Please use only a sip of water to take your instructed medications. Avoid all over the counter vitamins, supplements and NSAIDS for one week prior to surgery per anesthesia guidelines. Tylenol is ok to take as needed.     You will receive a call one business day prior to surgery with an arrival time and hospital directions. If your surgery is scheduled on a Monday, the hospital will be calling you on the Friday prior to your surgery. If you have not heard from anyone by 8pm, please call the hospital supervisor through the hospital  at [phone]. ([facility] [phone] or [place] [phone]).    Do not eat or drink anything after midnight the night before your surgery, including candy, mints, lifesavers, or chewing gum. Do not drink alcohol 24hrs before your surgery. Try not to smoke at least 24hrs before your surgery.       Follow the pre surgery showering instructions as listed in the “My Surgical Experience Booklet” or otherwise provided by your surgeon's office. Do not use a blade to shave the surgical area 1 week before surgery. It is okay to use a clean electric clippers up to 24 hours before surgery. Do not apply any lotions, creams, including makeup, cologne, deodorant, or perfumes after showering on the day of your surgery. Do not use dry shampoo, hair spray, hair gel, or any type of hair products.     No contact lenses, eye make-up, or artificial eyelashes. Remove nail polish, including gel polish, and any artificial, gel, or acrylic nails if possible.  Remove all jewelry including rings and body piercing jewelry.     Wear causal clothing that is easy to take on and off. Consider your type of surgery.    Keep any valuables, jewelry, piercings at home. Please bring any specially ordered equipment (sling, braces) if indicated.    Arrange for a responsible person to drive you to and from the hospital on the day of your surgery. Please confirm the visitor policy for the day of your procedure when you receive your phone call with an arrival time.     Call the surgeon's office with any new illnesses, exposures, or additional questions prior to surgery.    Please reference your “My Surgical Experience Booklet” for additional information to prepare for your upcoming surgery.

## 2024-10-29 ENCOUNTER — ANESTHESIA (OUTPATIENT)
Dept: PERIOP | Facility: HOSPITAL | Age: 74
DRG: 740 | End: 2024-10-29
Payer: MEDICARE

## 2024-10-29 ENCOUNTER — HOSPITAL ENCOUNTER (INPATIENT)
Facility: HOSPITAL | Age: 74
LOS: 1 days | Discharge: HOME | DRG: 740 | End: 2024-10-31
Attending: OBSTETRICS & GYNECOLOGY | Admitting: OBSTETRICS & GYNECOLOGY
Payer: MEDICARE

## 2024-10-29 DIAGNOSIS — C54.1 ENDOMETRIAL CANCER (HCC): ICD-10-CM

## 2024-10-29 DIAGNOSIS — E05.90 HYPERTHYROIDISM: ICD-10-CM

## 2024-10-29 DIAGNOSIS — I10 ESSENTIAL (PRIMARY) HYPERTENSION: ICD-10-CM

## 2024-10-29 DIAGNOSIS — Z90.710 STATUS POST HYSTERECTOMY: Primary | ICD-10-CM

## 2024-10-29 LAB
ABO GROUP BLD: NORMAL
ANION GAP SERPL CALCULATED.3IONS-SCNC: 7 MMOL/L (ref 4–13)
BLD GP AB SCN SERPL QL: NEGATIVE
BUN SERPL-MCNC: 12 MG/DL (ref 5–25)
CALCIUM SERPL-MCNC: 8.2 MG/DL (ref 8.4–10.2)
CHLORIDE SERPL-SCNC: 108 MMOL/L (ref 96–108)
CO2 SERPL-SCNC: 26 MMOL/L (ref 21–32)
CREAT SERPL-MCNC: 0.73 MG/DL (ref 0.6–1.3)
ERYTHROCYTE [DISTWIDTH] IN BLOOD BY AUTOMATED COUNT: 16.5 % (ref 11.6–15.1)
GFR SERPL CREATININE-BSD FRML MDRD: 80 ML/MIN/1.73SQ M
GLUCOSE SERPL-MCNC: 164 MG/DL (ref 65–140)
GLUCOSE SERPL-MCNC: 175 MG/DL (ref 65–140)
HCT VFR BLD AUTO: 29.5 % (ref 34.8–46.1)
HGB BLD-MCNC: 9.3 G/DL (ref 11.5–15.4)
MAGNESIUM SERPL-MCNC: 1.8 MG/DL (ref 1.9–2.7)
MCH RBC QN AUTO: 31.4 PG (ref 26.8–34.3)
MCHC RBC AUTO-ENTMCNC: 31.5 G/DL (ref 31.4–37.4)
MCV RBC AUTO: 100 FL (ref 82–98)
PLATELET # BLD AUTO: 92 THOUSANDS/UL (ref 149–390)
PMV BLD AUTO: 9.2 FL (ref 8.9–12.7)
POTASSIUM SERPL-SCNC: 3.9 MMOL/L (ref 3.5–5.3)
RBC # BLD AUTO: 2.96 MILLION/UL (ref 3.81–5.12)
RH BLD: POSITIVE
SODIUM SERPL-SCNC: 141 MMOL/L (ref 135–147)
SPECIMEN EXPIRATION DATE: NORMAL
WBC # BLD AUTO: 5.92 THOUSAND/UL (ref 4.31–10.16)

## 2024-10-29 PROCEDURE — 0UT24ZZ RESECTION OF BILATERAL OVARIES, PERCUTANEOUS ENDOSCOPIC APPROACH: ICD-10-PCS | Performed by: OBSTETRICS & GYNECOLOGY

## 2024-10-29 PROCEDURE — 0UT94ZZ RESECTION OF UTERUS, PERCUTANEOUS ENDOSCOPIC APPROACH: ICD-10-PCS | Performed by: OBSTETRICS & GYNECOLOGY

## 2024-10-29 PROCEDURE — 86900 BLOOD TYPING SEROLOGIC ABO: CPT | Performed by: OBSTETRICS & GYNECOLOGY

## 2024-10-29 PROCEDURE — 07BD4ZX EXCISION OF AORTIC LYMPHATIC, PERCUTANEOUS ENDOSCOPIC APPROACH, DIAGNOSTIC: ICD-10-PCS | Performed by: OBSTETRICS & GYNECOLOGY

## 2024-10-29 PROCEDURE — 86901 BLOOD TYPING SEROLOGIC RH(D): CPT | Performed by: OBSTETRICS & GYNECOLOGY

## 2024-10-29 PROCEDURE — 85027 COMPLETE CBC AUTOMATED: CPT | Performed by: STUDENT IN AN ORGANIZED HEALTH CARE EDUCATION/TRAINING PROGRAM

## 2024-10-29 PROCEDURE — 86850 RBC ANTIBODY SCREEN: CPT | Performed by: OBSTETRICS & GYNECOLOGY

## 2024-10-29 PROCEDURE — 82948 REAGENT STRIP/BLOOD GLUCOSE: CPT

## 2024-10-29 PROCEDURE — 83735 ASSAY OF MAGNESIUM: CPT | Performed by: STUDENT IN AN ORGANIZED HEALTH CARE EDUCATION/TRAINING PROGRAM

## 2024-10-29 PROCEDURE — 80048 BASIC METABOLIC PNL TOTAL CA: CPT | Performed by: STUDENT IN AN ORGANIZED HEALTH CARE EDUCATION/TRAINING PROGRAM

## 2024-10-29 PROCEDURE — 0UT74ZZ RESECTION OF BILATERAL FALLOPIAN TUBES, PERCUTANEOUS ENDOSCOPIC APPROACH: ICD-10-PCS | Performed by: OBSTETRICS & GYNECOLOGY

## 2024-10-29 PROCEDURE — NC001 PR NO CHARGE: Performed by: PHYSICIAN ASSISTANT

## 2024-10-29 PROCEDURE — 8E0W4CZ ROBOTIC ASSISTED PROCEDURE OF TRUNK REGION, PERCUTANEOUS ENDOSCOPIC APPROACH: ICD-10-PCS | Performed by: OBSTETRICS & GYNECOLOGY

## 2024-10-29 RX ORDER — LIDOCAINE HYDROCHLORIDE 10 MG/ML
INJECTION, SOLUTION EPIDURAL; INFILTRATION; INTRACAUDAL; PERINEURAL AS NEEDED
Status: DISCONTINUED | OUTPATIENT
Start: 2024-10-29 | End: 2024-10-29

## 2024-10-29 RX ORDER — ROPIVACAINE HYDROCHLORIDE 2 MG/ML
INJECTION, SOLUTION EPIDURAL; INFILTRATION; PERINEURAL CONTINUOUS
Status: DISCONTINUED | OUTPATIENT
Start: 2024-10-29 | End: 2024-10-30

## 2024-10-29 RX ORDER — HYDROMORPHONE HCL IN WATER/PF 6 MG/30 ML
0.2 PATIENT CONTROLLED ANALGESIA SYRINGE INTRAVENOUS ONCE AS NEEDED
Status: DISCONTINUED | OUTPATIENT
Start: 2024-10-29 | End: 2024-10-29 | Stop reason: HOSPADM

## 2024-10-29 RX ORDER — ONDANSETRON 2 MG/ML
INJECTION INTRAMUSCULAR; INTRAVENOUS AS NEEDED
Status: DISCONTINUED | OUTPATIENT
Start: 2024-10-29 | End: 2024-10-29

## 2024-10-29 RX ORDER — FENTANYL CITRATE 50 UG/ML
INJECTION, SOLUTION INTRAMUSCULAR; INTRAVENOUS AS NEEDED
Status: DISCONTINUED | OUTPATIENT
Start: 2024-10-29 | End: 2024-10-29

## 2024-10-29 RX ORDER — ONDANSETRON 2 MG/ML
4 INJECTION INTRAMUSCULAR; INTRAVENOUS EVERY 6 HOURS PRN
Status: DISCONTINUED | OUTPATIENT
Start: 2024-10-29 | End: 2024-10-31 | Stop reason: HOSPADM

## 2024-10-29 RX ORDER — DOCUSATE SODIUM 100 MG/1
100 CAPSULE, LIQUID FILLED ORAL 2 TIMES DAILY
Status: DISCONTINUED | OUTPATIENT
Start: 2024-10-29 | End: 2024-10-31 | Stop reason: HOSPADM

## 2024-10-29 RX ORDER — PROPOFOL 10 MG/ML
INJECTION, EMULSION INTRAVENOUS AS NEEDED
Status: DISCONTINUED | OUTPATIENT
Start: 2024-10-29 | End: 2024-10-29

## 2024-10-29 RX ORDER — CEFAZOLIN SODIUM 1 G/50ML
1000 SOLUTION INTRAVENOUS ONCE
Status: COMPLETED | OUTPATIENT
Start: 2024-10-29 | End: 2024-10-29

## 2024-10-29 RX ORDER — OXYCODONE HYDROCHLORIDE 5 MG/1
5 TABLET ORAL EVERY 4 HOURS PRN
Status: DISCONTINUED | OUTPATIENT
Start: 2024-10-29 | End: 2024-10-29

## 2024-10-29 RX ORDER — SODIUM CHLORIDE, SODIUM LACTATE, POTASSIUM CHLORIDE, CALCIUM CHLORIDE 600; 310; 30; 20 MG/100ML; MG/100ML; MG/100ML; MG/100ML
INJECTION, SOLUTION INTRAVENOUS CONTINUOUS PRN
Status: DISCONTINUED | OUTPATIENT
Start: 2024-10-29 | End: 2024-10-29

## 2024-10-29 RX ORDER — SODIUM CHLORIDE 9 MG/ML
INJECTION, SOLUTION INTRAVENOUS CONTINUOUS PRN
Status: DISCONTINUED | OUTPATIENT
Start: 2024-10-29 | End: 2024-10-29

## 2024-10-29 RX ORDER — MAGNESIUM SULFATE HEPTAHYDRATE 40 MG/ML
2 INJECTION, SOLUTION INTRAVENOUS ONCE
Status: COMPLETED | OUTPATIENT
Start: 2024-10-29 | End: 2024-10-30

## 2024-10-29 RX ORDER — HEPARIN SODIUM 5000 [USP'U]/ML
5000 INJECTION, SOLUTION INTRAVENOUS; SUBCUTANEOUS
Status: COMPLETED | OUTPATIENT
Start: 2024-10-29 | End: 2024-10-29

## 2024-10-29 RX ORDER — HYDROMORPHONE HCL IN WATER/PF 6 MG/30 ML
0.2 PATIENT CONTROLLED ANALGESIA SYRINGE INTRAVENOUS
Status: DISCONTINUED | OUTPATIENT
Start: 2024-10-29 | End: 2024-10-30

## 2024-10-29 RX ORDER — HEPARIN SODIUM 5000 [USP'U]/ML
5000 INJECTION, SOLUTION INTRAVENOUS; SUBCUTANEOUS EVERY 8 HOURS SCHEDULED
Status: DISCONTINUED | OUTPATIENT
Start: 2024-10-30 | End: 2024-10-30

## 2024-10-29 RX ORDER — SODIUM CHLORIDE, SODIUM LACTATE, POTASSIUM CHLORIDE, CALCIUM CHLORIDE 600; 310; 30; 20 MG/100ML; MG/100ML; MG/100ML; MG/100ML
75 INJECTION, SOLUTION INTRAVENOUS CONTINUOUS
Status: DISCONTINUED | OUTPATIENT
Start: 2024-10-29 | End: 2024-10-30

## 2024-10-29 RX ORDER — OXYCODONE HYDROCHLORIDE 5 MG/1
2.5 TABLET ORAL EVERY 4 HOURS PRN
Status: DISCONTINUED | OUTPATIENT
Start: 2024-10-29 | End: 2024-10-29

## 2024-10-29 RX ORDER — ONDANSETRON 2 MG/ML
4 INJECTION INTRAMUSCULAR; INTRAVENOUS ONCE AS NEEDED
Status: DISCONTINUED | OUTPATIENT
Start: 2024-10-29 | End: 2024-10-29 | Stop reason: HOSPADM

## 2024-10-29 RX ORDER — METHIMAZOLE 5 MG/1
5 TABLET ORAL 3 TIMES DAILY
Status: DISCONTINUED | OUTPATIENT
Start: 2024-10-29 | End: 2024-10-31 | Stop reason: HOSPADM

## 2024-10-29 RX ORDER — FENTANYL CITRATE/PF 50 MCG/ML
25 SYRINGE (ML) INJECTION
Status: DISCONTINUED | OUTPATIENT
Start: 2024-10-29 | End: 2024-10-29 | Stop reason: HOSPADM

## 2024-10-29 RX ORDER — DEXAMETHASONE SODIUM PHOSPHATE 10 MG/ML
INJECTION, SOLUTION INTRAMUSCULAR; INTRAVENOUS AS NEEDED
Status: DISCONTINUED | OUTPATIENT
Start: 2024-10-29 | End: 2024-10-29

## 2024-10-29 RX ORDER — BUPIVACAINE HYDROCHLORIDE 2.5 MG/ML
INJECTION, SOLUTION EPIDURAL; INFILTRATION; INTRACAUDAL AS NEEDED
Status: DISCONTINUED | OUTPATIENT
Start: 2024-10-29 | End: 2024-10-29 | Stop reason: HOSPADM

## 2024-10-29 RX ORDER — ROCURONIUM BROMIDE 10 MG/ML
INJECTION, SOLUTION INTRAVENOUS AS NEEDED
Status: DISCONTINUED | OUTPATIENT
Start: 2024-10-29 | End: 2024-10-29

## 2024-10-29 RX ORDER — ACETAMINOPHEN 325 MG/1
975 TABLET ORAL ONCE
Status: COMPLETED | OUTPATIENT
Start: 2024-10-29 | End: 2024-10-29

## 2024-10-29 RX ORDER — SODIUM CHLORIDE, SODIUM LACTATE, POTASSIUM CHLORIDE, CALCIUM CHLORIDE 600; 310; 30; 20 MG/100ML; MG/100ML; MG/100ML; MG/100ML
125 INJECTION, SOLUTION INTRAVENOUS CONTINUOUS
Status: DISCONTINUED | OUTPATIENT
Start: 2024-10-29 | End: 2024-10-29

## 2024-10-29 RX ORDER — METRONIDAZOLE 500 MG/100ML
500 INJECTION, SOLUTION INTRAVENOUS ONCE
Status: COMPLETED | OUTPATIENT
Start: 2024-10-29 | End: 2024-10-29

## 2024-10-29 RX ADMIN — PROPOFOL 120 MG: 10 INJECTION, EMULSION INTRAVENOUS at 11:43

## 2024-10-29 RX ADMIN — ROPIVACAINE HYDROCHLORIDE: 2 INJECTION, SOLUTION EPIDURAL; INFILTRATION at 16:30

## 2024-10-29 RX ADMIN — MAGNESIUM SULFATE HEPTAHYDRATE 2 G: 2 INJECTION, SOLUTION INTRAVENOUS at 22:11

## 2024-10-29 RX ADMIN — METRONIDAZOLE: 500 SOLUTION INTRAVENOUS at 12:03

## 2024-10-29 RX ADMIN — ONDANSETRON 4 MG: 2 INJECTION INTRAMUSCULAR; INTRAVENOUS at 14:53

## 2024-10-29 RX ADMIN — SODIUM CHLORIDE, SODIUM LACTATE, POTASSIUM CHLORIDE, AND CALCIUM CHLORIDE 125 ML/HR: .6; .31; .03; .02 INJECTION, SOLUTION INTRAVENOUS at 17:38

## 2024-10-29 RX ADMIN — ROCURONIUM BROMIDE 50 MG: 10 INJECTION, SOLUTION INTRAVENOUS at 11:43

## 2024-10-29 RX ADMIN — SODIUM CHLORIDE: 0.9 INJECTION, SOLUTION INTRAVENOUS at 11:47

## 2024-10-29 RX ADMIN — FENTANYL CITRATE 50 MCG: 50 INJECTION INTRAMUSCULAR; INTRAVENOUS at 11:12

## 2024-10-29 RX ADMIN — ROCURONIUM BROMIDE 20 MG: 10 INJECTION, SOLUTION INTRAVENOUS at 12:45

## 2024-10-29 RX ADMIN — FENTANYL CITRATE 25 MCG: 50 INJECTION INTRAMUSCULAR; INTRAVENOUS at 16:17

## 2024-10-29 RX ADMIN — SUGAMMADEX 120 MG: 100 INJECTION, SOLUTION INTRAVENOUS at 15:38

## 2024-10-29 RX ADMIN — FENTANYL CITRATE 50 MCG: 50 INJECTION INTRAMUSCULAR; INTRAVENOUS at 11:43

## 2024-10-29 RX ADMIN — DOCUSATE SODIUM 100 MG: 100 CAPSULE, LIQUID FILLED ORAL at 18:21

## 2024-10-29 RX ADMIN — PHENYLEPHRINE HYDROCHLORIDE 20 MCG/MIN: 10 INJECTION INTRAVENOUS at 12:38

## 2024-10-29 RX ADMIN — DEXAMETHASONE SODIUM PHOSPHATE 10 MG: 10 INJECTION, SOLUTION INTRAMUSCULAR; INTRAVENOUS at 11:46

## 2024-10-29 RX ADMIN — ACETAMINOPHEN 975 MG: 325 TABLET ORAL at 09:20

## 2024-10-29 RX ADMIN — ROCURONIUM BROMIDE 10 MG: 10 INJECTION, SOLUTION INTRAVENOUS at 14:53

## 2024-10-29 RX ADMIN — HEPARIN SODIUM 5000 UNITS: 5000 INJECTION, SOLUTION INTRAVENOUS; SUBCUTANEOUS at 11:55

## 2024-10-29 RX ADMIN — SODIUM CHLORIDE, SODIUM LACTATE, POTASSIUM CHLORIDE, AND CALCIUM CHLORIDE 125 ML/HR: .6; .31; .03; .02 INJECTION, SOLUTION INTRAVENOUS at 09:49

## 2024-10-29 RX ADMIN — FENTANYL CITRATE 50 MCG: 50 INJECTION INTRAMUSCULAR; INTRAVENOUS at 12:12

## 2024-10-29 RX ADMIN — HYDROMORPHONE HYDROCHLORIDE 0.2 MG: 0.2 INJECTION, SOLUTION INTRAMUSCULAR; INTRAVENOUS; SUBCUTANEOUS at 18:22

## 2024-10-29 RX ADMIN — LIDOCAINE HYDROCHLORIDE 50 MG: 10 INJECTION, SOLUTION EPIDURAL; INFILTRATION; INTRACAUDAL; PERINEURAL at 11:43

## 2024-10-29 RX ADMIN — CEFAZOLIN SODIUM 1000 MG: 1 SOLUTION INTRAVENOUS at 11:56

## 2024-10-29 RX ADMIN — ROCURONIUM BROMIDE 10 MG: 10 INJECTION, SOLUTION INTRAVENOUS at 13:49

## 2024-10-29 RX ADMIN — METHIMAZOLE 5 MG: 5 TABLET ORAL at 22:09

## 2024-10-29 NOTE — DISCHARGE INSTR - AVS FIRST PAGE
[facility] - Gynecologic Oncology  Camilo Marquez, Kelly and Nima  [phone]    Please take the following medications:   Alternate tylenol, motrin  Oxycodone (prescription provided) as needed every 4 hours for severe pain   Please take Eliquis 2.5mg twice daily to prevent blood clots from developing after surgery.  If you are taking oxycodone, please take a stool softener or Miralax as needed for constipation    2. Activity restrictions:  No heavy lifting > 10 lbs.    Nothing in the vagina until your follow-up visit  No tub baths or swimming  Walking is encouraged. Take care when going up and down stairs.    3. You may shower. You can use soapy water surrounding the incision and let the water run over it. Pat the incision dry after your shower.     4. You have surgical glue over your incision which will dissolve on its own.     5. If you have heavy vaginal bleeding soaking through more than 1 pad per hour, fever with temperature > 100.4F or 38C, nausea/vomiting, severe abdominal pain, or notice redness or drainage from your incision, please call the office.     6. You have a follow-up appointment listed below.

## 2024-10-29 NOTE — QUICK NOTE
Nallely Peacock  [id number]  10/29/2024  5:01 PM    POST-OP CHECK    SUBJECTIVE:  Nallely Peacock is doing well. Pain well controlled with epidural. No complaints at this time, patient still lethargic from anesthesia.    OBJECTIVE:  Vitals:    10/29/24 1700   BP: 122/58   Pulse: 81   Resp: 15   Temp:    SpO2: 100%         OBGyn Exam  GEN: The patient was alert and oriented x3, pleasant well-appearing female in no acute distress.   CV: Regular rate  PULM: nonlabored respirations  MSK: Normal gait  Skin: warm, dry  Neuro: no focal deficits  Psych: normal affect and judgement, cooperative  ABD: soft, nontender    ASSESSMENT:  Nallely Peacock is s/p RATLH, BSO, PALND, suboptimal debulking and recovering in PACU    PLAN:  Routine postop check  IV/PO pain meds as needed with epidural in place  Continue regular diet  Antiemetics for nausea/vomiting prn  Follow up am labs  Adequate UOP, continue to monitor with shore  SCDs and holding SQH for DVT ppx  Hailey Webb MD  OBGYN Resident  10/29/2024  5:01 PM

## 2024-10-29 NOTE — INTERVAL H&P NOTE
H&P reviewed. After examining the patient I find no changes in the patients condition since the H&P had been written.  Heart with systolic murmur, reg rhythm. Lungs CTAB.     Vitals:    10/29/24 0859   BP: 117/75   Pulse: 70   Resp: 18   Temp: 98 °F (36.7 °C)   SpO2: 98%

## 2024-10-29 NOTE — DISCHARGE INSTRUCTIONS
[facility] - Gynecologic Oncology  Camilo Marquez Graul and Kelly  [phone]    Hysterectomy Discharge Instructions    WHAT YOU NEED TO KNOW:   A hysterectomy is surgery to remove your uterus. Your ovaries, fallopian tubes, cervix, or part of your vagina may also need to be removed. The organs and tissue that will be removed depends on your medical condition.  After a hysterectomy, you will not be able to become pregnant.  If your ovaries are removed, you will go through menopause if you have not already.    DISCHARGE INSTRUCTIONS:   Contact your doctor at the number above if:   You have a fever over 101o.  You have nausea or are vomiting that does not improve after a light meal.   Your pain is getting worse, even after you take medicine.   You feel pain or burning when you urinate, or you have trouble urinating.   You have pus or a foul-smelling odor coming from your vagina.    Your wound is red, swollen, or draining pus.  You see new or an increased amount of bright red blood coming from your vagina or your incisions.   You have questions or concerns about your condition or care.    Seek care immediately:   Your arm or leg feels warm, tender, and painful. It may look swollen and red.  You have increasing abdominal or pelvic pain.   You have heavy vaginal bleeding that fills 1 or more sanitary pads in 1 hour.    Call 911 for any of the following:   You feel lightheaded, short of breath, and have chest pain.   You cough up blood.    Medicines: You may need any of the following:  Prescription medicine may be given. You may receive a prescription for pain medication or be advised to use over the counter (OTC) pain medication such as acetaminophen (Tylenol) or ibuprofen (Advil, Motrin). Ask your healthcare provider how to take this medicine safely.  NSAIDs , such as ibuprofen, help decrease swelling, pain, and fever. NSAIDs can cause stomach bleeding or kidney problems in certain  people. If you take blood thinner medicine, always ask your healthcare provider if NSAIDs are safe for you. Always read the medicine label and follow directions.   Stool softeners help treat or prevent constipation.    Take your medicine as directed. Contact your healthcare provider if you think your medicine is not helping or if you have side effects. Tell him or her if you are allergic to any medicine. Keep a list of the medicines, vitamins, and herbs you take. Include the amounts, and when and why you take them. Bring the list or the pill bottles to follow-up visits. Carry your medicine list with you in case of an emergency.    Activity:   Rest as needed. Get up and move around as directed to help prevent blood clots. Start with short walks and slowly increase the distance every day. Limit the number of times you climb stairs to 2 times each day for the first week. Plan most of your daily activities on one level of your home.      Do not lift objects heavier than 10 pounds for 6 weeks. Avoid strenuous activity for 2 weeks.      Do not strain during bowel movements. High-fiber foods and extra liquids can help you prevent constipation. Examples of high-fiber foods are fruit and bran. Prune juice and water are good liquids to drink.      Do not have sex, use tampons, or douche for up to 8 weeks.     You may shower as soon as the day after surgery.  Tub baths can be taken starting 2 weeks after surgery.Do not go into pools or hot tubs until cleared by your doctor.      Ask when it is safe for you to drive. It is generally safe to drive after 2 weeks and when no longer taking prescription pain medication.    Ask when you may return to work and to other regular activities.    Wound care: Care for your abdominal incisions as directed. Carefully wash around the wound with soap and water. If you have Hibiclens or medicated soap that you were instructed to use before surgery, you may use that to wash with for up to 2 days  after surgery.  If not, any mild non-scented, non-abrasive soap is safe.  It is okay to let the soap and water run over your incision. Do not scrub your incision. Dry the area and put on new, clean bandages as directed. Change your bandages when they get wet or dirty. If you have strips of medical tape, let them fall off on their own. It may take 7 to 14 days for them to fall off. Check your incision every day for redness, swelling, or pus.   Deep breathing: Take deep breaths and cough 10 times each hour. This will decrease your risk for a lung infection. Take a deep breath and hold it for as long as you can. Let the air out and then cough strongly. Deep breaths help open your airway. You may be given an incentive spirometer to help you take deep breaths. Put the plastic piece in your mouth and take a slow, deep breath, then let the air out and cough. Repeat these steps 10 times every hour.   Get support: This surgery may be life-changing for you and your family. You will no longer be able to get pregnant. Sudden changes in the levels of your hormones may occur and cause mood swings and depression. You may feel angry, sad, or frightened, or cry frequently and unexpectedly. These feelings are normal. Talk to your healthcare provider about where you can get support. You can also ask if hormone replacement medicine is right for you.   Follow up with your healthcare provider or gynecologist as directed: You may need to return to have stitches removed, and for other tests. Write down your questions so you remember to ask them during your visits.      © 2017 [employer] Information is for End User's use only and may not be sold, redistributed or otherwise used for commercial purposes. All illustrations and images included in CareNotes® are the copyrighted property of [employer] or [employer].  The above information is an  only. It is not intended as medical advice for  individual conditions or treatments. Talk to your doctor, nurse or pharmacist before following any medical regimen to see if it is safe and effective for you.

## 2024-10-29 NOTE — QUICK NOTE
Patient undergoing debulking procedure with Dr. Jacome for endometrial cancer    Robotic approach    During lymph node dissection of the case, Dr. Jacome was dissecting out a lymph node in the right retroperitoneal region.  He asked for my help to help identify some anatomy to make sure he was avoiding major hilar structures.    I looked through the CT scans.  The lymph node in question is up against the right psoas but well below the right renal hilum.    During the case, Dr. Jacome had already identified the ureter and the right gonadal vein, which I agreed with.    The lymph node in question was able to be teased off psoas in an inferior direction traveling the top of the node was already well inferior to the area of the presumed hilum and the dissection was taken down further inferiorly, further away from the hilum.  I agreed with Dr. Jacome that he was far away from crucial hilar structures and that he should continue with this lymph node dissection.    I told him that if I could be of any other assistance for the rest of the case, he could call me back and if need be.

## 2024-10-29 NOTE — PLAN OF CARE
Problem: SAFETY ADULT  Goal: Maintains/Returns to pre admission functional level  Description: INTERVENTIONS:  - Perform AM-PAC 6 Click Basic Mobility/ Daily Activity assessment daily.  - Set and communicate daily mobility goal to care team and patient/family/caregiver.   - Collaborate with rehabilitation services on mobility goals if consulted    - Out of bed for toileting  - Record patient progress and toleration of activity level   Outcome: Progressing     Problem: PAIN - ADULT  Goal: Verbalizes/displays adequate comfort level or baseline comfort level  Description: Interventions:  - Encourage patient to monitor pain and request assistance  - Assess pain using appropriate pain scale  - Administer analgesics based on type and severity of pain and evaluate response  - Implement non-pharmacological measures as appropriate and evaluate response  - Consider cultural and social influences on pain and pain management  - Notify physician/advanced practitioner if interventions unsuccessful or patient reports new pain  Outcome: Progressing     Problem: INFECTION - ADULT  Goal: Absence or prevention of progression during hospitalization  Description: INTERVENTIONS:  - Assess and monitor for signs and symptoms of infection  - Monitor lab/diagnostic results  - Monitor all insertion sites, i.e. indwelling lines, tubes, and drains  - Monitor endotracheal if appropriate and nasal secretions for changes in amount and color  - [place] appropriate cooling/warming therapies per order  - Administer medications as ordered  - Instruct and encourage patient and family to use good hand hygiene technique  - Identify and instruct in appropriate isolation precautions for identified infection/condition  Outcome: Progressing     Problem: INFECTION - ADULT  Goal: Absence of fever/infection during neutropenic period  Description: INTERVENTIONS:  - Monitor WBC    Outcome: Progressing

## 2024-10-29 NOTE — OP NOTE
OPERATIVE REPORT  PATIENT NAME: Nallely Peacock    :  1950  MRN: [id number]  Pt Location: [facility] OR ROOM 14    SURGERY DATE: 10/29/2024    Surgeons and Role:     * Lavon Jacome MD - Primary     * Katherine Ludwig PA-C - Assisting     * Wanda Romayne Stengel Hohenshilt, PA-C - Assisting     * Hailey Webb MD - Assisting     * Fish Ruth DO - Assisting     * Mansoor Alonso MD - Fellow     * Holli Lala PA-C    Preop Diagnosis:  Endometrial cancer (HCC) [C54.1]    Post-Op Diagnosis Codes:     * Endometrial cancer (HCC) [C54.1]    Procedure(s):  ROBOTIC TOTAL LAPAROSCOPIC HYSTERECTOMY. BILATERAL SALPINGO-OOPHERECTOMY  Bilateral - BILATERAL PARAAORTIC LYMPH NODE DISSECTION. DEBULKING    Specimen(s):  ID Type Source Tests Collected by Time Destination   1 : bilateral paraaortic Tissue Lymph Node TISSUE EXAM Lavon Jacome MD 10/29/2024 12:37 PM    2 : UTERUS, CERVIX, BILATERAL FALLOPIAN TUBES AND OVARIES Tissue Uterus TISSUE EXAM Lavon Jacome MD 10/29/2024  2:55 PM        Estimated Blood Loss:   Minimal    Drains:  Urethral Catheter Double-lumen;Latex 16 Fr. (Active)   Site Assessment Clean;Skin intact 10/29/24 1557   Collection Container Standard drainage bag 10/29/24 1557   Securement Method Securing device (Describe) 10/29/24 1557   Number of days: 0       Anesthesia Type:   General w/ Epidural    Operative Indications:  Endometrial cancer (HCC) [C54.1]  74-year-old with stage III C2 high-grade endometrial cancer, bulky pelvic and periotic lymphadenopathy status post 3 cycles of neoadjuvant chemotherapy with carboplatin, Taxol, dostarlimab with some treatment response.  She presented for hysterectomy and debulking.    Operative Findings:  1.  Examination under anesthesia revealed tumor present at the cervix.  The cervix was dilated.  There was no evidence of parametrial disease.  The uterus was mobile.  2.  On laparoscopy, there was no evidence of  peritoneal disease.  No evidence of upper abdominal disease.  There was tumor growing through the uterus at the right fundus.  This measured approximately 6 cm in total.  There was also tumor present in the right adnexa, right parametria, cervix.  There were enlarged right pelvic lymph nodes.  There are also enlarged bilateral periaortic lymph nodes.  The largest lymph node was in the left periaortic region which measured approximately 4 x 3 cm.  3.  The bilateral periaortic lymph nodes were densely adherent to major vascular structures and therefore after removal of bilateral periaortic lymph nodes, the dissection was abandoned as a was not technically feasible to separate the lymph nodes from the inferior vena cava in the upper abdomen.  The right pelvic lymph nodes were also densely adherent to the right external iliac vein and therefore removal of these lymph nodes was not deemed technically feasible.  At the conclusion the procedure, she was left with residual disease in the bilateral high periotic lymph node chains, right pelvis.  The tumor debulking was suboptimal.      Complications:   None    Procedure and Technique:  After informed consent was obtained, epidural anesthesia was administered and the patient was taken the operating room where general endotracheal anesthesia was administered without incident.  She was then prepped and draped in normal sterile fashion in the low dorsolithotomy position.  Examination under anesthesia was then performed with findings noted as above.  A Acuna catheter was inserted.  An attempt was made to place a Jamia uterine manipulator and Swati cup, however, due to a narrow introitus, the cup could not be placed adequately.  Therefore, sick centimeter manipulator was then placed.  Attention was then turned to the abdomen.  0.25% Marcaine was used to anesthetize the skin prior to placement of any trocar.  The first insertion site was approximately 1 cm superior to the umbilicus in  the midline.  An 8 mm skin incision was made using an 11 blade scalpel.  Through this was passed an 8 mm robotic trocar under direct visualization into the abdominal cavity.  The abdomen was then insufflated to 15 mmHg using CO2 gas.  Findings on laparoscopy are noted as above.  Additional trocars then placed under direct visualization.  There were 2 robotic trocars placed in the left side of the abdomen and 1 on the right side of the abdomen.  A 12 mm assistant port was then placed in the right upper quadrant.  Findings on laparoscopy are noted as above.  Attention was first turned to the bilateral periaortic lymph node dissection.  An incision was made in the peritoneum to mobilize the duodenum from the upper periaortic lymph nodes.  The duodenum was then reflected superiorly.  Attention was first turned to the right periotic lymph nodes.  A large right paracolic node measuring approximately 3 cm in diameter was able to be  from the aorta immediately overlying the inferior vena cava and the psoas muscle laterally.  The ureter was noted to be lateral to this dissection.  Attention was then turned to the left periaortic lymph node dissection.  Lymph nodes were removed around the inferior mesenteric artery.  The dissection then proceeded superior to the inferior mesenteric artery.  There was a large lymph node with extranodal extension of tumor identified.  The superior aspect of this was immediately adjacent to the inferior mesenteric vein and the ureter with the gonadal vessels.  The ureter and gonadal vessels were then  from the lymph node packet.  The IMV was also .  Of consultation from urology was obtained to ensure that the superior border of the lymph node was not adjacent to the renal hilum.  This was confirmed.  The dissection then proceeded from medial to lateral.  During the dissection, the lymph node fractured given gross tumor involvement.  Portions of the lymph node were then  removed.  Lymph node was then able to be  from the aorta immediately.  A lumbar artery and vein were transected during the dissection and need to be clipped with robotic clip applier's.  Hemostasis was then found to be adequate.  Additional removal lymph node was then accomplished, however, the lymph node was not able to be  from the posterior compartment adjacent to the vertebral bodies without causing additional hemorrhage.  Therefore, the area was packed and decision made to return to the right paramedic lymph node dissection.  An additional 3 cm lymph node was identified superior to the initial lymph node that was removed.  The right gonadal artery was noted to be crossing the lymph node bundle.  During dissection of this lymph node bottle, the gonadal artery on the right side was transected immediately adjacent to the aorta.  This was then grasped and clipped with robotic clips.  The dissection then proceeded, however, the lymph node was noted to be densely attached to the inferior vena cava.  There was no identifiable plane under the lymph node.  Therefore, the right.  Lymph node dissection was stopped.  Attention was then turned to the pelvis.  The right gonadal vessel had been skeletonized extensively due to the right paraclinical dissection.  The right ureter was freed from the gonadal vessels.  The right gonadal vessels were then cauterized and transected approximately 5 cm from the pelvic brim.  The retroperitoneal space was then opened lateral to the infundibulopelvic ligament.  The ureter was identified crossing the common iliac vessel.  The peritoneum was then dissected above the ureter allowing the right ovary and tube which were dilated and involved with malignancy to be elevated from the right pelvic sidewall.  The right round ligament was cauterized and transected.  The vesicovaginal space was developed.  There is noted to be dense adhesions present in the vesicovaginal space  likely secondary to parametrial involvement of tumor.  The bladder was ultimately able to be taken down with sharp dissection and cautery.  The right uterine vessels were then cauterized.  Attention was then turned to the left side.  The retroperitoneal space was opened.  The pararectal space was developed.  The ureter was identified.  The infundibulopelvic ligament was then skeletonized, cauterized and transected.  The vesicovaginal space was then completed by cauterizing and transecting the round ligament.  The bladder was then freed on the left side again, adhesions were noted.  The left uterine vessels were then skeletonized, cauterized and transected.  The left cardinal ligaments were cauterized and transected.  Attention was then returned to the right side.  During the right cardinal ligament dissection, was noted that the cardinal ligaments were densely infiltrated with tumor.  Therefore, a posterior colpotomy was then made and this was then transformed into a circumferential colpotomy using monopolar and bipolar cautery.  The uterus, cervix, bilateral tubes and ovaries were then placed in a anchor bag that was advanced into the pelvis transvaginally.  The anchor bag was then removed with the specimen transvaginally.  The vagina was then closed using a running 2-0 STRATAFIX suture.  Hemostasis was found to be excellent.  The right pelvic lymph nodes were evaluated and found to be densely adherent to the right external iliac vein and therefore the dissection was not thought to be technically feasible.  The pelvis was irrigated.  The pressure in the pelvis was brought down to 5 mmHg.  There is a small amount of bleeding at the base of the bladder which was cauterized.  The pressure was then increased.  The bilateral periaortic lymph node spaces were then evaluated.  There was no evidence of bleeding under low pressure in the right periaortic space.  The left paracolic node dissection was then evaluated.  There  was a small amount of bleeding identified.  This was controlled using a Floseal application.  Floseal was also applied to the right pelvic sidewall.  Hemostasis was then found to be adequate.  The robot was undocked.  The gas was removed from abdominal cavity.  The ports were then removed under direct visualization.  The fascia at the 12 mm port site was closed using a single interrupted 0 Vicryl suture.  The remainder of the port sites were closed using 4-0 Monocryl followed by Exofin.  The vagina was inspected.  There is no evidence of active bleeding identified.  The patient was then awakened and transferred to the recovery room in stable condition.  All sponge counts were x 2 for procedure.  No complications.  Estimate blood loss is 100 mL.     I was present for the entire procedure. and A physician assistant was required during the procedure for retraction, tissue handling, dissection and suturing.     Patient Disposition:  PACU              SIGNATURE: Lavon Jacome MD  DATE: October 29, 2024  TIME: 4:20 PM

## 2024-10-29 NOTE — ANESTHESIA POSTPROCEDURE EVALUATION
Post-Op Assessment Note    CV Status:  Stable  Pain Score: 0    Pain management: adequate       Mental Status:  Awake and sleepy   Hydration Status:  Euvolemic   PONV Controlled:  Controlled   Airway Patency:  Patent     Post Op Vitals Reviewed: Yes    No anethesia notable event occurred.    Staff: CRNA           Last Filed PACU Vitals:  Vitals Value Taken Time   Temp 98.1    Pulse 77 10/29/24 1557   /60 10/29/24 1557   Resp 18 10/29/24 1557   SpO2 100 % 10/29/24 1557   Vitals shown include unfiled device data.    Modified [place]:  No data recorded

## 2024-10-30 PROBLEM — D62 ACUTE BLOOD LOSS ANEMIA: Status: ACTIVE | Noted: 2024-10-30

## 2024-10-30 LAB
ABO GROUP BLD BPU: NORMAL
ABO GROUP BLD BPU: NORMAL
ANION GAP SERPL CALCULATED.3IONS-SCNC: 5 MMOL/L (ref 4–13)
BPU ID: NORMAL
BPU ID: NORMAL
BUN SERPL-MCNC: 13 MG/DL (ref 5–25)
CALCIUM SERPL-MCNC: 7.9 MG/DL (ref 8.4–10.2)
CHLORIDE SERPL-SCNC: 110 MMOL/L (ref 96–108)
CO2 SERPL-SCNC: 27 MMOL/L (ref 21–32)
CREAT SERPL-MCNC: 0.8 MG/DL (ref 0.6–1.3)
CROSSMATCH: NORMAL
CROSSMATCH: NORMAL
ERYTHROCYTE [DISTWIDTH] IN BLOOD BY AUTOMATED COUNT: 16.8 % (ref 11.6–15.1)
ERYTHROCYTE [DISTWIDTH] IN BLOOD BY AUTOMATED COUNT: 16.9 % (ref 11.6–15.1)
GFR SERPL CREATININE-BSD FRML MDRD: 71 ML/MIN/1.73SQ M
GLUCOSE SERPL-MCNC: 88 MG/DL (ref 65–140)
HCT VFR BLD AUTO: 28 % (ref 34.8–46.1)
HCT VFR BLD AUTO: 29 % (ref 34.8–46.1)
HGB BLD-MCNC: 8.8 G/DL (ref 11.5–15.4)
HGB BLD-MCNC: 8.9 G/DL (ref 11.5–15.4)
MAGNESIUM SERPL-MCNC: 2 MG/DL (ref 1.9–2.7)
MCH RBC QN AUTO: 30.8 PG (ref 26.8–34.3)
MCH RBC QN AUTO: 30.9 PG (ref 26.8–34.3)
MCHC RBC AUTO-ENTMCNC: 30.7 G/DL (ref 31.4–37.4)
MCHC RBC AUTO-ENTMCNC: 31.4 G/DL (ref 31.4–37.4)
MCV RBC AUTO: 100 FL (ref 82–98)
MCV RBC AUTO: 98 FL (ref 82–98)
PLATELET # BLD AUTO: 105 THOUSANDS/UL (ref 149–390)
PLATELET # BLD AUTO: 107 THOUSANDS/UL (ref 149–390)
PMV BLD AUTO: 8.9 FL (ref 8.9–12.7)
PMV BLD AUTO: 9.5 FL (ref 8.9–12.7)
POTASSIUM SERPL-SCNC: 3.9 MMOL/L (ref 3.5–5.3)
RBC # BLD AUTO: 2.85 MILLION/UL (ref 3.81–5.12)
RBC # BLD AUTO: 2.89 MILLION/UL (ref 3.81–5.12)
SODIUM SERPL-SCNC: 142 MMOL/L (ref 135–147)
UNIT DISPENSE STATUS: NORMAL
UNIT DISPENSE STATUS: NORMAL
UNIT PRODUCT CODE: NORMAL
UNIT PRODUCT CODE: NORMAL
UNIT PRODUCT VOLUME: 350 ML
UNIT PRODUCT VOLUME: 350 ML
UNIT RH: NORMAL
UNIT RH: NORMAL
WBC # BLD AUTO: 5.48 THOUSAND/UL (ref 4.31–10.16)
WBC # BLD AUTO: 6.45 THOUSAND/UL (ref 4.31–10.16)

## 2024-10-30 PROCEDURE — 83735 ASSAY OF MAGNESIUM: CPT | Performed by: STUDENT IN AN ORGANIZED HEALTH CARE EDUCATION/TRAINING PROGRAM

## 2024-10-30 PROCEDURE — 99024 POSTOP FOLLOW-UP VISIT: CPT | Performed by: OBSTETRICS & GYNECOLOGY

## 2024-10-30 PROCEDURE — 99223 1ST HOSP IP/OBS HIGH 75: CPT | Performed by: ANESTHESIOLOGY

## 2024-10-30 PROCEDURE — 85027 COMPLETE CBC AUTOMATED: CPT | Performed by: OBSTETRICS & GYNECOLOGY

## 2024-10-30 PROCEDURE — 97163 PT EVAL HIGH COMPLEX 45 MIN: CPT

## 2024-10-30 PROCEDURE — 97166 OT EVAL MOD COMPLEX 45 MIN: CPT

## 2024-10-30 PROCEDURE — 85027 COMPLETE CBC AUTOMATED: CPT | Performed by: STUDENT IN AN ORGANIZED HEALTH CARE EDUCATION/TRAINING PROGRAM

## 2024-10-30 PROCEDURE — 80048 BASIC METABOLIC PNL TOTAL CA: CPT | Performed by: STUDENT IN AN ORGANIZED HEALTH CARE EDUCATION/TRAINING PROGRAM

## 2024-10-30 RX ORDER — METHIMAZOLE 5 MG/1
5 TABLET ORAL DAILY
Qty: 90 TABLET | Refills: 1 | Status: SHIPPED | OUTPATIENT
Start: 2024-10-30

## 2024-10-30 RX ORDER — OXYCODONE HYDROCHLORIDE 5 MG/1
5 TABLET ORAL EVERY 4 HOURS PRN
Status: DISCONTINUED | OUTPATIENT
Start: 2024-10-30 | End: 2024-10-30

## 2024-10-30 RX ORDER — OXYCODONE HYDROCHLORIDE 5 MG/1
5 TABLET ORAL EVERY 4 HOURS PRN
Status: DISCONTINUED | OUTPATIENT
Start: 2024-10-30 | End: 2024-10-31 | Stop reason: HOSPADM

## 2024-10-30 RX ORDER — ACETAMINOPHEN 325 MG/1
975 TABLET ORAL EVERY 6 HOURS SCHEDULED
Status: DISCONTINUED | OUTPATIENT
Start: 2024-10-30 | End: 2024-10-31 | Stop reason: HOSPADM

## 2024-10-30 RX ORDER — HYDROMORPHONE HCL IN WATER/PF 6 MG/30 ML
0.2 PATIENT CONTROLLED ANALGESIA SYRINGE INTRAVENOUS
Status: DISCONTINUED | OUTPATIENT
Start: 2024-10-30 | End: 2024-10-31 | Stop reason: HOSPADM

## 2024-10-30 RX ORDER — HEPARIN SODIUM 5000 [USP'U]/ML
5000 INJECTION, SOLUTION INTRAVENOUS; SUBCUTANEOUS EVERY 8 HOURS SCHEDULED
Status: DISCONTINUED | OUTPATIENT
Start: 2024-10-30 | End: 2024-10-31 | Stop reason: HOSPADM

## 2024-10-30 RX ORDER — LIDOCAINE 40 MG/G
CREAM TOPICAL 4 TIMES DAILY PRN
Status: DISCONTINUED | OUTPATIENT
Start: 2024-10-30 | End: 2024-10-31 | Stop reason: HOSPADM

## 2024-10-30 RX ORDER — LIDOCAINE 50 MG/G
1 PATCH TOPICAL DAILY
Status: DISCONTINUED | OUTPATIENT
Start: 2024-10-30 | End: 2024-10-31 | Stop reason: HOSPADM

## 2024-10-30 RX ORDER — IBUPROFEN 600 MG/1
600 TABLET, FILM COATED ORAL EVERY 6 HOURS PRN
Status: DISCONTINUED | OUTPATIENT
Start: 2024-10-30 | End: 2024-10-31 | Stop reason: HOSPADM

## 2024-10-30 RX ORDER — ACETAMINOPHEN 325 MG/1
650 TABLET ORAL EVERY 6 HOURS SCHEDULED
Status: DISCONTINUED | OUTPATIENT
Start: 2024-10-30 | End: 2024-10-30

## 2024-10-30 RX ADMIN — ACETAMINOPHEN 650 MG: 325 TABLET, FILM COATED ORAL at 08:26

## 2024-10-30 RX ADMIN — ROPIVACAINE HYDROCHLORIDE 10 ML/HR: 2 INJECTION, SOLUTION EPIDURAL; INFILTRATION at 09:54

## 2024-10-30 RX ADMIN — METHIMAZOLE 5 MG: 5 TABLET ORAL at 16:42

## 2024-10-30 RX ADMIN — METHIMAZOLE 5 MG: 5 TABLET ORAL at 20:18

## 2024-10-30 RX ADMIN — ACETAMINOPHEN 975 MG: 325 TABLET, FILM COATED ORAL at 20:17

## 2024-10-30 RX ADMIN — HEPARIN SODIUM 5000 UNITS: 5000 INJECTION INTRAVENOUS; SUBCUTANEOUS at 23:08

## 2024-10-30 RX ADMIN — LIDOCAINE 4%: 4 CREAM TOPICAL at 08:26

## 2024-10-30 RX ADMIN — SODIUM CHLORIDE, SODIUM LACTATE, POTASSIUM CHLORIDE, AND CALCIUM CHLORIDE 75 ML/HR: .6; .31; .03; .02 INJECTION, SOLUTION INTRAVENOUS at 06:57

## 2024-10-30 RX ADMIN — HEPARIN SODIUM 5000 UNITS: 5000 INJECTION INTRAVENOUS; SUBCUTANEOUS at 16:42

## 2024-10-30 RX ADMIN — ROPIVACAINE HYDROCHLORIDE: 2 INJECTION, SOLUTION EPIDURAL; INFILTRATION at 01:38

## 2024-10-30 RX ADMIN — DOCUSATE SODIUM 100 MG: 100 CAPSULE, LIQUID FILLED ORAL at 16:42

## 2024-10-30 RX ADMIN — HYDROMORPHONE HYDROCHLORIDE 0.2 MG: 0.2 INJECTION, SOLUTION INTRAMUSCULAR; INTRAVENOUS; SUBCUTANEOUS at 06:56

## 2024-10-30 RX ADMIN — DOCUSATE SODIUM 100 MG: 100 CAPSULE, LIQUID FILLED ORAL at 08:26

## 2024-10-30 RX ADMIN — METHIMAZOLE 5 MG: 5 TABLET ORAL at 08:26

## 2024-10-30 RX ADMIN — LIDOCAINE 1 PATCH: 50 PATCH CUTANEOUS at 20:24

## 2024-10-30 NOTE — PHYSICAL THERAPY NOTE
PHYSICAL THERAPY EVALUATION  NAME:  Nallely Peacock  DATE: 10/30/24    AGE:   74 y.o.  Mrn:   [id number]  ADMIT DX:  Endometrial cancer (HCC) [C54.1]    Past Medical History:   Diagnosis Date    High blood pressure     Hyperlipidemia     Hypertension        Past Surgical History:   Procedure Laterality Date    COLONOSCOPY      IR PORT PLACEMENT  8/26/2024    LEG SURGERY Right     inserted ebenezer 12/20/2021       Length Of Stay: 0    PHYSICAL THERAPY EVALUATION:        10/30/24 1034   Note Type   Note type Evaluation   Pain Assessment   Pain Assessment Tool 0-10   Pain Score 3   Pain Location/Orientation Location: Abdomen;Other (Comment)  (L knee)   Pain Onset/Description Onset: Ongoing;Frequency: Constant/Continuous;Descriptor: Aching   Effect of Pain on Daily Activities increased pain with activity   Patient's Stated Pain Goal No pain   Hospital Pain Intervention(s) Ambulation/increased activity;Repositioned   Restrictions/Precautions   Weight Bearing Precautions Per Order No   Other Precautions Multiple lines;Pain   Home Living   Type of Home House   Home Layout One level;Stairs to enter with rails  (3 [address]RE[address])   Home Equipment Walker   Additional Comments Pt reports living alone, but states she has supportive local family who are able to assist her as needed   Prior Function   Level of [place] Independent with functional mobility   Lives With Alone   Receives Help From Family   Falls in the last 6 months 0   Comments Pt denies the use of an AD for ambulation PTA   General   Family/Caregiver Present No   Cognition   Overall Cognitive Status WFL   Arousal/Participation Alert   Attention Within functional limits   Orientation Level Oriented X4   Memory Within functional limits   Following Commands Follows all commands and directions without difficulty   RUE Assessment   RUE Assessment WFL   LUE Assessment   LUE Assessment WFL   RLE Assessment   RLE Assessment WFL   LLE Assessment   LLE Assessment WFL   Bed Mobility    Supine to Sit 5  Supervision   Additional items Increased time required   Transfers   Sit to Stand 5  Supervision   Additional items Increased time required   Stand to Sit 5  Supervision   Additional items Increased time required   Ambulation/Elevation   Gait pattern Short stride;Foward flexed   Gait Assistance 5  Supervision   Assistive Device Rolling walker   Distance 65ft x 2   Balance   Static Sitting Good   Static Standing Fair +   Ambulatory Fair   Activity Tolerance   Activity Tolerance Patient tolerated treatment well   Medical Staff Made Aware Albino, OT; Gael, SPT; OT present for co evaluation due to pts current medical presentation   Nurse Made Aware Pt appropriate to be seen and mobilize per nsg   Assessment   Prognosis Good   Problem List Decreased mobility;Pain;Decreased endurance   Assessment Pt is 74 y.o. female seen for PT evaluation s/p admit to [facility] on 10/29/2024. Two pt identifiers were used to confirm. Pt presented for scheduled ROBOTIC TOTAL LAPAROSCOPIC HYSTERECTOMY. BILATERAL SALPINGO-OOPHERECTOMY; Bilateral - BILATERAL PARAAORTIC LYMPH NODE DISSECTION. DEBULKING.  Pt was admitted with a primary dx of: endometrial cancer s/p ROBOTIC TOTAL LAPAROSCOPIC HYSTERECTOMY.;BILATERAL SALPINGO-OOPHERECTOMY  Bilateral - BILATERAL PARAAORTIC LYMPH NODE DISSECTION. DEBULKING.  PT now consulted for assessment of mobility and d/c needs. Pt with Up as tolerated orders.  Pts current co morbidities affecting treatment include:  has a past medical history of High blood pressure, Hyperlipidemia, and Hypertension. . Pts current clinical presentation is Unstable/ Unpredictable (high complexity) due to Ongoing medical management for primary dx, Increased reliance on more restrictive AD compared to baseline, Decreased activity tolerance compared to baseline, Continuous pulse oximetry monitoring , s/p surgical intervention  . Upon evaluation, pt currently is requiring Supervision for bed mobility;  "Supervision for transfers and Supervision for ambulation w/ RW. Pt presents at PT eval functioning below baseline and currently w/ overall mobility deficits 2* to: decreased endurance, pain, decreased activity tolerance compared to baseline. At conclusion of PT session pt returned back in chair with phone and call bell within reach. Pt denies any further questions at this time. PT is currently recommending  no post acute rehab needs .  Pt/ family agreeable to plan and goals as stated on evaluation. PT will continue to follow during hospital stay.   Barriers to Discharge None   Barriers to Discharge Comments Pt denies any mobility or safety concerns about returning home at time of d/c   Goals   Patient Goals \" to go home\"   Plan   PT Frequency   (DC IPPT)   Discharge Recommendation   Rehab Resource Intensity Level, PT No post-acute rehabilitation needs   Equipment Recommended Walker   Walker Package Recommended Wheeled walker   AM-PAC Basic Mobility Inpatient   Turning in Flat Bed Without Bedrails 4   Lying on Back to Sitting on Edge of Flat Bed Without Bedrails 4   Moving Bed to Chair 4   Standing Up From Chair Using Arms 4   Walk in Room 3   Climb 3-5 Stairs With Railing 3   Basic Mobility Inpatient Raw Score 22   Basic Mobility Standardized Score 47.4   [facility] Highest Level Of Mobility   -HLM Goal 7: Walk 25 feet or more   JH-HLM Achieved 7: Walk 25 feet or more   Modified [place] Scale   Modified [place] Scale 2   Portions of the documentation may have been created using voice recognition software.Occasional wrong word or sound alike substitutions may have occurred due to the inherent limitations of the voice recognition software. Read the chart carefully and recognize, using context, where substitutions have occurred.    Fish Mora, PT, DPT      "

## 2024-10-30 NOTE — QUICK NOTE
Returned call from patient's daughter Marie and provided updates from today with anticipated discharge tomorrow. Marie states she will be available to  the patient after 5PM.     All questions answered.

## 2024-10-30 NOTE — ASSESSMENT & PLAN NOTE
Immediate postoperative hemoglobin 9.3 g/dL from a preoperative value of 10.5 g/dL.  Current hemoglobin is 8.8 g/dL.  Heparin has been held for epidural removal.  Will continue to trend.  She is asymptomatic.  Plan for repeat hemoglobin at noon.

## 2024-10-30 NOTE — PROGRESS NOTES
Progress Note - GYN Oncology   Name: Nallely Peacock 74 y.o. female I MRN: [id number]  Unit/Bed#: [facility] 611-01 I Date of Admission: 10/29/2024   Date of Service: 10/30/2024 I Hospital Day: 0    Assessment & Plan  Endometrial cancer (HCC)  74-year-old postoperative day 1 status post robotic assisted total laparoscopic hysterectomy, bilateral salpingo-oophorectomy, bilateral suboptimal periaortic lymph node dissection/debulking.  1.  Plan for epidural removal and Acuna catheter removal this morning.  Out of bed as tolerated.  2.  Likely plan for discharge tomorrow pending follow-up of acute blood loss anemia, clinical status.  Hyperthyroidism  Continue Tapazole  Acute blood loss anemia  Immediate postoperative hemoglobin 9.3 g/dL from a preoperative value of 10.5 g/dL.  Current hemoglobin is 8.8 g/dL.  Heparin has been held for epidural removal.  Will continue to trend.  She is asymptomatic.  Plan for repeat hemoglobin at noon.    24 Hour Events : Left knee pain  Subjective : No nausea, vomiting, chest pain, shortness of breath.  She has left knee pain and has been using ice.  No traumatic injury.    Objective :  Temp:  [97.1 °F (36.2 °C)-98.2 °F (36.8 °C)] 98.2 °F (36.8 °C)  HR:  [60-94] 88  BP: (104-134)/(58-96) 116/59  Resp:  [14-22] 18  SpO2:  [95 %-100 %] 99 %  O2 Device: Nasal cannula  Urine output 1 L overnight, 1.5 mL/kg/h.  Urine is clear.    Physical Exam  Constitutional:       General: She is not in acute distress.     Appearance: She is not ill-appearing, toxic-appearing or diaphoretic.   Pulmonary:      Effort: Pulmonary effort is normal.   Abdominal:      General: There is no distension.      Palpations: Abdomen is soft.      Tenderness: There is abdominal tenderness. There is no guarding or rebound.   Skin:     General: Skin is warm and dry.      Comments: Incisions are clean dry and intact   Neurological:      General: No focal deficit present.      Mental Status: She is alert and oriented to person,  place, and time.   Psychiatric:         Mood and Affect: Mood normal.         Behavior: Behavior normal.         Lab Results: I have reviewed the following results:CBC/BMP:   .     10/30/24  0642   WBC 5.48   HGB 8.8*   HCT 28.0*   *   SODIUM 142   K 3.9   *   CO2 27   BUN 13   CREATININE 0.80   GLUC 88   MG 2.0        Imaging Results Review: No pertinent imaging studies reviewed.  Other Study Results Review: Other studies reviewed include: BMP    VTE Pharmacologic Prophylaxis: Reason for no pharmacologic prophylaxis ABLA  VTE Mechanical Prophylaxis: sequential compression device

## 2024-10-30 NOTE — OCCUPATIONAL THERAPY NOTE
Occupational Therapy Evaluation     Patient Name: Nallely Peacock  Today's Date: 10/30/2024  Problem List  Principal Problem:    Endometrial cancer (HCC)  Active Problems:    Hyperthyroidism    Acute blood loss anemia    Past Medical History  Past Medical History:   Diagnosis Date    High blood pressure     Hyperlipidemia     Hypertension      Past Surgical History  Past Surgical History:   Procedure Laterality Date    COLONOSCOPY      IR PORT PLACEMENT  8/26/2024    LEG SURGERY Right     inserted ebenezer 12/20/2021         10/30/24 1033   OT Last Visit   OT Visit Date 10/30/24   Note Type   Note type Evaluation   Pain Assessment   Pain Assessment Tool 0-10   Pain Score 3   Pain Location/Orientation Location: Abdomen  (+ L KNEE)   Patient's Stated Pain Goal No pain   Hospital Pain Intervention(s) Repositioned;Ambulation/increased activity;Emotional support   Restrictions/Precautions   Weight Bearing Precautions Per Order No   Other Precautions Multiple lines;Pain   Home Living   Type of Home House   Home Layout One level;Stairs to enter with rails  (3 MONTRELL)   Bathroom Shower/Tub Tub/shower unit   Bathroom Toilet Standard   Bathroom Equipment Shower chair   Bathroom Accessibility Accessible   Home Equipment Walker   Additional Comments NO USE OF DME AT BASELINE   Prior Function   Level of [place] Independent with ADLs;Independent with functional mobility;Independent with IADLS   Lives With Alone   Receives Help From Family   IADLs Independent with driving;Independent with meal prep;Independent with medication management   Falls in the last 6 months 0   Vocational Retired   Lifestyle   [employer] PT REPORTS BEING I WITH ADLS/IADLS/DRIVING PTA.   Reciprocal Relationships LIVES ALONE FOLLOWING RECENT PASSSING OF HER SPOUSE. PT REPORTS LOCAL SUPPORTIVE CHILDREN WHO PLAN TO ASSIST AS NEEDED   Service to Others RETIRED; SOLD OFFICE SUPPLIES. PT REPORTS SHE REALLY ENJOYED HER CAREER   Intrinsic Gratification ENJOYS  SPENDING TIME WITH FAMILY + TAKING CARE OF HER HOME   ADL   Eating Assistance 7  Independent   Grooming Assistance 7  Independent   UB Bathing Assistance 5  Supervision/Setup   LB Bathing Assistance 4  Minimal Assistance   UB Dressing Assistance 5  Supervision/Setup   LB Dressing Assistance 4  Minimal Assistance   Toileting Assistance  5  Supervision/Setup   Functional Assistance 5  Supervision/Setup   Bed Mobility   Supine to Sit 5  Supervision   Additional items Increased time required   Sit to Supine Unable to assess   Additional Comments PT LEFT OOB WITH ALL NEEDS IN REACH.   Transfers   Sit to Stand 5  Supervision   Additional items Increased time required   Stand to Sit 5  Supervision   Additional items Increased time required   Functional Mobility   Functional Mobility 5  Supervision   Additional items Rolling walker   Balance   Static Sitting Good   Static Standing Fair +   Ambulatory Fair   Activity Tolerance   Activity Tolerance Patient tolerated treatment well   Medical Staff Made Aware PT SEEN FOR CO-EVAL WITH SKILLED PHYSICAL THERAPIST 2' NEW PRECAUTIONS/LIMITATIONS, AND LIMITED ACTIVITY TOLERANCE WHICH IMPACT PERFORMANCE AND ARE A REGRESSION FROM PT'S BASELINE.   Nurse Made Aware APPROPRIATE TO SEE PER RN.   RUE Assessment   RUE Assessment WFL   LUE Assessment   LUE Assessment WFL   Hand Function   Gross Motor Coordination Functional   Fine Motor Coordination Functional   Psychosocial   Psychosocial (WDL) WDL   Cognition   Overall Cognitive Status WFL   Arousal/Participation Alert;Cooperative   Attention Within functional limits   Orientation Level Oriented X4   Memory Within functional limits   Following Commands Follows all commands and directions without difficulty   Comments PT IS PLEASANT AND COOPERATIVE.   Assessment   Assessment 73 YO Female SEEN FOR INITIAL OCCUPATIONAL THERAPY EVALUATION FOLLOWING ADMISSION TO [facility] WITH ENDOMETRIAL CA S/P ROBOTIC ASSISTED CHRISTIAN EGAN, B/L  PERIAORTIC LYMPH NODE DISSECTION/DEBULKING ON 10/29/24. PROBLEMS LIST/PMH INCLUDES High blood pressure, Hyperlipidemia, and Hypertension. PT IS FROM HOME ALONE WHERE SHE REPORTS BEING INDEPENDENT WITH ADLS/IADLS/DRIVING PTA. PT CURRENTLY REQUIRES OVERALL S-MIN A WITH ADLS AND S WITH TRANSFERS / FUNCTIONAL MOBILITY WITH USE OF RW. PT IS EXPERIENCING EXPECTED LIMITATIONS 2' PAIN, FATIGUE, IMPAIRED BALANCE, and OVERALL LIMITED ACTIVITY TOLERANCE. PT EDUCATED ON DEEP BREATHING TECHNIQUES T/O ACTIVITY, SLOWING OF PACE, ENERGY CONSERVATION TECHNIQUES FOR CARRY OVER UPON D/C, INCREASED FAMILY SUPPORT, and CONTINUE PARTICIPATION IN SELF-CARE/MOBILITY WITH STAFF WHILE IN THE HOSPITAL . The patient's raw score on the -PAC Daily Activity Inpatient Short Form is 20. A raw score of greater than or equal to 19 suggests the patient may benefit from discharge to home. Please refer to the recommendation of the Occupational Therapist for safe discharge planning.  FROM AN OCCUPATIONAL THERAPY PERSPECTIVE, PT CAN RETURN HOME WITH INCREASED FAMILY SUPPORT WHEN MEDICALLY CLEARED. ALL QUESTIONS/CONCERNS ADDRESSED. NO ADDITIONAL ACUTE CARE OT NEEDS. D/C OT.   Goals   Patient Goals TO RETURN HOME   Discharge Recommendation   Rehab Resource Intensity Level, OT No post-acute rehabilitation needs   Equipment Recommended   (USE OF SC AND RW- PT AGREEABLE)   AM-PAC Daily Activity Inpatient   Lower Body Dressing 3   Bathing 3   Toileting 3   Upper Body Dressing 3   Grooming 4   Eating 4   Daily Activity Raw Score 20   Daily Activity Standardized Score (Calc for Raw Score >=11) 42.03   AM-PAC Applied Cognition Inpatient   Following a Speech/Presentation 4   Understanding Ordinary Conversation 4   Taking Medications 4   Remembering Where Things Are Placed or Put Away 4   Remembering List of 4-5 Errands 4   Taking Care of Complicated Tasks 4   Applied Cognition Raw Score 24   Applied Cognition Standardized Score 62.21       Documentation completed  by JEZ Bowen, OTR/L  MOCA Certified ID# [id number]

## 2024-10-30 NOTE — ANESTHESIA PROCEDURE NOTES
Epidural Block    Patient location during procedure: holding area  Start time: 10/29/2024 11:15 AM  Reason for block: procedure for pain, at surgeon's request and post-op pain management  Staffing  Performed by: Todd Hull MD  Authorized by: Todd Hull MD    Preanesthetic Checklist  Completed: patient identified, IV checked, site marked, risks and benefits discussed, surgical consent, monitors and equipment checked, pre-op evaluation and timeout performed  Epidural  Patient position: sitting  Prep: ChloraPrep  Sedation Level: no sedation  Patient monitoring: frequent blood pressure checks, continuous pulse oximetry and heart rate  Approach: midline  Location: thoracic, T7-8  Injection technique: MELBA saline and MELBA air  Needle  Needle type: Tuohy   Needle gauge: 17 G  Needle insertion depth: 6 cm  Catheter type: multi-orifice  Catheter size: 20 G  Catheter at skin depth: 12 cm  Catheter securement method: stabilization device and clear occlusive dressing  Test dose: negative  Assessment  Number of attempts: 3 or morenegative aspiration for CSF, negative aspiration for heme and no paresthesia on injection  patient tolerated the procedure well with no immediate complications

## 2024-10-30 NOTE — ASSESSMENT & PLAN NOTE
Stage III C2 high-grade endometrial cancer s/p robotic assisted JULISA, BSO, bilateral periaortic lymph node dissection/debulking on 10/29/2024.    Epidural 0.2% ropivacaine placed 10/29/2024    Plan  Plan for epidural removal today.  Tylenol 975 mg q6 scheduled  Dilaudid 0.2mg q3 for breakthrough pain  Oxycodone 2.5/5 mg q4 for moderate/severe pain  Lidocaine patch for left knee pain

## 2024-10-30 NOTE — CASE MANAGEMENT
Case Management Assessment    Patient name Nallely Peacock  Location [facility]-01 MRN [id number]  : 1950 Date 10/30/2024       Current Admission Date: 10/29/2024  Current Admission Diagnosis:Endometrial cancer (HCC)   Patient Active Problem List    Diagnosis Date Noted Date Diagnosed    Acute blood loss anemia 10/30/2024     Mild protein-calorie malnutrition (HCC) 09/10/2024     Endometrial cancer (HCC) 2024     Weight loss 2024     Tachycardia 2024     Hyperthyroidism 2023     Heart murmur 2022     Moderate aortic stenosis 2022     Lower gastrointestinal hemorrhage 2022     Mixed hyperlipidemia 2022     Essential (primary) hypertension 2008       LOS (days): 0  Geometric Mean LOS (GMLOS) (days): 2.7  Days to GMLOS:2.7     OBJECTIVE:     Current admission status: Inpatient    Preferred Pharmacy:   [id number] - [place] - [address]  [address] [place] [place] [address]  Phone: [phone] Fax: [phone]    Primary Care Provider: Swapna Colmenares DO    Primary Insurance: MEDICARE  Secondary Insurance: AARP    ASSESSMENT:  Active Health Care Proxies    There are no active Health Care Proxies on file.     Advance Directives  Does patient have a Health Care POA?: No  Does patient have Advance Directives?: No  Primary Contact: pt's daughter Marie Schmitt / phone# [phone]    Patient Information  Admitted from:: Home  Mental Status: Alert  Assessment information provided by:: Patient  Primary Caregiver: Self  Support Systems: Daughter  County of Residence: [place]  What city do you live in?: [place]  Home entry access options. Select all that apply.: Stairs  Number of steps to enter home.: 3  Do the steps have railings?: Yes  Type of Current Residence: Trailer Home  Upon entering residence, is there a bedroom on the main floor (no further steps)?: Yes  Upon entering residence, is there a  bathroom on the main floor (no further steps)?: Yes  Living Arrangements: Lives Alone  Is patient a ?: No    Activities of Daily Living Prior to Admission  Functional Status: Independent  Completes ADLs independently?: Yes  Ambulates independently?: Yes  Does patient use assisted devices?: No  Does patient currently own DME?: No  Does the patient have a history of Short-Term Rehab?: No  Does patient have a history of HHC?: No    Patient Information Continued  Income Source: Pension/long term  Does patient have prescription coverage?: Yes  Does patient receive dialysis treatments?: No  Does patient have a history of substance abuse?: No  Does patient have a history of Mental Health Diagnosis?: No    Means of Transportation  Means of Transport to Appts:: Drives Self    Social Determinants of Health (SDOH)      Flowsheet Row Most Recent Value   Housing Stability    In the last 12 months, was there a time when you were not able to pay the mortgage or rent on time? N   In the past 12 months, how many times have you moved where you were living? 0   At any time in the past 12 months, were you homeless or living in a shelter (including now)? N   Transportation Needs    In the past 12 months, has lack of transportation kept you from medical appointments or from getting medications? no   In the past 12 months, has lack of transportation kept you from meetings, work, or from getting things needed for daily living? No   Food Insecurity    Within the past 12 months, you worried that your food would run out before you got the money to buy more. Never true   Within the past 12 months, the food you bought just didn't last and you didn't have money to get more. Never true   Utilities    In the past 12 months has the electric, gas, oil, or water company threatened to shut off services in your home? No     Additional Comments: CM reviewed d/c planning process including the following: identifying help at home, patient  preference for d/c planning needs, availability of treatment team to discuss questions or concerns patient and/or family may have regarding understanding medications and recognizing signs and symptoms once discharged. CM also encouraged patient to follow up with all recommended appointments after discharge. Patient advised of importance for patient and family to participate in managing patient’s medical well being. Patient/caregiver received discharge checklist. Content reviewed. Patient/caregiver encouraged to participate in discharge plan of care prior to discharge home.

## 2024-10-30 NOTE — ANESTHESIA POSTPROCEDURE EVALUATION
Post-Op Assessment Note    CV Status:  Stable  Pain Score: 0    Pain management: adequate       Mental Status:  Awake and sleepy   Hydration Status:  Euvolemic   PONV Controlled:  Controlled   Airway Patency:  Patent     Post Op Vitals Reviewed: Yes    No anethesia notable event occurred.    Staff: CRNA           Last Filed PACU Vitals:  Vitals Value Taken Time   Temp 98.1    Pulse 77 10/29/24 1557   /60 10/29/24 1557   Resp 18 10/29/24 1557   SpO2 100 % 10/29/24 1557   Vitals shown include unfiled device data.    Modified Bentley:  Activity: 2 (10/29/2024  4:45 PM)  Respiration: 2 (10/29/2024  4:45 PM)  Circulation: 2 (10/29/2024  4:45 PM)  Consciousness: 1 (10/29/2024  4:45 PM)  Oxygen Saturation: 1 (10/29/2024  4:45 PM)  Modified Bentley Score: 8 (10/29/2024  4:45 PM)

## 2024-10-30 NOTE — PROGRESS NOTES
"GYN ONC Surgery Post-Op Note    Procedure:   Procedure(s):  ROBOTIC TOTAL LAPAROSCOPIC HYSTERECTOMY, BILATERAL SALPINGO-OOPHERECTOMY  BILATERAL PARAAORTIC LYMPH NODE DISSECTION, DEBULKING    Subjective:   Patient is doing well postop.  Pain is well controlled with epidural. Denies N/V. No complaints at this time. Very tired    Objective:   Vitals:  Vitals:    10/29/24 2207   BP: 105/62   Pulse: 85   Resp:    Temp: 98.1 °F (36.7 °C)   SpO2: 98%       No intake/output data recorded.    Physical Exam:  General: good  CV: RRR  Resp: CTAB  Abd: soft, incisions C/D/I, Normal BS  Ext: pulses normal    Vitals:  /62 (10/29/24 2207)    Temp 98.1 °F (36.7 °C) (10/29/24 2207)    Pulse 85 (10/29/24 2207)   Resp      SpO2 98 % (10/29/24 2207)      I/Os:  No intake/output data recorded.    Lab Results and Cultures:   CBC with diff:   Lab Results   Component Value Date    WBC 5.92 10/29/2024    HGB 9.3 (L) 10/29/2024    HCT 29.5 (L) 10/29/2024     (H) 10/29/2024    PLT 92 (L) 10/29/2024    RBC 2.96 (L) 10/29/2024    MCH 31.4 10/29/2024    MCHC 31.5 10/29/2024    RDW 16.5 (H) 10/29/2024    MPV 9.2 10/29/2024    NRBC 0 10/22/2024   ,   BMP/CMP:  Lab Results   Component Value Date    K 3.9 10/29/2024     10/29/2024    CO2 26 10/29/2024    BUN 12 10/29/2024    CREATININE 0.73 10/29/2024    CALCIUM 8.2 (L) 10/29/2024    AST 12 (L) 10/22/2024    ALT 9 10/22/2024    ALKPHOS 76 10/22/2024    EGFR 80 10/29/2024   ,   Lipid Panel: No results found for: \"CHOL\",   Coags:   Lab Results   Component Value Date    INR 1.06 08/08/2024   ,     Current Medications:    Current Facility-Administered Medications:     docusate sodium (COLACE) capsule 100 mg, BID    [START ON 10/30/2024] heparin (porcine) subcutaneous injection 5,000 Units, Q8H JIMMY    HYDROmorphone HCl (DILAUDID) injection 0.2 mg, Q1H PRN    lactated ringers infusion, Continuous, Last Rate: Stopped (10/29/24 1656)    magnesium sulfate 2 g/50 mL IVPB (premix) 2 g, Once   "  methimazole (TAPAZOLE) tablet 5 mg, TID    ondansetron (ZOFRAN) injection 4 mg, Q6H PRN    ROPivacaine (NAROPIN) 0.2 % epidural infusion, Continuous, Last Rate: 10 mL/hr at 10/29/24 1630    Assessment/Plan:   Nallely Peacock is a 74 y.o. female s/p ROBOTIC TOTAL LAPAROSCOPIC HYSTERECTOMY, BILATERAL SALPINGO-OOPHERECTOMY  BILATERAL PARAAORTIC LYMPH NODE DISSECTION, DEBULKING    Pain: Pain well controlled on epidural  Diet:regular house  Activity:up as tolerated      Wanda Romayne Stengel Hohenshilt, PA-C, MD  10/29/2024Progress Note - Surgery-General   Name: Nallely Peacock 74 y.o. female I MRN: [id number]  Unit/Bed#: [facility] 611-01 I Date of Admission: 10/29/2024   Date of Service: 10/29/2024 I Hospital Day: 0     Assessment & Plan  Endometrial cancer (HCC)    Hyperthyroidism

## 2024-10-30 NOTE — CONSULTS
Consultation - Acute Pain   Name: Nallely Peacock 74 y.o. female I MRN: [id number]  Unit/Bed#: [facility] 611-01 I Date of Admission: 10/29/2024   Date of Service: 10/30/2024 I Hospital Day: 0   Inpatient consult to Acute Pain Service  Consult performed by: Calixto Aldana MD  Consult ordered by: Todd Hull MD        Physician Requesting Evaluation: Lavon Jacome,*   Reason for Evaluation / Principal Problem: Postop pain management, epidural follow-up    Assessment & Plan  Endometrial cancer (HCC)  Stage III C2 high-grade endometrial cancer s/p robotic assisted JULISA, BSO, bilateral periaortic lymph node dissection/debulking on 10/29/2024.    Epidural 0.2% ropivacaine placed 10/29/2024    Plan  Plan for epidural removal today.  Tylenol 975 mg q6 scheduled  Dilaudid 0.2mg q3 for breakthrough pain  Oxycodone 2.5/5 mg q4 for moderate/severe pain  Lidocaine patch for left knee pain    APS will continue to follow. Please contact Acute Pain Service - via Xactiumt from [phone] with additional questions or concerns. See Mayvenn or ContentRealtime for additional contacts and after hours information.     History of Present Illness    HPI: Nallely Peacock is a 74 y.o. year old female with hx of stage III C2 high-grade endometrial cancer s/p robotic assisted JULISA, BSO, bilateral periaortic lymph node dissection/debulking on 10/29/2024.  Epidural placed perioperatively.  APS consulted for postop pain management and epidural follow-up.    Upon evaluation patient was resting comfortably in bed, no acute distress.  Reports no abdominal pain or tenderness. Endorses left knee pain, but states she has dealt with this pain intermittently for years.  Has not ambulated yet.  Tolerating p.o. intake.  Denies nausea, vomiting, chest pain, shortness of breath.    Current pain location(s): Pain Score: 8  Pain Location/Orientation: Orientation: Left, Location: Knee  Pain Scale: Pain Assessment Tool: 0-10      Review of Systems    Constitutional:  Negative for chills and fever.   HENT:  Negative for ear pain and sore throat.    Eyes:  Negative for pain and visual disturbance.   Respiratory:  Negative for cough and shortness of breath.    Cardiovascular:  Negative for chest pain and palpitations.   Gastrointestinal:  Negative for abdominal pain and vomiting.   Genitourinary:  Negative for dysuria and hematuria.   Musculoskeletal:  Negative for arthralgias and back pain.   Skin:  Negative for color change and rash.   Neurological:  Negative for seizures and syncope.   All other systems reviewed and are negative.    I have reviewed the patient's PMH, PSH, Social History, Family History, Meds, and Allergies    Objective :  Temp:  [97.1 °F (36.2 °C)-98.2 °F (36.8 °C)] 98.2 °F (36.8 °C)  HR:  [60-94] 88  BP: (104-134)/(58-96) 116/59  Resp:  [14-22] 18  SpO2:  [95 %-100 %] 99 %  O2 Device: Nasal cannula    Physical Exam  Vitals and nursing note reviewed.   Constitutional:       General: She is not in acute distress.     Appearance: She is well-developed.   HENT:      Head: Normocephalic and atraumatic.   Eyes:      Conjunctiva/sclera: Conjunctivae normal.   Cardiovascular:      Rate and Rhythm: Normal rate and regular rhythm.      Pulses: Normal pulses.   Pulmonary:      Effort: Pulmonary effort is normal. No respiratory distress.   Abdominal:      General: There is no distension.      Palpations: Abdomen is soft.      Tenderness: There is no abdominal tenderness.      Comments: Abdominal incisions C/D/I   Musculoskeletal:         General: No swelling.      Cervical back: Neck supple.      Comments: Epidural site C/D/I   Skin:     General: Skin is warm and dry.      Capillary Refill: Capillary refill takes less than 2 seconds.   Neurological:      Mental Status: She is alert.   Psychiatric:         Mood and Affect: Mood normal.         Lab Results: I have reviewed the following results:  Estimated Creatinine Clearance: 51.7 mL/min (by C-G formula  based on SCr of 0.8 mg/dL).  Lab Results   Component Value Date    WBC 5.48 10/30/2024    HGB 8.8 (L) 10/30/2024    HCT 28.0 (L) 10/30/2024     (L) 10/30/2024         Component Value Date/Time    K 3.9 10/30/2024 0642     (H) 10/30/2024 0642    CO2 27 10/30/2024 0642    BUN 13 10/30/2024 0642    CREATININE 0.80 10/30/2024 0642         Component Value Date/Time    CALCIUM 7.9 (L) 10/30/2024 0642    ALKPHOS 76 10/22/2024 1021    AST 12 (L) 10/22/2024 1021    ALT 9 10/22/2024 1021    TP 6.7 10/22/2024 1021    ALB 3.8 10/22/2024 1021

## 2024-10-30 NOTE — ASSESSMENT & PLAN NOTE
74-year-old postoperative day 1 status post robotic assisted total laparoscopic hysterectomy, bilateral salpingo-oophorectomy, bilateral suboptimal periaortic lymph node dissection/debulking.  1.  Plan for epidural removal and Acuna catheter removal this morning.  Out of bed as tolerated.  2.  Likely plan for discharge tomorrow pending follow-up of acute blood loss anemia, clinical status.

## 2024-10-31 VITALS
BODY MASS INDEX: 21.51 KG/M2 | RESPIRATION RATE: 18 BRPM | SYSTOLIC BLOOD PRESSURE: 116 MMHG | TEMPERATURE: 98.7 F | HEIGHT: 64 IN | OXYGEN SATURATION: 100 % | DIASTOLIC BLOOD PRESSURE: 69 MMHG | WEIGHT: 126 LBS | HEART RATE: 102 BPM

## 2024-10-31 PROBLEM — Z90.710 STATUS POST HYSTERECTOMY: Status: ACTIVE | Noted: 2024-10-31

## 2024-10-31 LAB
ANION GAP SERPL CALCULATED.3IONS-SCNC: 4 MMOL/L (ref 4–13)
BUN SERPL-MCNC: 13 MG/DL (ref 5–25)
CALCIUM SERPL-MCNC: 7.9 MG/DL (ref 8.4–10.2)
CHLORIDE SERPL-SCNC: 110 MMOL/L (ref 96–108)
CO2 SERPL-SCNC: 28 MMOL/L (ref 21–32)
CREAT SERPL-MCNC: 0.69 MG/DL (ref 0.6–1.3)
ERYTHROCYTE [DISTWIDTH] IN BLOOD BY AUTOMATED COUNT: 17.1 % (ref 11.6–15.1)
GFR SERPL CREATININE-BSD FRML MDRD: 84 ML/MIN/1.73SQ M
GLUCOSE SERPL-MCNC: 94 MG/DL (ref 65–140)
HCT VFR BLD AUTO: 26.6 % (ref 34.8–46.1)
HGB BLD-MCNC: 8.3 G/DL (ref 11.5–15.4)
MCH RBC QN AUTO: 31.2 PG (ref 26.8–34.3)
MCHC RBC AUTO-ENTMCNC: 31.2 G/DL (ref 31.4–37.4)
MCV RBC AUTO: 100 FL (ref 82–98)
PLATELET # BLD AUTO: 106 THOUSANDS/UL (ref 149–390)
PMV BLD AUTO: 9.8 FL (ref 8.9–12.7)
POTASSIUM SERPL-SCNC: 3.7 MMOL/L (ref 3.5–5.3)
RBC # BLD AUTO: 2.66 MILLION/UL (ref 3.81–5.12)
SODIUM SERPL-SCNC: 142 MMOL/L (ref 135–147)
WBC # BLD AUTO: 3.46 THOUSAND/UL (ref 4.31–10.16)

## 2024-10-31 PROCEDURE — NC001 PR NO CHARGE: Performed by: ANESTHESIOLOGY

## 2024-10-31 PROCEDURE — 99024 POSTOP FOLLOW-UP VISIT: CPT | Performed by: OBSTETRICS & GYNECOLOGY

## 2024-10-31 PROCEDURE — 85027 COMPLETE CBC AUTOMATED: CPT

## 2024-10-31 PROCEDURE — NC001 PR NO CHARGE: Performed by: OBSTETRICS & GYNECOLOGY

## 2024-10-31 PROCEDURE — 80048 BASIC METABOLIC PNL TOTAL CA: CPT

## 2024-10-31 RX ORDER — IBUPROFEN 600 MG/1
600 TABLET, FILM COATED ORAL EVERY 6 HOURS PRN
Start: 2024-10-31

## 2024-10-31 RX ORDER — OXYCODONE HYDROCHLORIDE 5 MG/1
2.5 TABLET ORAL EVERY 4 HOURS PRN
Refills: 0 | Status: CANCELLED | OUTPATIENT
Start: 2024-10-31 | End: 2024-11-10

## 2024-10-31 RX ORDER — LIDOCAINE 50 MG/G
1 PATCH TOPICAL DAILY
Qty: 5 PATCH | Refills: 0 | Status: SHIPPED | OUTPATIENT
Start: 2024-10-31

## 2024-10-31 RX ORDER — OXYCODONE HYDROCHLORIDE 5 MG/1
5 TABLET ORAL EVERY 4 HOURS PRN
Refills: 0 | Status: CANCELLED | OUTPATIENT
Start: 2024-10-31 | End: 2024-11-10

## 2024-10-31 RX ADMIN — METHIMAZOLE 5 MG: 5 TABLET ORAL at 17:12

## 2024-10-31 RX ADMIN — DOCUSATE SODIUM 100 MG: 100 CAPSULE, LIQUID FILLED ORAL at 08:44

## 2024-10-31 RX ADMIN — HEPARIN SODIUM 5000 UNITS: 5000 INJECTION INTRAVENOUS; SUBCUTANEOUS at 13:37

## 2024-10-31 RX ADMIN — ACETAMINOPHEN 975 MG: 325 TABLET, FILM COATED ORAL at 17:12

## 2024-10-31 RX ADMIN — ACETAMINOPHEN 975 MG: 325 TABLET, FILM COATED ORAL at 08:44

## 2024-10-31 RX ADMIN — METHIMAZOLE 5 MG: 5 TABLET ORAL at 08:48

## 2024-10-31 RX ADMIN — DOCUSATE SODIUM 100 MG: 100 CAPSULE, LIQUID FILLED ORAL at 17:12

## 2024-10-31 RX ADMIN — HEPARIN SODIUM 5000 UNITS: 5000 INJECTION INTRAVENOUS; SUBCUTANEOUS at 06:04

## 2024-10-31 NOTE — PLAN OF CARE
Problem: INFECTION - ADULT  Goal: Absence of fever/infection during neutropenic period  Description: INTERVENTIONS:  - Monitor WBC    Outcome: Progressing     Problem: PAIN - ADULT  Goal: Verbalizes/displays adequate comfort level or baseline comfort level  Description: Interventions:  - Encourage patient to monitor pain and request assistance  - Assess pain using appropriate pain scale  - Administer analgesics based on type and severity of pain and evaluate response  - Implement non-pharmacological measures as appropriate and evaluate response  - Consider cultural and social influences on pain and pain management  - Notify physician/advanced practitioner if interventions unsuccessful or patient reports new pain  Outcome: Progressing     Problem: SAFETY ADULT  Goal: Patient will remain free of falls  Description: INTERVENTIONS:  - Educate patient/family on patient safety including physical limitations  - Instruct patient to call for assistance with activity   - Consult OT/PT to assist with strengthening/mobility   - Keep Call bell within reach  - Keep bed low and locked with side rails adjusted as appropriate  - Keep care items and personal belongings within reach  - Initiate and maintain comfort rounds    - Apply yellow socks and bracelet for high fall risk patients  - Consider moving patient to room near nurses station  Outcome: Progressing     Problem: SAFETY ADULT  Goal: Maintains/Returns to pre admission functional level  Description: INTERVENTIONS:  - Perform AM-PAC 6 Click Basic Mobility/ Daily Activity assessment daily.  - Set and communicate daily mobility goal to care team and   - Out of bed for toileting  - Record patient progress and toleration of activity level   Outcome: Progressing

## 2024-10-31 NOTE — ASSESSMENT & PLAN NOTE
Stage III C2 high-grade endometrial cancer s/p robotic assisted JULISA, BSO, bilateral periaortic lymph node dissection/debulking on 10/29/2024.    Epidural placed 10/29/2024 and removed 10/30/2024.    Pain is well-controlled on oral regimen.  Patient has not required any PRN pain meds.    Plan  Tylenol 975 mg q6 hours scheduled  Dilaudid 0.2mg q3 hours PRN for breakthrough pain  Oxycodone 2.5/5 mg q4 hours PRN for moderate/severe pain  Lidocaine patch for left knee pain

## 2024-10-31 NOTE — ASSESSMENT & PLAN NOTE
74-year-old postoperative day 2 status post robotic assisted total laparoscopic hysterectomy, bilateral salpingo-oophorectomy, bilateral suboptimal periaortic lymph node dissection/debulking.  1.  Epidural and shore removed yesterday  2.  Plan for discharge today.

## 2024-10-31 NOTE — ASSESSMENT & PLAN NOTE
Immediate postoperative hemoglobin 9.3 g/dL from a preoperative value of 10.5 >8.8>8.3. S/p epidural removal.   Lab Results   Component Value Date    WBC 3.46 (L) 10/31/2024    HGB 8.3 (L) 10/31/2024    HCT 26.6 (L) 10/31/2024     (H) 10/31/2024     (L) 10/31/2024

## 2024-10-31 NOTE — PROGRESS NOTES
Progress Note - Acute Pain   Name: Nallely Peacock 74 y.o. female I MRN: [id number]  [facility] I Date of Admission: 10/29/2024   Date of Service: 10/31/2024 I Hospital Day: 1    Assessment & Plan  Endometrial cancer (HCC)  Stage III C2 high-grade endometrial cancer s/p robotic assisted JULISA, BSO, bilateral periaortic lymph node dissection/debulking on 10/29/2024.    Epidural placed 10/29/2024 and removed 10/30/2024.    Pain is well-controlled on oral regimen.  Patient has not required any PRN pain meds.    Plan  Tylenol 975 mg q6 hours scheduled  Dilaudid 0.2mg q3 hours PRN for breakthrough pain  Oxycodone 2.5/5 mg q4 hours PRN for moderate/severe pain  Lidocaine patch for left knee pain    APS will sign off at this time. Thank you for the consult. All opioids and other analgesics to be written at discretion of primary team. Please contact Acute Pain Service - via [employer] from [phone] with additional questions or concerns. See [employer] or [employer] for additional contacts and after hours information.    Subjective   Nallely Peacock is a 74 y.o. female with hx of stage III C2 high-grade endometrial cancer s/p robotic assisted JULISA, BSO, bilateral periaortic lymph node dissection/debulking on 10/29/2024.  Epidural placed 10/29 and removed 10/30.  APS consulted for postop pain management and epidural follow-up.     Upon evaluation patient was resting comfortably in bed, no acute distress.  Reports no abdominal pain or tenderness.  States left knee pain is improved.  Has ambulated without issue.  Tolerating p.o. intake.  Denies nausea, vomiting, chest pain, shortness of breath.    Pain History  Current pain location(s):  Pain Score: 0  Pain Location/Orientation: Location: Back  Pain Scale: Pain Assessment Tool: 0-10    Meds/Allergies   all current active meds have been reviewed and current meds:   Current Facility-Administered Medications:     acetaminophen (TYLENOL) tablet 975 mg, Q6H JIMMY    docusate  sodium (COLACE) capsule 100 mg, BID    heparin (porcine) subcutaneous injection 5,000 Units, Q8H JIMMY    HYDROmorphone HCl (DILAUDID) injection 0.2 mg, Q3H PRN    ibuprofen (MOTRIN) tablet 600 mg, Q6H PRN    lidocaine (LIDODERM) 5 % patch 1 patch, Daily    lidocaine (LMX) 4 % cream, 4x Daily PRN    methimazole (TAPAZOLE) tablet 5 mg, TID    naloxone (NARCAN) 0.04 mg/mL syringe 0.04 mg, Q1MIN PRN    ondansetron (ZOFRAN) injection 4 mg, Q6H PRN    oxyCODONE (ROXICODONE) IR tablet 5 mg, Q4H PRN    oxyCODONE (ROXICODONE) split tablet 2.5 mg, Q4H PRN  Allergies   Allergen Reactions    Citrus - Food Allergy Hives    Codeine Hives    Strawberry (Diagnostic) - Food Allergy Hives    Penicillin G Rash     Objective :  Temp:  [98.2 °F (36.8 °C)-99.2 °F (37.3 °C)] 98.8 °F (37.1 °C)  HR:  [] 61  BP: (114-125)/(59-72) 125/72  Resp:  [17] 17  SpO2:  [88 %-100 %] 88 %  O2 Device: None (Room air)    Physical Exam  Vitals and nursing note reviewed.   Constitutional:       General: She is not in acute distress.     Appearance: She is well-developed.   HENT:      Head: Normocephalic and atraumatic.   Eyes:      Conjunctiva/sclera: Conjunctivae normal.   Cardiovascular:      Rate and Rhythm: Normal rate and regular rhythm.      Pulses: Normal pulses.   Pulmonary:      Effort: Pulmonary effort is normal. No respiratory distress.   Abdominal:      General: There is no distension.      Palpations: Abdomen is soft.      Tenderness: There is no abdominal tenderness.      Comments: Abdominal incisions C/D/I   Musculoskeletal:         General: No swelling.      Cervical back: Neck supple.   Skin:     General: Skin is warm and dry.      Capillary Refill: Capillary refill takes less than 2 seconds.   Neurological:      Mental Status: She is alert.   Psychiatric:         Mood and Affect: Mood normal.            Lab Results: I have reviewed the following results:  Estimated Creatinine Clearance: 59.9 mL/min (by C-G formula based on SCr of  0.69 mg/dL).  Lab Results   Component Value Date    WBC 3.46 (L) 10/31/2024    HGB 8.3 (L) 10/31/2024    HCT 26.6 (L) 10/31/2024     (L) 10/31/2024         Component Value Date/Time    K 3.7 10/31/2024 0543     (H) 10/31/2024 0543    CO2 28 10/31/2024 0543    BUN 13 10/31/2024 0543    CREATININE 0.69 10/31/2024 0543         Component Value Date/Time    CALCIUM 7.9 (L) 10/31/2024 0543    ALKPHOS 76 10/22/2024 1021    AST 12 (L) 10/22/2024 1021    ALT 9 10/22/2024 1021    TP 6.7 10/22/2024 1021    ALB 3.8 10/22/2024 1021

## 2024-10-31 NOTE — PLAN OF CARE
Problem: INFECTION - ADULT  Goal: Absence or prevention of progression during hospitalization  Description: INTERVENTIONS:  - Assess and monitor for signs and symptoms of infection  - Monitor lab/diagnostic results  - Monitor all insertion sites, i.e. indwelling lines, tubes, and drains  - Monitor endotracheal if appropriate and nasal secretions for changes in amount and color  - [place] appropriate cooling/warming therapies per order  - Administer medications as ordered  - Instruct and encourage patient and family to use good hand hygiene technique  - Identify and instruct in appropriate isolation precautions for identified infection/condition  Outcome: Progressing  Goal: Absence of fever/infection during neutropenic period  Description: INTERVENTIONS:  - Monitor WBC    Outcome: Progressing     Problem: PAIN - ADULT  Goal: Verbalizes/displays adequate comfort level or baseline comfort level  Description: Interventions:  - Encourage patient to monitor pain and request assistance  - Assess pain using appropriate pain scale  - Administer analgesics based on type and severity of pain and evaluate response  - Implement non-pharmacological measures as appropriate and evaluate response  - Consider cultural and social influences on pain and pain management  - Notify physician/advanced practitioner if interventions unsuccessful or patient reports new pain  Outcome: Progressing

## 2024-10-31 NOTE — PROGRESS NOTES
Progress Note - GYN Oncology   Name: Nallely Peacock 74 y.o. female I MRN: [id number]  Unit/Bed#: [facility] 611-01 I Date of Admission: 10/29/2024   Date of Service: 10/31/2024 I Hospital Day: 1    Assessment & Plan  Endometrial cancer (HCC)  74-year-old postoperative day 2 status post robotic assisted total laparoscopic hysterectomy, bilateral salpingo-oophorectomy, bilateral suboptimal periaortic lymph node dissection/debulking.  1.  Epidural and shore removed yesterday  2.  Plan for discharge today.  Hyperthyroidism  Continue Tapazole  Acute blood loss anemia  Immediate postoperative hemoglobin 9.3 g/dL from a preoperative value of 10.5 >8.8>8.3. S/p epidural removal.   Lab Results   Component Value Date    WBC 3.46 (L) 10/31/2024    HGB 8.3 (L) 10/31/2024    HCT 26.6 (L) 10/31/2024     (H) 10/31/2024     (L) 10/31/2024         24 Hour Events : Epidural and shore removed.  Subjective : She reports she does not have any pain at this time. She has been tolerating PO, voiding and passing gas. Has not had a BM. No nausea, vomiting, chest pain, shortness of breath.      Objective :  Temp:  [98.2 °F (36.8 °C)-99.2 °F (37.3 °C)] 98.3 °F (36.8 °C)  HR:  [] 100  BP: (114-123)/(59-72) 119/65  Resp:  [17-18] 17  SpO2:  [96 %-100 %] 96 %  O2 Device: None (Room air)  Urine output 1 L overnight, 1.5 mL/kg/h.  Urine is clear.    Physical Exam  Constitutional:       General: She is not in acute distress.     Appearance: She is not ill-appearing, toxic-appearing or diaphoretic.   Pulmonary:      Effort: Pulmonary effort is normal.   Abdominal:      General: There is no distension.      Palpations: Abdomen is soft.      Tenderness: There is abdominal tenderness (mild). There is no guarding or rebound.   Skin:     General: Skin is warm and dry.      Comments: Incisions are clean dry and intact   Neurological:      General: No focal deficit present.      Mental Status: She is alert and oriented to person, place, and  time.   Psychiatric:         Mood and Affect: Mood normal.         Behavior: Behavior normal.         Lab Results: I have reviewed the following results:CBC/BMP:   .     10/30/24  0642 10/30/24  1348 10/31/24  0543   WBC 5.48   < > 3.46*   HGB 8.8*   < > 8.3*   HCT 28.0*   < > 26.6*   *   < > 106*   SODIUM 142  --   --    K 3.9  --   --    *  --   --    CO2 27  --   --    BUN 13  --   --    CREATININE 0.80  --   --    GLUC 88  --   --    MG 2.0  --   --     < > = values in this interval not displayed.        Imaging Results Review: No pertinent imaging studies reviewed.  Other Study Results Review: Other studies reviewed include: BMP    VTE Pharmacologic Prophylaxis: Reason for no pharmacologic prophylaxis ABLA  VTE Mechanical Prophylaxis: sequential compression device    Sheryl Ruiz MD  PGY-4  10/31/2024  6:36 AM

## 2024-10-31 NOTE — ASSESSMENT & PLAN NOTE
74-year-old postoperative day 1 status post robotic assisted total laparoscopic hysterectomy, bilateral salpingo-oophorectomy, bilateral suboptimal periaortic lymph node dissection/debulking.  1.  Epidural and shore removed yesterday  2.  Plan for discharge today.

## 2024-11-01 ENCOUNTER — TRANSITIONAL CARE MANAGEMENT (OUTPATIENT)
Dept: FAMILY MEDICINE CLINIC | Facility: HOSPITAL | Age: 74
End: 2024-11-01

## 2024-11-04 NOTE — PROGRESS NOTES
Pt declined TCM at this time, she is following up with DR Jacome and doesn't think she needs an apt with us.  Will call back if anything changes.

## 2024-11-06 ENCOUNTER — TELEPHONE (OUTPATIENT)
Age: 74
End: 2024-11-06

## 2024-11-06 NOTE — TELEPHONE ENCOUNTER
Unfortunately when the prescription initially was sent it said 3 times a day, but it should only be one 5 mg tablet daily.

## 2024-11-06 NOTE — TELEPHONE ENCOUNTER
Patient call she stated that she use to take three tablets of the  methimazole now she has been instructed  take one tablet by mouth she want to know how many should she take.

## 2024-11-07 NOTE — TELEPHONE ENCOUNTER
Patient called in about this.  Informed her of provider's reply.  She said that she has been taking the medication 3 times per day for about a month.  Please advise if she should have labs? Any other instructions?

## 2024-11-07 NOTE — TELEPHONE ENCOUNTER
Spoke to patient. She will take Methimazole 5 mg daily. She did have lab work completed a week ago and would like to know if that is sufficient

## 2024-11-07 NOTE — TELEPHONE ENCOUNTER
I do see there was thyroid lab work completed right before her surgery.  At that point TSH was still suppressed.  I would recommend rechecking levels at the end of this month.

## 2024-11-08 NOTE — TELEPHONE ENCOUNTER
I was able to call the patient and make her aware of the provider's review and recommendations at this time and the patient gave verbal understanding

## 2024-11-14 ENCOUNTER — OFFICE VISIT (OUTPATIENT)
Age: 74
End: 2024-11-14

## 2024-11-14 VITALS
OXYGEN SATURATION: 99 % | WEIGHT: 127 LBS | DIASTOLIC BLOOD PRESSURE: 80 MMHG | HEIGHT: 64 IN | BODY MASS INDEX: 21.68 KG/M2 | RESPIRATION RATE: 18 BRPM | TEMPERATURE: 97.8 F | SYSTOLIC BLOOD PRESSURE: 162 MMHG | HEART RATE: 107 BPM

## 2024-11-14 DIAGNOSIS — E05.90 HYPERTHYROIDISM: ICD-10-CM

## 2024-11-14 DIAGNOSIS — C54.1 ENDOMETRIAL CANCER (HCC): Primary | ICD-10-CM

## 2024-11-14 PROCEDURE — 99024 POSTOP FOLLOW-UP VISIT: CPT | Performed by: OBSTETRICS & GYNECOLOGY

## 2024-11-14 NOTE — PROGRESS NOTES
Name: Nallely Peacock      : 1950      MRN: [id number]  Encounter Provider: Lavon Jacome MD  Encounter Date: 2024   Encounter department: [facility] GYNECOLOGY ONCOLOGY [facility]       Cancer Staging   Endometrial cancer (HCC)  Staging form: Corpus Uteri - Carcinoma, AJCC 8th Edition  - Clinical: FIGO Stage IIIC2 - Signed by Mansoor Alonso MD on 2024  - Pathologic: No stage assigned - Signed by Mansoor Alonso MD on 2024  :  Assessment & Plan  Endometrial cancer (HCC)  74-year-old with stage III C2 high-grade endometrial cancer extensive pelvic and periaortic lymphadenopathy who received 3 cycles of neoadjuvant carboplatin at AUC 4, Taxol 135 mg/m², dostarlimab 500 mg every 21 days.  She had measurable response and then had a robotic assisted total laparoscopic hysterectomy, bilateral salpingo-oophorectomy, periaortic lymph node dissections on 10/29/2024.  The lymph node debulking was suboptimal given extensive involvement of major vasculature.  She is recovering well from surgery.  Her performance status is 0.  1.  Plan to continue chemotherapy treatment with carboplatin, Taxol, dostarlimab as outlined above provided that her hematologic and metabolic parameters are adequate.  2.  Return as per chemotherapy calendar.  Postoperative pelvic examination will be performed at her next prechemo visit.  3.  I discussed ongoing activity limitations       Hyperthyroidism  Continue to trend TSH while on immunotherapy treatment           History of Present Illness   Reason for Visit / CC:  Post-op Visit  Who returns for postoperative evaluation.  She is ambulating, voiding, having bowel movements.  No interval change in medications or medical history since her surgery.  She is not having any vaginal bleeding.  She does not require pain medication.  She is tolerating regular diet.      Nallely Peacock is a 74 y.o. female   Pertinent Medical  History      Oncology History   Oncology History   Endometrial cancer (HCC)   7/18/2024 Initial Diagnosis    Endometrial cancer (HCC)     8/13/2024 -  Chemotherapy    alteplase (CATHFLO), 2 mg, Intracatheter, Every 1 Minute as needed, 3 of 12 cycles  palonosetron (ALOXI), 0.25 mg, Intravenous, Once, 3 of 6 cycles  Administration: 0.25 mg (8/13/2024), 0.25 mg (9/3/2024), 0.25 mg (10/2/2024)  fosaprepitant (EMEND) IVPB, 150 mg, Intravenous, Once, 3 of 6 cycles  Administration: 150 mg (8/13/2024), 150 mg (9/3/2024), 150 mg (10/2/2024)  dostarlimab-gxly (JEMPERLI) IVPB, 500 mg, Intravenous, Once, 3 of 12 cycles  Administration: 500 mg (8/13/2024), 500 mg (9/3/2024), 500 mg (10/2/2024)  CARBOplatin (PARAPLATIN) IVPB (GOG AUC DOSING), 389 mg, Intravenous, Once, 3 of 6 cycles  Administration: 389 mg (8/13/2024), 392.5 mg (9/3/2024), 308.4 mg (10/2/2024)  PACLItaxel (TAXOL) chemo IVPB, 135 mg/m2 = 211.8 mg (77.1 % of original dose 175 mg/m2), Intravenous, Once, 3 of 6 cycles  Dose modification: 135 mg/m2 (original dose 175 mg/m2, Cycle 1, Reason: Other (Must fill in a comment), Comment: prescribed starting dose)  Administration: 211.8 mg (8/13/2024), 211.8 mg (9/3/2024), 211.8 mg (10/2/2024)     8/29/2024 -  Cancer Staged    clinical stage IIIC2 disease (retroperitoneal and aortocaval lymphadenopathy) vs IVB (possible bowel mesentery disease) based on imaging  Staging form: Corpus Uteri - Carcinoma, AJCC 8th Edition  - Clinical: FIGO Stage IIIC2 - Signed by Mansoor Alonso MD on 8/29/2024  Histopathologic type: Endometrioid adenocarcinoma, NOS  Stage prefix: Initial diagnosis  Histologic grade (G): G3  Histologic grading system: 3 grade system  Specimen type: Biopsy / Limited Resection       10/29/2024 Surgery    ROBOTIC TOTAL LAPAROSCOPIC HYSTERECTOMY. BILATERAL SALPINGO-OOPHERECTOMY  Bilateral - BILATERAL PARAAORTIC LYMPH NODE DISSECTION. DEBULKING  -Suboptimal debulking of periaortic lymph nodes.  Final pathology  "consistent with metastatic disease with extensive therapy effects, high-grade malignancy.  Final stains are pending.          Review of Systems A complete review of systems is negative other than that noted above in the HPI.  Current Outpatient Medications on File Prior to Visit   Medication Sig Dispense Refill    amLODIPine (NORVASC) 10 mg tablet Take 1 tablet (10 mg total) by mouth daily 90 tablet 3    calcium carbonate (Calcium 600) 600 MG tablet Take 600 mg by mouth 2 (two) times a day with meals      diphenhydrAMINE HCl (BENADRYL PO) Take by mouth      ibuprofen (MOTRIN) 600 mg tablet Take 1 tablet (600 mg total) by mouth every 6 (six) hours as needed for mild pain      lidocaine (LIDODERM) 5 % Apply 1 patch topically over 12 hours daily Remove & Discard patch within 12 hours or as directed by MD 5 patch 0    methimazole (TAPAZOLE) 5 mg tablet Take 1 tablet (5 mg total) by mouth in the morning 90 tablet 1    Multiple Vitamin (MULTIVITAMIN ADULT PO) Take 1 tablet by mouth daily      simvastatin (ZOCOR) 10 mg tablet Take 1 tablet (10 mg total) by mouth daily at bedtime 90 tablet 3    acetaminophen (TYLENOL) 160 MG chewable tablet Chew 160 mg every 6 (six) hours as needed for mild pain (Patient not taking: Reported on 9/19/2024)      LORazepam (ATIVAN) 1 mg tablet Take 1 tablet (1 mg total) by mouth every 8 (eight) hours as needed (nausea or anxiety) (Patient not taking: Reported on 10/25/2024) 20 tablet 0    ondansetron (ZOFRAN) 8 mg tablet Take 1 tablet (8 mg total) by mouth every 8 (eight) hours as needed for nausea or vomiting (Patient not taking: Reported on 10/25/2024) 30 tablet 1     No current facility-administered medications on file prior to visit.         Objective   /80 (BP Location: Left arm, Patient Position: Sitting, Cuff Size: Standard)   Pulse (!) 107   Temp 97.8 °F (36.6 °C) (Temporal)   Resp 18   Ht 5' 4\" (1.626 m)   Wt 57.6 kg (127 lb)   LMP  (LMP Unknown)   SpO2 99%   BMI 21.80 " "kg/m²     Blood pressure 162/80, pulse (!) 107, temperature 97.8 °F (36.6 °C), temperature source Temporal, resp. rate 18, height 5' 4\" (1.626 m), weight 57.6 kg (127 lb), SpO2 99%.  Body mass index is 21.8 kg/m².  ECOG    Physical Exam  Vitals reviewed.   Constitutional:       General: She is not in acute distress.     Appearance: Normal appearance.   HENT:      Head: Normocephalic and atraumatic.      Mouth/Throat:      Mouth: Mucous membranes are moist.   Pulmonary:      Effort: Pulmonary effort is normal.      Breath sounds: Normal breath sounds.   Abdominal:      Palpations: Abdomen is soft. There is no mass.      Tenderness: There is no abdominal tenderness.   Skin:     General: Skin is warm and dry.      Comments: Surgical trocar sites are intact, clean and dry without induration, erythema or purulent drainage.    Neurological:      Mental Status: She is alert and oriented to person, place, and time.   Psychiatric:         Mood and Affect: Mood normal.         Behavior: Behavior normal.         Thought Content: Thought content normal.         Judgment: Judgment normal.          Labs: I have reviewed pertinent labs.   Lab Results   Component Value Date     6.4 10/22/2024     Lab Results   Component Value Date    K 3.7 10/31/2024     (H) 10/31/2024    CO2 28 10/31/2024    BUN 13 10/31/2024    CREATININE 0.69 10/31/2024    GLUF 80 10/01/2024    CALCIUM 7.9 (L) 10/31/2024    CORRECTEDCA 10.2 (H) 08/08/2024    AST 12 (L) 10/22/2024    ALT 9 10/22/2024    ALKPHOS 76 10/22/2024    EGFR 84 10/31/2024     Lab Results   Component Value Date    WBC 3.46 (L) 10/31/2024    HGB 8.3 (L) 10/31/2024    HCT 26.6 (L) 10/31/2024     (H) 10/31/2024     (L) 10/31/2024     Lab Results   Component Value Date    NEUTROABS 2.70 10/22/2024        Trend:  Lab Results   Component Value Date     6.4 10/22/2024     6.9 10/08/2024     6.4 10/01/2024     9.6 09/12/2024     33.7 " 08/29/2024     61.1 (H) 08/22/2024     49.8 (H) 08/08/2024       Radiology Results Review : No pertinent imaging studies reviewed.  Pathology: I have reviewed pathology reports described above.

## 2024-11-14 NOTE — ASSESSMENT & PLAN NOTE
74-year-old with stage III C2 high-grade endometrial cancer extensive pelvic and periaortic lymphadenopathy who received 3 cycles of neoadjuvant carboplatin at AUC 4, Taxol 135 mg/m², dostarlimab 500 mg every 21 days.  She had measurable response and then had a robotic assisted total laparoscopic hysterectomy, bilateral salpingo-oophorectomy, periaortic lymph node dissections on 10/29/2024.  The lymph node debulking was suboptimal given extensive involvement of major vasculature.  She is recovering well from surgery.  Her performance status is 0.  1.  Plan to continue chemotherapy treatment with carboplatin, Taxol, dostarlimab as outlined above provided that her hematologic and metabolic parameters are adequate.  2.  Return as per chemotherapy calendar.  Postoperative pelvic examination will be performed at her next prechemo visit.  3.  I discussed ongoing activity limitations

## 2024-11-15 DIAGNOSIS — R68.89 OTHER GENERAL SYMPTOMS AND SIGNS: ICD-10-CM

## 2024-11-15 DIAGNOSIS — C54.1 ENDOMETRIAL CANCER (HCC): Primary | ICD-10-CM

## 2024-11-20 ENCOUNTER — TELEPHONE (OUTPATIENT)
Age: 74
End: 2024-11-20

## 2024-11-21 ENCOUNTER — DOCUMENTATION (OUTPATIENT)
Dept: HEMATOLOGY ONCOLOGY | Facility: CLINIC | Age: 74
End: 2024-11-21

## 2024-11-21 NOTE — PROGRESS NOTES
In-basket message received from Dr. Alonso to add patient to the next available gyn [facility]CC on 12/2/2024. Chart reviewed and prep completed.

## 2024-12-02 ENCOUNTER — DOCUMENTATION (OUTPATIENT)
Dept: GYNECOLOGIC ONCOLOGY | Facility: CLINIC | Age: 74
End: 2024-12-02

## 2024-12-02 ENCOUNTER — TELEPHONE (OUTPATIENT)
Dept: FAMILY MEDICINE CLINIC | Facility: HOSPITAL | Age: 74
End: 2024-12-02

## 2024-12-02 ENCOUNTER — APPOINTMENT (OUTPATIENT)
Dept: LAB | Facility: HOSPITAL | Age: 74
End: 2024-12-02
Payer: MEDICARE

## 2024-12-02 DIAGNOSIS — R68.89 OTHER GENERAL SYMPTOMS AND SIGNS: ICD-10-CM

## 2024-12-02 DIAGNOSIS — C54.1 ENDOMETRIAL CANCER (HCC): ICD-10-CM

## 2024-12-02 LAB
ALBUMIN SERPL BCG-MCNC: 4.1 G/DL (ref 3.5–5)
ALP SERPL-CCNC: 495 U/L (ref 34–104)
ALT SERPL W P-5'-P-CCNC: 61 U/L (ref 7–52)
AMYLASE SERPL-CCNC: 50 IU/L (ref 29–103)
ANION GAP SERPL CALCULATED.3IONS-SCNC: 10 MMOL/L (ref 4–13)
AST SERPL W P-5'-P-CCNC: 40 U/L (ref 13–39)
BASOPHILS # BLD AUTO: 0.03 THOUSANDS/ΜL (ref 0–0.1)
BASOPHILS NFR BLD AUTO: 1 % (ref 0–1)
BILIRUB SERPL-MCNC: 0.78 MG/DL (ref 0.2–1)
BUN SERPL-MCNC: 17 MG/DL (ref 5–25)
CALCIUM SERPL-MCNC: 9.9 MG/DL (ref 8.4–10.2)
CANCER AG125 SERPL-ACNC: 12.8 U/ML (ref 0–35)
CHLORIDE SERPL-SCNC: 104 MMOL/L (ref 96–108)
CO2 SERPL-SCNC: 26 MMOL/L (ref 21–32)
CREAT SERPL-MCNC: 0.78 MG/DL (ref 0.6–1.3)
EOSINOPHIL # BLD AUTO: 0.05 THOUSAND/ΜL (ref 0–0.61)
EOSINOPHIL NFR BLD AUTO: 1 % (ref 0–6)
ERYTHROCYTE [DISTWIDTH] IN BLOOD BY AUTOMATED COUNT: 13.2 % (ref 11.6–15.1)
GFR SERPL CREATININE-BSD FRML MDRD: 74 ML/MIN/1.73SQ M
GLUCOSE P FAST SERPL-MCNC: 83 MG/DL (ref 65–99)
HCT VFR BLD AUTO: 38 % (ref 34.8–46.1)
HGB BLD-MCNC: 11.9 G/DL (ref 11.5–15.4)
IMM GRANULOCYTES # BLD AUTO: 0.02 THOUSAND/UL (ref 0–0.2)
IMM GRANULOCYTES NFR BLD AUTO: 1 % (ref 0–2)
LIPASE SERPL-CCNC: 55 U/L (ref 11–82)
LYMPHOCYTES # BLD AUTO: 0.64 THOUSANDS/ΜL (ref 0.6–4.47)
LYMPHOCYTES NFR BLD AUTO: 16 % (ref 14–44)
MAGNESIUM SERPL-MCNC: 2.2 MG/DL (ref 1.9–2.7)
MCH RBC QN AUTO: 32.2 PG (ref 26.8–34.3)
MCHC RBC AUTO-ENTMCNC: 31.3 G/DL (ref 31.4–37.4)
MCV RBC AUTO: 103 FL (ref 82–98)
MONOCYTES # BLD AUTO: 0.39 THOUSAND/ΜL (ref 0.17–1.22)
MONOCYTES NFR BLD AUTO: 10 % (ref 4–12)
NEUTROPHILS # BLD AUTO: 2.98 THOUSANDS/ΜL (ref 1.85–7.62)
NEUTS SEG NFR BLD AUTO: 71 % (ref 43–75)
NRBC BLD AUTO-RTO: 0 /100 WBCS
PLATELET # BLD AUTO: 220 THOUSANDS/UL (ref 149–390)
PMV BLD AUTO: 10.2 FL (ref 8.9–12.7)
POTASSIUM SERPL-SCNC: 4.2 MMOL/L (ref 3.5–5.3)
PROT SERPL-MCNC: 7.1 G/DL (ref 6.4–8.4)
RBC # BLD AUTO: 3.69 MILLION/UL (ref 3.81–5.12)
SODIUM SERPL-SCNC: 140 MMOL/L (ref 135–147)
T4 FREE SERPL-MCNC: 1.15 NG/DL (ref 0.61–1.12)
TSH SERPL DL<=0.05 MIU/L-ACNC: <0.01 UIU/ML (ref 0.45–4.5)
WBC # BLD AUTO: 4.11 THOUSAND/UL (ref 4.31–10.16)

## 2024-12-02 PROCEDURE — 82150 ASSAY OF AMYLASE: CPT

## 2024-12-02 PROCEDURE — 85025 COMPLETE CBC W/AUTO DIFF WBC: CPT

## 2024-12-02 PROCEDURE — 84443 ASSAY THYROID STIM HORMONE: CPT

## 2024-12-02 PROCEDURE — 36415 COLL VENOUS BLD VENIPUNCTURE: CPT

## 2024-12-02 PROCEDURE — 84439 ASSAY OF FREE THYROXINE: CPT

## 2024-12-02 PROCEDURE — 86304 IMMUNOASSAY TUMOR CA 125: CPT

## 2024-12-02 PROCEDURE — 83735 ASSAY OF MAGNESIUM: CPT

## 2024-12-02 PROCEDURE — 83690 ASSAY OF LIPASE: CPT

## 2024-12-02 PROCEDURE — 80053 COMPREHEN METABOLIC PANEL: CPT

## 2024-12-02 NOTE — PROGRESS NOTES
Multidisciplinary Gynecologic Oncology Tumor Case Review       Physician Recommended Plan     Nallely Peacock is a 74 y.o. female     Diagnosis: Pathology status post surgical debulking is positive for high grade residual undifferentiated malignancy with extensive necrosis and treatment effect      Patient was discussed at the Multidisciplinary Gynecologic Oncology Case review on 12/2/2024. The group recommended to consider continuation of current treatment with taxol/carboplatin and dostarlimab.     Follow-up appointment with Rosario Mares PA-C on 12/19/2024.     NCCN guidelines were available for review.     The final treatment plan will be left to the discretion of the patient and the treating physician.       DISCLAIMERS:    TO THE TREATING PHYSICIAN:  This conference is a meeting of clinicians from various specialty areas who evaluate and discuss patients for whom a multidisciplinary treatment approach is being considered. Please note that the above opinion was a consensus of the conference attendees and is intended only to assist in quality care of the discussed patient.  The responsibility for follow up on the input given during the conference, along with any final decisions regarding plan of care, is that of the patient and the patient's provider. Accordingly, appointments have only been recommended based on this information and have NOT been scheduled unless otherwise noted.      TO THE PATIENT:  This summary is a brief record of major aspects of your cancer treatment. You may choose to share a copy with any of your doctors or nurses. However, this is not a detailed or comprehensive record of your care.

## 2024-12-02 NOTE — TELEPHONE ENCOUNTER
Patient currently in the outpatient waiting room at [facility] waiting for lab work.    Patient reported to the outpatient registration that she fell on 11/28/2024.  Her foot got caught on a chair leg.    Patient has visible neck/head injuries - including R orbital bone bruising.  Patient reports her left side is very painful.  Patient is not on blood thinners.  Patient has resorted to using a walker since the fall.  Patient was very out of breath when she arrived at outpatient registration.  It is assumed that patient drove herself today.    Patient was advised to the ED by the outpatient staff at [facility].  Patient does not want to go to ED.    Staff at [facility] was advised to tell patient that PCP office also recommends going to ED to be evaluated especially since she hit her head.    Sending to pcp and dr khan (since pcp not in office today)

## 2024-12-02 NOTE — TELEPHONE ENCOUNTER
Please call. With her fall and ongoing pain I think it would be best she go to the ER for immediate evaluation ( from what I gather she will need xrays)

## 2024-12-03 ENCOUNTER — TELEPHONE (OUTPATIENT)
Age: 74
End: 2024-12-03

## 2024-12-03 RX ORDER — SODIUM CHLORIDE 9 MG/ML
20 INJECTION, SOLUTION INTRAVENOUS ONCE
Status: CANCELLED | OUTPATIENT
Start: 2024-12-04

## 2024-12-03 RX ORDER — PALONOSETRON 0.05 MG/ML
0.25 INJECTION, SOLUTION INTRAVENOUS ONCE
Status: CANCELLED | OUTPATIENT
Start: 2024-12-04

## 2024-12-03 NOTE — TELEPHONE ENCOUNTER
Patient calling in with a question regarding her upcoming chemo infusions. She states she had her first 3 already and she wants to know for these next upcoming treatments if any of the drugs/doses have changed. Please call her at [phone].     Attempted to transfer to [facility] with no success.

## 2024-12-03 NOTE — TELEPHONE ENCOUNTER
Nallely called in requesting to reschedule her 12/26/24 infusion appointment to a different day, please call her to reschedule at [phone]

## 2024-12-03 NOTE — TELEPHONE ENCOUNTER
Spoke to patient. She says no symptoms caused her to fall, it was pure clumsiness. She had added folding chairs to her table for Thanksgiving company and she tripped over the leg of one of those chairs.   She also says she has been fine since the fall, no symptoms at all.

## 2024-12-03 NOTE — TELEPHONE ENCOUNTER
Return call placed Nallely is wanting to know if there are any changes to her treatment plan and informed that her infusion medications are the same as she was given prior to surgery, with no changes to her regimen.

## 2024-12-03 NOTE — TELEPHONE ENCOUNTER
Returned call to the patient for follow up on her question concerning her treatment regimen. There was no answer and no vm set up to leave a message.  I will forward this attempt to the clinical team.

## 2024-12-04 ENCOUNTER — HOSPITAL ENCOUNTER (OUTPATIENT)
Dept: INFUSION CENTER | Facility: HOSPITAL | Age: 74
Discharge: HOME | End: 2024-12-04
Attending: OBSTETRICS & GYNECOLOGY
Payer: MEDICARE

## 2024-12-04 VITALS
HEART RATE: 60 BPM | TEMPERATURE: 97.9 F | WEIGHT: 126.1 LBS | BODY MASS INDEX: 21.53 KG/M2 | RESPIRATION RATE: 18 BRPM | SYSTOLIC BLOOD PRESSURE: 128 MMHG | DIASTOLIC BLOOD PRESSURE: 71 MMHG | HEIGHT: 64 IN | OXYGEN SATURATION: 98 %

## 2024-12-04 DIAGNOSIS — C54.1 ENDOMETRIAL CANCER (HCC): Primary | ICD-10-CM

## 2024-12-04 RX ORDER — PALONOSETRON 0.05 MG/ML
0.25 INJECTION, SOLUTION INTRAVENOUS ONCE
Status: COMPLETED | OUTPATIENT
Start: 2024-12-04 | End: 2024-12-04

## 2024-12-04 RX ORDER — SODIUM CHLORIDE 9 MG/ML
20 INJECTION, SOLUTION INTRAVENOUS ONCE
Status: COMPLETED | OUTPATIENT
Start: 2024-12-04 | End: 2024-12-04

## 2024-12-04 RX ADMIN — DIPHENHYDRAMINE HYDROCHLORIDE 25 MG: 50 INJECTION, SOLUTION INTRAMUSCULAR; INTRAVENOUS at 08:44

## 2024-12-04 RX ADMIN — DEXAMETHASONE SODIUM PHOSPHATE 20 MG: 100 INJECTION INTRAMUSCULAR; INTRAVENOUS at 08:20

## 2024-12-04 RX ADMIN — PALONOSETRON HYDROCHLORIDE 0.25 MG: 0.25 INJECTION INTRAVENOUS at 08:22

## 2024-12-04 RX ADMIN — FAMOTIDINE 20 MG: 10 INJECTION INTRAVENOUS at 09:09

## 2024-12-04 RX ADMIN — CARBOPLATIN 323.2 MG: 600 INJECTION, SOLUTION INTRAVENOUS at 15:33

## 2024-12-04 RX ADMIN — FOSAPREPITANT 150 MG: 150 INJECTION, POWDER, LYOPHILIZED, FOR SOLUTION INTRAVENOUS at 09:34

## 2024-12-04 RX ADMIN — PACLITAXEL 217.2 MG: 6 INJECTION, SOLUTION INTRAVENOUS at 12:30

## 2024-12-04 RX ADMIN — SODIUM CHLORIDE 500 MG: 9 INJECTION, SOLUTION INTRAVENOUS at 10:17

## 2024-12-04 RX ADMIN — SODIUM CHLORIDE 20 ML/HR: 0.9 INJECTION, SOLUTION INTRAVENOUS at 08:18

## 2024-12-04 NOTE — PROGRESS NOTES
Nallely Peacock  tolerated treatment well with no complications.      Nallely Peacock is aware of future appt on 12/27/24 at 0730.     AVS printed and given to Nallely Peacock:  No (Declined by Nallely Peacock)

## 2024-12-06 PROBLEM — Z45.2 ENCOUNTER FOR CENTRAL LINE CARE: Status: ACTIVE | Noted: 2024-12-06

## 2024-12-09 ENCOUNTER — HOSPITAL ENCOUNTER (OUTPATIENT)
Dept: INFUSION CENTER | Facility: HOSPITAL | Age: 74
Discharge: HOME | End: 2024-12-09
Payer: MEDICARE

## 2024-12-09 DIAGNOSIS — Z45.2 ENCOUNTER FOR CENTRAL LINE CARE: Primary | ICD-10-CM

## 2024-12-09 DIAGNOSIS — C54.1 ENDOMETRIAL CANCER (HCC): ICD-10-CM

## 2024-12-09 LAB
ALBUMIN SERPL BCG-MCNC: 3.8 G/DL (ref 3.5–5)
ALP SERPL-CCNC: 350 U/L (ref 34–104)
ALT SERPL W P-5'-P-CCNC: 32 U/L (ref 7–52)
ANION GAP SERPL CALCULATED.3IONS-SCNC: 10 MMOL/L (ref 4–13)
AST SERPL W P-5'-P-CCNC: 25 U/L (ref 13–39)
BASOPHILS # BLD AUTO: 0.02 THOUSANDS/ÂΜL (ref 0–0.1)
BASOPHILS NFR BLD AUTO: 1 % (ref 0–1)
BILIRUB SERPL-MCNC: 0.73 MG/DL (ref 0.2–1)
BUN SERPL-MCNC: 16 MG/DL (ref 5–25)
CALCIUM SERPL-MCNC: 8.6 MG/DL (ref 8.4–10.2)
CHLORIDE SERPL-SCNC: 102 MMOL/L (ref 96–108)
CO2 SERPL-SCNC: 24 MMOL/L (ref 21–32)
CREAT SERPL-MCNC: 0.72 MG/DL (ref 0.6–1.3)
EOSINOPHIL # BLD AUTO: 0.03 THOUSAND/ÂΜL (ref 0–0.61)
EOSINOPHIL NFR BLD AUTO: 1 % (ref 0–6)
ERYTHROCYTE [DISTWIDTH] IN BLOOD BY AUTOMATED COUNT: 12.6 % (ref 11.6–15.1)
GFR SERPL CREATININE-BSD FRML MDRD: 81 ML/MIN/1.73SQ M
GLUCOSE SERPL-MCNC: 117 MG/DL (ref 65–140)
HCT VFR BLD AUTO: 33.9 % (ref 34.8–46.1)
HGB BLD-MCNC: 11 G/DL (ref 11.5–15.4)
IMM GRANULOCYTES # BLD AUTO: 0.03 THOUSAND/UL (ref 0–0.2)
IMM GRANULOCYTES NFR BLD AUTO: 1 % (ref 0–2)
LYMPHOCYTES # BLD AUTO: 0.57 THOUSANDS/ÂΜL (ref 0.6–4.47)
LYMPHOCYTES NFR BLD AUTO: 14 % (ref 14–44)
MAGNESIUM SERPL-MCNC: 1.9 MG/DL (ref 1.9–2.7)
MCH RBC QN AUTO: 31.9 PG (ref 26.8–34.3)
MCHC RBC AUTO-ENTMCNC: 32.4 G/DL (ref 31.4–37.4)
MCV RBC AUTO: 98 FL (ref 82–98)
MONOCYTES # BLD AUTO: 0.05 THOUSAND/ÂΜL (ref 0.17–1.22)
MONOCYTES NFR BLD AUTO: 1 % (ref 4–12)
NEUTROPHILS # BLD AUTO: 3.32 THOUSANDS/ÂΜL (ref 1.85–7.62)
NEUTS SEG NFR BLD AUTO: 82 % (ref 43–75)
NRBC BLD AUTO-RTO: 0 /100 WBCS
PLATELET # BLD AUTO: 177 THOUSANDS/UL (ref 149–390)
PMV BLD AUTO: 9.8 FL (ref 8.9–12.7)
POTASSIUM SERPL-SCNC: 4 MMOL/L (ref 3.5–5.3)
PROT SERPL-MCNC: 7 G/DL (ref 6.4–8.4)
RBC # BLD AUTO: 3.45 MILLION/UL (ref 3.81–5.12)
SODIUM SERPL-SCNC: 136 MMOL/L (ref 135–147)
WBC # BLD AUTO: 4.02 THOUSAND/UL (ref 4.31–10.16)

## 2024-12-09 PROCEDURE — 83735 ASSAY OF MAGNESIUM: CPT

## 2024-12-09 PROCEDURE — 80053 COMPREHEN METABOLIC PANEL: CPT

## 2024-12-09 PROCEDURE — 85025 COMPLETE CBC W/AUTO DIFF WBC: CPT

## 2024-12-09 NOTE — PROGRESS NOTES
Nallely Peacock  tolerated treatment well with no complications.      Nallely Peacock is aware of future appt on 12/16 at 1230.     AVS printed and given to Nallely Peacock:    No (Declined by Nallely Peacock)

## 2024-12-16 ENCOUNTER — HOSPITAL ENCOUNTER (OUTPATIENT)
Dept: INFUSION CENTER | Facility: HOSPITAL | Age: 74
Discharge: HOME | End: 2024-12-16
Payer: MEDICARE

## 2024-12-16 DIAGNOSIS — C54.1 ENDOMETRIAL CANCER (HCC): ICD-10-CM

## 2024-12-16 DIAGNOSIS — Z45.2 ENCOUNTER FOR CENTRAL LINE CARE: Primary | ICD-10-CM

## 2024-12-16 LAB
ALBUMIN SERPL BCG-MCNC: 3.7 G/DL (ref 3.5–5)
ALP SERPL-CCNC: 265 U/L (ref 34–104)
ALT SERPL W P-5'-P-CCNC: 17 U/L (ref 7–52)
ANION GAP SERPL CALCULATED.3IONS-SCNC: 10 MMOL/L (ref 4–13)
AST SERPL W P-5'-P-CCNC: 18 U/L (ref 13–39)
BASOPHILS # BLD AUTO: 0.02 THOUSANDS/ÂΜL (ref 0–0.1)
BASOPHILS NFR BLD AUTO: 1 % (ref 0–1)
BILIRUB SERPL-MCNC: 0.36 MG/DL (ref 0.2–1)
BUN SERPL-MCNC: 14 MG/DL (ref 5–25)
CALCIUM SERPL-MCNC: 8.8 MG/DL (ref 8.4–10.2)
CHLORIDE SERPL-SCNC: 104 MMOL/L (ref 96–108)
CO2 SERPL-SCNC: 27 MMOL/L (ref 21–32)
CREAT SERPL-MCNC: 0.73 MG/DL (ref 0.6–1.3)
EOSINOPHIL # BLD AUTO: 0.05 THOUSAND/ÂΜL (ref 0–0.61)
EOSINOPHIL NFR BLD AUTO: 3 % (ref 0–6)
ERYTHROCYTE [DISTWIDTH] IN BLOOD BY AUTOMATED COUNT: 12.5 % (ref 11.6–15.1)
GFR SERPL CREATININE-BSD FRML MDRD: 80 ML/MIN/1.73SQ M
GLUCOSE SERPL-MCNC: 108 MG/DL (ref 65–140)
HCT VFR BLD AUTO: 30.1 % (ref 34.8–46.1)
HGB BLD-MCNC: 9.9 G/DL (ref 11.5–15.4)
IMM GRANULOCYTES # BLD AUTO: 0.01 THOUSAND/UL (ref 0–0.2)
IMM GRANULOCYTES NFR BLD AUTO: 1 % (ref 0–2)
LYMPHOCYTES # BLD AUTO: 0.55 THOUSANDS/ÂΜL (ref 0.6–4.47)
LYMPHOCYTES NFR BLD AUTO: 30 % (ref 14–44)
MAGNESIUM SERPL-MCNC: 2.1 MG/DL (ref 1.9–2.7)
MCH RBC QN AUTO: 31.8 PG (ref 26.8–34.3)
MCHC RBC AUTO-ENTMCNC: 32.9 G/DL (ref 31.4–37.4)
MCV RBC AUTO: 97 FL (ref 82–98)
MONOCYTES # BLD AUTO: 0.33 THOUSAND/ÂΜL (ref 0.17–1.22)
MONOCYTES NFR BLD AUTO: 18 % (ref 4–12)
NEUTROPHILS # BLD AUTO: 0.89 THOUSANDS/ÂΜL (ref 1.85–7.62)
NEUTS SEG NFR BLD AUTO: 47 % (ref 43–75)
NRBC BLD AUTO-RTO: 0 /100 WBCS
PLATELET # BLD AUTO: 189 THOUSANDS/UL (ref 149–390)
PMV BLD AUTO: 9.8 FL (ref 8.9–12.7)
POTASSIUM SERPL-SCNC: 3.7 MMOL/L (ref 3.5–5.3)
PROT SERPL-MCNC: 6.5 G/DL (ref 6.4–8.4)
RBC # BLD AUTO: 3.11 MILLION/UL (ref 3.81–5.12)
SODIUM SERPL-SCNC: 141 MMOL/L (ref 135–147)
WBC # BLD AUTO: 1.85 THOUSAND/UL (ref 4.31–10.16)

## 2024-12-16 PROCEDURE — 83735 ASSAY OF MAGNESIUM: CPT

## 2024-12-16 PROCEDURE — 85025 COMPLETE CBC W/AUTO DIFF WBC: CPT

## 2024-12-16 PROCEDURE — 80053 COMPREHEN METABOLIC PANEL: CPT

## 2024-12-16 NOTE — PROGRESS NOTES
Nallely Peacock  tolerated treatment well with no complications.      Nallely Peacock is aware of future appt on 12/23/24 at 1130.     AVS printed and given to Nallely Peacock:    No (Declined by Nallely Peacock)

## 2024-12-19 ENCOUNTER — OFFICE VISIT (OUTPATIENT)
Age: 74
End: 2024-12-19
Payer: MEDICARE

## 2024-12-19 VITALS
OXYGEN SATURATION: 100 % | DIASTOLIC BLOOD PRESSURE: 70 MMHG | HEIGHT: 64 IN | SYSTOLIC BLOOD PRESSURE: 140 MMHG | WEIGHT: 128 LBS | BODY MASS INDEX: 21.85 KG/M2 | RESPIRATION RATE: 18 BRPM | HEART RATE: 85 BPM | TEMPERATURE: 98 F

## 2024-12-19 DIAGNOSIS — C54.1 ENDOMETRIAL CANCER (HCC): Primary | ICD-10-CM

## 2024-12-19 DIAGNOSIS — D70.2 DRUG-INDUCED NEUTROPENIA (HCC): ICD-10-CM

## 2024-12-19 PROCEDURE — 99215 OFFICE O/P EST HI 40 MIN: CPT | Performed by: PHYSICIAN ASSISTANT

## 2024-12-19 PROCEDURE — G2211 COMPLEX E/M VISIT ADD ON: HCPCS | Performed by: PHYSICIAN ASSISTANT

## 2024-12-19 NOTE — ASSESSMENT & PLAN NOTE
Carboplatin previously dose-reduce to AUC 4.   Repeat CBC/Diff planned prior to next cycle.   If persistent or borderline neutropenia, will consider treatment delay with addition of GSF.     Lab Results   Component Value Date    NEUTROABS 0.89 (L) 12/16/2024    NEUTROABS 3.32 12/09/2024    NEUTROABS 2.98 12/02/2024

## 2024-12-19 NOTE — PROGRESS NOTES
Name: Nallely Peacock      : 1950      MRN: [id number]  Encounter Provider: Rosario Mares PA-C  Encounter Date: 2024   Encounter department: [facility] [place] GYNECOLOGY ONCOLOGY [facility]  :  Assessment & Plan  Endometrial cancer (HCC)  Stage IIIC2 high-grade endometrial cancer with extensive pelvic and periaortic lymph adenopathy s/p neoadjuvant chemotherapy with taxol/carboplatin/dostarlimab and sub-optimal debulking currently receiving adjuvant chemotherapy with taxol 135 mg/m2, carboplatin AUC 4 and dostarlimab 500 mg IV every 21 days. Overall, she is tolerating treatment well. She has chemotherapy-induced neutropenia.     Proceed with cycle 5 of treatment as planned as long as her hematologic and metabolic parameters are adequate.     Request submitted for somatic testing.     Return to the office as per her chemotherapy calendar. Plan for repeat imaging after completion of cycle 6. Vaginal cuff exam to be performed at her next visit to assess post-operative healing.   Orders:  •  [facility] MI Tumor Seek Hybrid™ + IHCs and Other Tests by Tumor Type (MI Profile Comprehensive Testing)    Drug-induced neutropenia (HCC)  Carboplatin previously dose-reduce to AUC 4.   Repeat CBC/Diff planned prior to next cycle.   If persistent or borderline neutropenia, will consider treatment delay with addition of GSF.     Lab Results   Component Value Date    NEUTROABS 0.89 (L) 2024    NEUTROABS 3.32 2024    NEUTROABS 2.98 2024                    History of Present Illness     Reason for Visit / CC:  Pre-Chemo Visit    Nallely Peacock is a 74 y.o. female   who presents to the office for pre-chemotherapy evaluation. Overall, she is tolerating treatment well and without new complaints. She has been afebrile. Denies n/v/abdominal pain. Appetite is appropriate. Normal bowel/bladder function. Denies diarrhea, blood in stool. No skin rash, new cough/SOB. Patient denies  chemotherapy-induced neuropathy. Denies vaginal bleeding.         Oncology History   Oncology History   Endometrial cancer (HCC)   7/18/2024 Initial Diagnosis    Endometrial cancer (HCC)     8/13/2024 -  Chemotherapy    Taxol 135 mg/m2, carboplatin AUC 4, dostarlimab 500 mg IV every 21 days.     Carboplatin dose-reduced with cycle 3 from AUC 5 to AUC 4 d/t neutropenia.       8/29/2024 -  Cancer Staged    clinical stage IIIC2 disease (retroperitoneal and aortocaval lymphadenopathy) vs IVB (possible bowel mesentery disease) based on imaging  Staging form: Corpus Uteri - Carcinoma, AJCC 8th Edition  - Clinical: FIGO Stage IIIC2 - Signed by Mansoor Alonso MD on 8/29/2024  Histopathologic type: Endometrioid adenocarcinoma, NOS  Stage prefix: Initial diagnosis  Histologic grade (G): G3  Histologic grading system: 3 grade system  Specimen type: Biopsy / Limited Resection       10/29/2024 Surgery    ROBOTIC TOTAL LAPAROSCOPIC HYSTERECTOMY. BILATERAL SALPINGO-OOPHERECTOMY  Bilateral - BILATERAL PARAAORTIC LYMPH NODE DISSECTION. DEBULKING  -Suboptimal debulking of periaortic lymph nodes.  Final pathology consistent with metastatic disease with extensive therapy effects, high-grade malignancy.  Final stains are pending.          Review of Systems   Constitutional:  Positive for fatigue. Negative for fever.   HENT: Negative.     Eyes: Negative.    Respiratory: Negative.     Cardiovascular: Negative.    Gastrointestinal: Negative.    Genitourinary: Negative.    Musculoskeletal: Negative.    Skin: Negative.    Neurological: Negative.  Negative for numbness.   Psychiatric/Behavioral: Negative.      A complete review of systems is negative other than that noted above in the HPI.  Medical History Reviewed by provider this encounter:  Tobacco  Allergies  Meds  Problems  Med Hx  Surg Hx  Fam Hx     .  Current Outpatient Medications on File Prior to Visit   Medication Sig Dispense Refill   • amLODIPine (NORVASC) 10 mg  "tablet Take 1 tablet (10 mg total) by mouth daily 90 tablet 3   • calcium carbonate (Calcium 600) 600 MG tablet Take 600 mg by mouth 2 (two) times a day with meals     • diphenhydrAMINE HCl (BENADRYL PO) Take by mouth     • ibuprofen (MOTRIN) 600 mg tablet Take 1 tablet (600 mg total) by mouth every 6 (six) hours as needed for mild pain     • lidocaine (LIDODERM) 5 % Apply 1 patch topically over 12 hours daily Remove & Discard patch within 12 hours or as directed by MD 5 patch 0   • Multiple Vitamin (MULTIVITAMIN ADULT PO) Take 1 tablet by mouth daily     • simvastatin (ZOCOR) 10 mg tablet Take 1 tablet (10 mg total) by mouth daily at bedtime 90 tablet 3   • acetaminophen (TYLENOL) 160 MG chewable tablet Chew 160 mg every 6 (six) hours as needed for mild pain (Patient not taking: Reported on 9/19/2024)     • LORazepam (ATIVAN) 1 mg tablet Take 1 tablet (1 mg total) by mouth every 8 (eight) hours as needed (nausea or anxiety) (Patient not taking: Reported on 10/25/2024) 20 tablet 0   • methimazole (TAPAZOLE) 5 mg tablet Take 1 tablet (5 mg total) by mouth in the morning 90 tablet 1   • ondansetron (ZOFRAN) 8 mg tablet Take 1 tablet (8 mg total) by mouth every 8 (eight) hours as needed for nausea or vomiting (Patient not taking: Reported on 10/25/2024) 30 tablet 1     Current Facility-Administered Medications on File Prior to Visit   Medication Dose Route Frequency Provider Last Rate Last Admin   • [DISCONTINUED] alteplase (CATHFLO) injection 2 mg  2 mg Intracatheter Q1MIN PRN Lavon Jacome MD             Objective   /70 (BP Location: Left arm, Patient Position: Sitting, Cuff Size: Standard)   Pulse 85   Temp 98 °F (36.7 °C) (Temporal)   Resp 18   Ht 5' 4.02\" (1.626 m)   Wt 58.1 kg (128 lb)   LMP  (LMP Unknown)   SpO2 100%   BMI 21.96 kg/m²     Body mass index is 21.96 kg/m².  ECOG ECOG Performance Status: 1 - Restricted in physically strenuous activity but ambulatory and able to carry out " work of a light or sedentary nature, e.g., light house work, office work     Physical Exam  Vitals reviewed.   Constitutional:       General: She is not in acute distress.     Appearance: Normal appearance. She is not ill-appearing.   HENT:      Head: Normocephalic and atraumatic.      Mouth/Throat:      Mouth: Mucous membranes are moist.   Eyes:      General: No scleral icterus.        Right eye: No discharge.         Left eye: No discharge.      Conjunctiva/sclera: Conjunctivae normal.   Pulmonary:      Effort: Pulmonary effort is normal.   Musculoskeletal:      Right lower leg: No edema.      Left lower leg: No edema.   Skin:     General: Skin is warm and dry.      Coloration: Skin is not jaundiced.      Findings: No rash.   Neurological:      General: No focal deficit present.      Mental Status: She is alert and oriented to person, place, and time.      Cranial Nerves: No cranial nerve deficit.      Sensory: No sensory deficit.      Motor: No weakness.      Gait: Gait normal.   Psychiatric:         Mood and Affect: Mood normal.         Behavior: Behavior normal.         Thought Content: Thought content normal.         Judgment: Judgment normal.          Labs: I have reviewed pertinent labs.   Lab Results   Component Value Date/Time    WBC 1.85 (L) 12/16/2024 12:30 PM    RBC 3.11 (L) 12/16/2024 12:30 PM    Hemoglobin 9.9 (L) 12/16/2024 12:30 PM    Hematocrit 30.1 (L) 12/16/2024 12:30 PM    MCV 97 12/16/2024 12:30 PM    MCH 31.8 12/16/2024 12:30 PM    RDW 12.5 12/16/2024 12:30 PM    Platelets 189 12/16/2024 12:30 PM    Segmented % 47 12/16/2024 12:30 PM    Lymphocytes % 30 12/16/2024 12:30 PM    Monocytes % 18 (H) 12/16/2024 12:30 PM    Eosinophils Relative 3 12/16/2024 12:30 PM    Basophils Relative 1 12/16/2024 12:30 PM    Immature Grans % 1 12/16/2024 12:30 PM    Absolute Neutrophils 0.89 (L) 12/16/2024 12:30 PM      Lab Results   Component Value Date/Time    Sodium 141 12/16/2024 12:30 PM    Potassium 3.7  12/16/2024 12:30 PM    Chloride 104 12/16/2024 12:30 PM    CO2 27 12/16/2024 12:30 PM    ANION GAP 10 12/16/2024 12:30 PM    BUN 14 12/16/2024 12:30 PM    Creatinine 0.73 12/16/2024 12:30 PM    Glucose 108 12/16/2024 12:30 PM    Glucose, Fasting 83 12/02/2024 11:10 AM    Calcium 8.8 12/16/2024 12:30 PM    Corrected Calcium 10.2 (H) 08/08/2024 08:24 AM    AST 18 12/16/2024 12:30 PM    ALT 17 12/16/2024 12:30 PM    Alkaline Phosphatase 265 (H) 12/16/2024 12:30 PM    Total Protein 6.5 12/16/2024 12:30 PM    Albumin 3.7 12/16/2024 12:30 PM    Total Bilirubin 0.36 12/16/2024 12:30 PM    eGFR 80 12/16/2024 12:30 PM

## 2024-12-19 NOTE — ASSESSMENT & PLAN NOTE
Stage IIIC2 high-grade endometrial cancer with extensive pelvic and periaortic lymph adenopathy s/p neoadjuvant chemotherapy with taxol/carboplatin/dostarlimab and sub-optimal debulking currently receiving adjuvant chemotherapy with taxol 135 mg/m2, carboplatin AUC 4 and dostarlimab 500 mg IV every 21 days. Overall, she is tolerating treatment well. She has chemotherapy-induced neutropenia.     Proceed with cycle 5 of treatment as planned as long as her hematologic and metabolic parameters are adequate.     Request submitted for somatic testing.     Return to the office as per her chemotherapy calendar. Plan for repeat imaging after completion of cycle 6. Vaginal cuff exam to be performed at her next visit to assess post-operative healing.   Orders:  •  [facility] Tumor Seek Hybrid™ + IHCs and Other Tests by Tumor Type (MI Profile Comprehensive Testing)

## 2024-12-23 ENCOUNTER — HOSPITAL ENCOUNTER (OUTPATIENT)
Dept: INFUSION CENTER | Facility: HOSPITAL | Age: 74
Discharge: HOME | End: 2024-12-23
Payer: MEDICARE

## 2024-12-23 DIAGNOSIS — R68.89 OTHER GENERAL SYMPTOMS AND SIGNS: ICD-10-CM

## 2024-12-23 DIAGNOSIS — Z45.2 ENCOUNTER FOR CENTRAL LINE CARE: Primary | ICD-10-CM

## 2024-12-23 DIAGNOSIS — E05.90 HYPERTHYROIDISM: ICD-10-CM

## 2024-12-23 DIAGNOSIS — C54.1 ENDOMETRIAL CANCER (HCC): ICD-10-CM

## 2024-12-23 LAB
ALBUMIN SERPL BCG-MCNC: 3.8 G/DL (ref 3.5–5)
ALP SERPL-CCNC: 241 U/L (ref 34–104)
ALT SERPL W P-5'-P-CCNC: 19 U/L (ref 7–52)
AMYLASE SERPL-CCNC: 63 IU/L (ref 29–103)
ANION GAP SERPL CALCULATED.3IONS-SCNC: 9 MMOL/L (ref 4–13)
AST SERPL W P-5'-P-CCNC: 24 U/L (ref 13–39)
BASOPHILS # BLD AUTO: 0.03 THOUSANDS/ÂΜL (ref 0–0.1)
BASOPHILS NFR BLD AUTO: 1 % (ref 0–1)
BILIRUB SERPL-MCNC: 0.41 MG/DL (ref 0.2–1)
BUN SERPL-MCNC: 13 MG/DL (ref 5–25)
CALCIUM SERPL-MCNC: 8.6 MG/DL (ref 8.4–10.2)
CANCER AG125 SERPL-ACNC: 6.1 U/ML (ref 0–35)
CHLORIDE SERPL-SCNC: 106 MMOL/L (ref 96–108)
CO2 SERPL-SCNC: 25 MMOL/L (ref 21–32)
CREAT SERPL-MCNC: 0.74 MG/DL (ref 0.6–1.3)
EOSINOPHIL # BLD AUTO: 0.04 THOUSAND/ÂΜL (ref 0–0.61)
EOSINOPHIL NFR BLD AUTO: 1 % (ref 0–6)
ERYTHROCYTE [DISTWIDTH] IN BLOOD BY AUTOMATED COUNT: 12.8 % (ref 11.6–15.1)
GFR SERPL CREATININE-BSD FRML MDRD: 78 ML/MIN/1.73SQ M
GLUCOSE SERPL-MCNC: 98 MG/DL (ref 65–140)
HCT VFR BLD AUTO: 32.8 % (ref 34.8–46.1)
HGB BLD-MCNC: 10.6 G/DL (ref 11.5–15.4)
IMM GRANULOCYTES # BLD AUTO: 0.02 THOUSAND/UL (ref 0–0.2)
IMM GRANULOCYTES NFR BLD AUTO: 1 % (ref 0–2)
LIPASE SERPL-CCNC: 56 U/L (ref 11–82)
LYMPHOCYTES # BLD AUTO: 0.54 THOUSANDS/ÂΜL (ref 0.6–4.47)
LYMPHOCYTES NFR BLD AUTO: 20 % (ref 14–44)
MAGNESIUM SERPL-MCNC: 2.1 MG/DL (ref 1.9–2.7)
MCH RBC QN AUTO: 31.8 PG (ref 26.8–34.3)
MCHC RBC AUTO-ENTMCNC: 32.3 G/DL (ref 31.4–37.4)
MCV RBC AUTO: 99 FL (ref 82–98)
MONOCYTES # BLD AUTO: 0.4 THOUSAND/ÂΜL (ref 0.17–1.22)
MONOCYTES NFR BLD AUTO: 15 % (ref 4–12)
NEUTROPHILS # BLD AUTO: 1.73 THOUSANDS/ÂΜL (ref 1.85–7.62)
NEUTS SEG NFR BLD AUTO: 62 % (ref 43–75)
NRBC BLD AUTO-RTO: 0 /100 WBCS
PLATELET # BLD AUTO: 179 THOUSANDS/UL (ref 149–390)
PMV BLD AUTO: 9.1 FL (ref 8.9–12.7)
POTASSIUM SERPL-SCNC: 3.9 MMOL/L (ref 3.5–5.3)
PROT SERPL-MCNC: 6.6 G/DL (ref 6.4–8.4)
RBC # BLD AUTO: 3.33 MILLION/UL (ref 3.81–5.12)
SODIUM SERPL-SCNC: 140 MMOL/L (ref 135–147)
T3FREE SERPL-MCNC: 3.34 PG/ML (ref 2.5–3.9)
T4 FREE SERPL-MCNC: 0.73 NG/DL (ref 0.61–1.12)
T4 FREE SERPL-MCNC: 0.77 NG/DL (ref 0.61–1.12)
TSH SERPL DL<=0.05 MIU/L-ACNC: 0.03 UIU/ML (ref 0.45–4.5)
WBC # BLD AUTO: 2.76 THOUSAND/UL (ref 4.31–10.16)

## 2024-12-23 PROCEDURE — 84439 ASSAY OF FREE THYROXINE: CPT

## 2024-12-23 PROCEDURE — 84443 ASSAY THYROID STIM HORMONE: CPT

## 2024-12-23 PROCEDURE — 82150 ASSAY OF AMYLASE: CPT

## 2024-12-23 PROCEDURE — 84481 FREE ASSAY (FT-3): CPT

## 2024-12-23 PROCEDURE — 85025 COMPLETE CBC W/AUTO DIFF WBC: CPT

## 2024-12-23 PROCEDURE — 83690 ASSAY OF LIPASE: CPT

## 2024-12-23 PROCEDURE — 80053 COMPREHEN METABOLIC PANEL: CPT

## 2024-12-23 PROCEDURE — 83735 ASSAY OF MAGNESIUM: CPT

## 2024-12-23 PROCEDURE — 86304 IMMUNOASSAY TUMOR CA 125: CPT

## 2024-12-23 NOTE — PROGRESS NOTES
Nallely Peacock  tolerated treatment well with no complications.      Nallely Peacock is aware of future appt on 12/27/2024 at 1230.     AVS printed and given to Nallely Peacock:    No (Declined by Nallely Peacock)

## 2024-12-24 ENCOUNTER — RESULTS FOLLOW-UP (OUTPATIENT)
Dept: ENDOCRINOLOGY | Facility: HOSPITAL | Age: 74
End: 2024-12-24

## 2024-12-24 RX ORDER — PALONOSETRON 0.05 MG/ML
0.25 INJECTION, SOLUTION INTRAVENOUS ONCE
Status: CANCELLED | OUTPATIENT
Start: 2024-12-27

## 2024-12-24 RX ORDER — SODIUM CHLORIDE 9 MG/ML
20 INJECTION, SOLUTION INTRAVENOUS ONCE
Status: CANCELLED | OUTPATIENT
Start: 2024-12-27

## 2024-12-27 ENCOUNTER — HOSPITAL ENCOUNTER (OUTPATIENT)
Dept: INFUSION CENTER | Facility: HOSPITAL | Age: 74
End: 2024-12-27
Attending: OBSTETRICS & GYNECOLOGY
Payer: MEDICARE

## 2024-12-27 VITALS
BODY MASS INDEX: 22.17 KG/M2 | OXYGEN SATURATION: 100 % | TEMPERATURE: 98.3 F | HEIGHT: 64 IN | RESPIRATION RATE: 17 BRPM | SYSTOLIC BLOOD PRESSURE: 109 MMHG | DIASTOLIC BLOOD PRESSURE: 71 MMHG | HEART RATE: 74 BPM | WEIGHT: 129.85 LBS

## 2024-12-27 DIAGNOSIS — C54.1 ENDOMETRIAL CANCER (HCC): Primary | ICD-10-CM

## 2024-12-27 PROCEDURE — 96413 CHEMO IV INFUSION 1 HR: CPT

## 2024-12-27 PROCEDURE — 96367 TX/PROPH/DG ADDL SEQ IV INF: CPT

## 2024-12-27 PROCEDURE — 96415 CHEMO IV INFUSION ADDL HR: CPT

## 2024-12-27 PROCEDURE — 96375 TX/PRO/DX INJ NEW DRUG ADDON: CPT

## 2024-12-27 PROCEDURE — 96417 CHEMO IV INFUS EACH ADDL SEQ: CPT

## 2024-12-27 RX ORDER — PALONOSETRON 0.05 MG/ML
0.25 INJECTION, SOLUTION INTRAVENOUS ONCE
Status: COMPLETED | OUTPATIENT
Start: 2024-12-27 | End: 2024-12-27

## 2024-12-27 RX ORDER — SODIUM CHLORIDE 9 MG/ML
20 INJECTION, SOLUTION INTRAVENOUS ONCE
Status: COMPLETED | OUTPATIENT
Start: 2024-12-27 | End: 2024-12-27

## 2024-12-27 RX ADMIN — DEXAMETHASONE SODIUM PHOSPHATE 20 MG: 100 INJECTION INTRAMUSCULAR; INTRAVENOUS at 07:43

## 2024-12-27 RX ADMIN — FAMOTIDINE 20 MG: 10 INJECTION INTRAVENOUS at 08:34

## 2024-12-27 RX ADMIN — FOSAPREPITANT 150 MG: 150 INJECTION, POWDER, LYOPHILIZED, FOR SOLUTION INTRAVENOUS at 08:56

## 2024-12-27 RX ADMIN — CARBOPLATIN 335.2 MG: 600 INJECTION, SOLUTION INTRAVENOUS at 14:38

## 2024-12-27 RX ADMIN — PALONOSETRON HYDROCHLORIDE 0.25 MG: 0.25 INJECTION INTRAVENOUS at 07:46

## 2024-12-27 RX ADMIN — SODIUM CHLORIDE 20 ML/HR: 0.9 INJECTION, SOLUTION INTRAVENOUS at 07:42

## 2024-12-27 RX ADMIN — SODIUM CHLORIDE 500 MG: 9 INJECTION, SOLUTION INTRAVENOUS at 09:36

## 2024-12-27 RX ADMIN — DIPHENHYDRAMINE HYDROCHLORIDE 25 MG: 50 INJECTION, SOLUTION INTRAMUSCULAR; INTRAVENOUS at 08:08

## 2024-12-27 RX ADMIN — PACLITAXEL 217.2 MG: 6 INJECTION, SOLUTION INTRAVENOUS at 11:44

## 2024-12-27 NOTE — PROGRESS NOTES
Nallely Peacock  tolerated treatment well with no complications.      Nallely Peacock is aware of future appt on 12/30 at 1530.     AVS printed and given to Nallely Peacock:  Yes

## 2024-12-30 ENCOUNTER — HOSPITAL ENCOUNTER (OUTPATIENT)
Dept: INFUSION CENTER | Facility: HOSPITAL | Age: 74
Discharge: HOME | End: 2024-12-30
Payer: MEDICARE

## 2024-12-30 ENCOUNTER — TELEPHONE (OUTPATIENT)
Age: 74
End: 2024-12-30

## 2024-12-30 DIAGNOSIS — C54.1 ENDOMETRIAL CANCER (HCC): ICD-10-CM

## 2024-12-30 DIAGNOSIS — Z45.2 ENCOUNTER FOR CENTRAL LINE CARE: Primary | ICD-10-CM

## 2024-12-30 LAB
ALBUMIN SERPL BCG-MCNC: 3.8 G/DL (ref 3.5–5)
ALP SERPL-CCNC: 182 U/L (ref 34–104)
ALT SERPL W P-5'-P-CCNC: 19 U/L (ref 7–52)
ANION GAP SERPL CALCULATED.3IONS-SCNC: 10 MMOL/L (ref 4–13)
AST SERPL W P-5'-P-CCNC: 20 U/L (ref 13–39)
BASOPHILS # BLD AUTO: 0.02 THOUSANDS/ΜL (ref 0–0.1)
BASOPHILS NFR BLD AUTO: 1 % (ref 0–1)
BILIRUB SERPL-MCNC: 0.63 MG/DL (ref 0.2–1)
BUN SERPL-MCNC: 19 MG/DL (ref 5–25)
CALCIUM SERPL-MCNC: 9 MG/DL (ref 8.4–10.2)
CHLORIDE SERPL-SCNC: 104 MMOL/L (ref 96–108)
CO2 SERPL-SCNC: 24 MMOL/L (ref 21–32)
CREAT SERPL-MCNC: 0.77 MG/DL (ref 0.6–1.3)
EOSINOPHIL # BLD AUTO: 0.04 THOUSAND/ΜL (ref 0–0.61)
EOSINOPHIL NFR BLD AUTO: 1 % (ref 0–6)
ERYTHROCYTE [DISTWIDTH] IN BLOOD BY AUTOMATED COUNT: 13.4 % (ref 11.6–15.1)
GFR SERPL CREATININE-BSD FRML MDRD: 75 ML/MIN/1.73SQ M
GLUCOSE SERPL-MCNC: 103 MG/DL (ref 65–140)
HCT VFR BLD AUTO: 34.4 % (ref 34.8–46.1)
HGB BLD-MCNC: 10.9 G/DL (ref 11.5–15.4)
IMM GRANULOCYTES # BLD AUTO: 0.02 THOUSAND/UL (ref 0–0.2)
IMM GRANULOCYTES NFR BLD AUTO: 1 % (ref 0–2)
LYMPHOCYTES # BLD AUTO: 0.48 THOUSANDS/ΜL (ref 0.6–4.47)
LYMPHOCYTES NFR BLD AUTO: 12 % (ref 14–44)
MAGNESIUM SERPL-MCNC: 2 MG/DL (ref 1.9–2.7)
MCH RBC QN AUTO: 31.1 PG (ref 26.8–34.3)
MCHC RBC AUTO-ENTMCNC: 31.7 G/DL (ref 31.4–37.4)
MCV RBC AUTO: 98 FL (ref 82–98)
MONOCYTES # BLD AUTO: 0.08 THOUSAND/ΜL (ref 0.17–1.22)
MONOCYTES NFR BLD AUTO: 2 % (ref 4–12)
NEUTROPHILS # BLD AUTO: 3.34 THOUSANDS/ΜL (ref 1.85–7.62)
NEUTS SEG NFR BLD AUTO: 83 % (ref 43–75)
NRBC BLD AUTO-RTO: 0 /100 WBCS
PLATELET # BLD AUTO: 115 THOUSANDS/UL (ref 149–390)
PMV BLD AUTO: 9.8 FL (ref 8.9–12.7)
POTASSIUM SERPL-SCNC: 4 MMOL/L (ref 3.5–5.3)
PROT SERPL-MCNC: 6.6 G/DL (ref 6.4–8.4)
RBC # BLD AUTO: 3.5 MILLION/UL (ref 3.81–5.12)
SODIUM SERPL-SCNC: 138 MMOL/L (ref 135–147)
WBC # BLD AUTO: 3.98 THOUSAND/UL (ref 4.31–10.16)

## 2024-12-30 PROCEDURE — 83735 ASSAY OF MAGNESIUM: CPT

## 2024-12-30 PROCEDURE — 80053 COMPREHEN METABOLIC PANEL: CPT

## 2024-12-30 PROCEDURE — 85025 COMPLETE CBC W/AUTO DIFF WBC: CPT

## 2024-12-30 NOTE — PROGRESS NOTES
Nallely Peacock  tolerated treatment well with no complications.  1/6  Nallely Peacock is aware of future appt on 1/6 at 1430.     AVS printed and given to Nallely Peacock:    No (Declined by Nallely Peacock)

## 2024-12-30 NOTE — TELEPHONE ENCOUNTER
Patient calling.    Can catheter maintenance be done sooner today?  Too late in the day for travel.    Please call [phone]

## 2025-01-06 ENCOUNTER — HOSPITAL ENCOUNTER (OUTPATIENT)
Dept: INFUSION CENTER | Facility: HOSPITAL | Age: 75
End: 2025-01-06

## 2025-01-06 LAB
CARIS ER: NEGATIVE
CARIS GENOMIC LOH - EXOME: NORMAL
CARIS HER2/NEU: NEGATIVE
CARIS HLA-A: NORMAL
CARIS HLA-B: NORMAL
CARIS HLA-C: NORMAL
CARIS MISMATCH REPAIR STATUS: NORMAL
CARIS MSH2: NORMAL
CARIS MSH6: NORMAL
CARIS MSI - EXOME: NORMAL
CARIS PD-L1 (SP142): NEGATIVE
CARIS PR: NEGATIVE
CARIS TMB - EXOME: NORMAL

## 2025-01-09 ENCOUNTER — OFFICE VISIT (OUTPATIENT)
Age: 75
End: 2025-01-09
Payer: MEDICARE

## 2025-01-09 VITALS
DIASTOLIC BLOOD PRESSURE: 70 MMHG | SYSTOLIC BLOOD PRESSURE: 128 MMHG | RESPIRATION RATE: 18 BRPM | HEART RATE: 98 BPM | BODY MASS INDEX: 22.2 KG/M2 | HEIGHT: 64 IN | TEMPERATURE: 98.1 F | OXYGEN SATURATION: 100 % | WEIGHT: 130 LBS

## 2025-01-09 DIAGNOSIS — D70.2 DRUG-INDUCED NEUTROPENIA (HCC): ICD-10-CM

## 2025-01-09 DIAGNOSIS — C54.1 ENDOMETRIAL CANCER (HCC): Primary | ICD-10-CM

## 2025-01-09 PROCEDURE — 99215 OFFICE O/P EST HI 40 MIN: CPT | Performed by: PHYSICIAN ASSISTANT

## 2025-01-09 NOTE — ASSESSMENT & PLAN NOTE
Stage IIIC2 high-grade endometrial cancer (favor germ cell) with extensive pelvic and periaortic lymph adenopathy s/p neoadjuvant chemotherapy with taxol/carboplatin/dostarlimab and sub-optimal debulking currently receiving adjuvant chemotherapy with taxol 135 mg/m2, carboplatin AUC 4 and dostarlimab 500 mg IV every 21 days. Overall, she is tolerating treatment well.      Proceed with cycle 6 of treatment as planned as long as her hematologic and metabolic parameters are adequate.      Plan for CT chest/abdomen/pelvis after completion of cycle 6. Return to the office as per her chemotherapy calendar.      Orders:  •  CT chest abdomen pelvis w contrast; Future

## 2025-01-09 NOTE — PROGRESS NOTES
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 Overall, she is tolerating treatment well.      Proceed with cycle 6 of treatment as planned as long as her hematologic and metabolic parameters are adequate.      Plan for CT chest/abdomen/pelvis after completion of cycle 6. Return to the office as per her chemotherapy calendar.      Orders:  •  CT chest abdomen pelvis w contrast; Future    Drug-induced neutropenia (HCC)  Carboplatin previously reduced to AUC 4.   Continue to trend CBC/Diff.               History of Present Illness   Reason for Visit / CC:  Pre-Chemo Visit    Nallely Peacock is a 74 y.o. female   who presents to the office for pre-chemotherapy evaluation. She has been afebrile and is tolerating treatment well. Te patient is without acute complaints today. Normal bowel/bladder function. Denies diarrhea or blood in stool. No n/v/abdominal pain. Appetite is appropriate. Denies skin rashes, SOB, cough. No chemotherapy-induced neuropathy.         Oncology History   Cancer Staging   Endometrial cancer (HCC)  Staging form: Corpus Uteri - Carcinoma, AJCC 8th Edition  - Clinical: FIGO Stage IIIC2 - Signed by Mansoor Alonso MD on 2024  Histopathologic type: Endometrioid adenocarcinoma, NOS  Stage prefix: Initial diagnosis  Histologic grade (G): G3  Histologic grading system: 3 grade system  Specimen type: Biopsy / Limited Resection  - Pathologic:  No stage assigned - Signed by Mansoor Alonso MD on 8/29/2024  Stage used in treatment planning: Yes  Staging comments: CT CAP with clinical stage IIIC2 disease (retroperitoneal and aortocaval lymphadenopathy) vs IVB (possible bowel mesentery disease).   Oncology History   Endometrial cancer (HCC)   7/18/2024 Initial Diagnosis    Endometrial cancer (HCC)     8/13/2024 -  Chemotherapy    Taxol 135 mg/m2, carboplatin AUC 4, dostarlimab 500 mg IV every 21 days.     Carboplatin dose-reduced with cycle 3 from AUC 5 to AUC 4 d/t neutropenia.       8/29/2024 -  Cancer Staged    clinical stage IIIC2 disease (retroperitoneal and aortocaval lymphadenopathy) vs IVB (possible bowel mesentery disease) based on imaging  Staging form: Corpus Uteri - Carcinoma, AJCC 8th Edition  - Clinical: FIGO Stage IIIC2 - Signed by Mansoor Alonso MD on 8/29/2024  Histopathologic type: Endometrioid adenocarcinoma, NOS  Stage prefix: Initial diagnosis  Histologic grade (G): G3  Histologic grading system: 3 grade system  Specimen type: Biopsy / Limited Resection       10/29/2024 Surgery    ROBOTIC TOTAL LAPAROSCOPIC HYSTERECTOMY. BILATERAL SALPINGO-OOPHERECTOMY  Bilateral - BILATERAL PARAAORTIC LYMPH NODE DISSECTION. DEBULKING  -Suboptimal debulking of periaortic lymph nodes.  Final pathology consistent with metastatic disease with extensive therapy effects, high-grade malignancy.  Final stains are pending.          Review of Systems   Constitutional: Negative.    HENT: Negative.     Eyes: Negative.    Respiratory: Negative.     Cardiovascular: Negative.    Gastrointestinal: Negative.    Genitourinary: Negative.    Musculoskeletal: Negative.    Skin: Negative.    Neurological: Negative.    Psychiatric/Behavioral: Negative.      A complete review of systems is negative other than that noted above in the HPI.  Medical History Reviewed by provider this encounter:  Tobacco  Allergies  Meds  Problems  Med Hx  Surg Hx  Fam Hx      ".  Current Outpatient Medications on File Prior to Visit   Medication Sig Dispense Refill   • amLODIPine (NORVASC) 10 mg tablet Take 1 tablet (10 mg total) by mouth daily 90 tablet 3   • calcium carbonate (Calcium 600) 600 MG tablet Take 600 mg by mouth 2 (two) times a day with meals     • diphenhydrAMINE HCl (BENADRYL PO) Take by mouth     • ibuprofen (MOTRIN) 600 mg tablet Take 1 tablet (600 mg total) by mouth every 6 (six) hours as needed for mild pain     • lidocaine (LIDODERM) 5 % Apply 1 patch topically over 12 hours daily Remove & Discard patch within 12 hours or as directed by MD 5 patch 0   • methimazole (TAPAZOLE) 5 mg tablet Take 1 tablet (5 mg total) by mouth in the morning 90 tablet 1   • Multiple Vitamin (MULTIVITAMIN ADULT PO) Take 1 tablet by mouth daily     • simvastatin (ZOCOR) 10 mg tablet Take 1 tablet (10 mg total) by mouth daily at bedtime 90 tablet 3   • acetaminophen (TYLENOL) 160 MG chewable tablet Chew 160 mg every 6 (six) hours as needed for mild pain (Patient not taking: Reported on 9/19/2024)     • LORazepam (ATIVAN) 1 mg tablet Take 1 tablet (1 mg total) by mouth every 8 (eight) hours as needed (nausea or anxiety) (Patient not taking: Reported on 10/25/2024) 20 tablet 0   • ondansetron (ZOFRAN) 8 mg tablet Take 1 tablet (8 mg total) by mouth every 8 (eight) hours as needed for nausea or vomiting (Patient not taking: Reported on 1/9/2025) 30 tablet 1     No current facility-administered medications on file prior to visit.         Objective   /70 (BP Location: Left arm, Patient Position: Sitting, Cuff Size: Standard)   Pulse 98   Temp 98.1 °F (36.7 °C) (Temporal)   Resp 18   Ht 5' 4.02\" (1.626 m)   Wt 59 kg (130 lb)   LMP  (LMP Unknown)   SpO2 100%   BMI 22.30 kg/m²     Body mass index is 22.3 kg/m².  Pain Screening:  Pain Score: 0-No pain  ECOG ECOG Performance Status: 0 - Fully active, able to carry on all pre-disease performance without restriction   Physical " Exam  Constitutional:       Appearance: She is well-developed.   Pulmonary:      Effort: Pulmonary effort is normal.   Skin:     General: Skin is warm and dry.      Findings: No rash.   Neurological:      Mental Status: She is alert and oriented to person, place, and time.   Psychiatric:         Behavior: Behavior normal.         Thought Content: Thought content normal.         Judgment: Judgment normal.          Labs: I have reviewed pertinent labs.   Lab Results   Component Value Date/Time    WBC 3.98 (L) 12/30/2024 11:22 AM    RBC 3.50 (L) 12/30/2024 11:22 AM    Hemoglobin 10.9 (L) 12/30/2024 11:22 AM    Hematocrit 34.4 (L) 12/30/2024 11:22 AM    MCV 98 12/30/2024 11:22 AM    MCH 31.1 12/30/2024 11:22 AM    RDW 13.4 12/30/2024 11:22 AM    Platelets 115 (L) 12/30/2024 11:22 AM    Segmented % 83 (H) 12/30/2024 11:22 AM    Lymphocytes % 12 (L) 12/30/2024 11:22 AM    Monocytes % 2 (L) 12/30/2024 11:22 AM    Eosinophils Relative 1 12/30/2024 11:22 AM    Basophils Relative 1 12/30/2024 11:22 AM    Immature Grans % 1 12/30/2024 11:22 AM    Absolute Neutrophils 3.34 12/30/2024 11:22 AM      Lab Results   Component Value Date/Time    Sodium 138 12/30/2024 11:22 AM    Potassium 4.0 12/30/2024 11:22 AM    Chloride 104 12/30/2024 11:22 AM    CO2 24 12/30/2024 11:22 AM    ANION GAP 10 12/30/2024 11:22 AM    BUN 19 12/30/2024 11:22 AM    Creatinine 0.77 12/30/2024 11:22 AM    Glucose 103 12/30/2024 11:22 AM    Glucose, Fasting 83 12/02/2024 11:10 AM    Calcium 9.0 12/30/2024 11:22 AM    Corrected Calcium 10.2 (H) 08/08/2024 08:24 AM    AST 20 12/30/2024 11:22 AM    ALT 19 12/30/2024 11:22 AM    Alkaline Phosphatase 182 (H) 12/30/2024 11:22 AM    Total Protein 6.6 12/30/2024 11:22 AM    Albumin 3.8 12/30/2024 11:22 AM    Total Bilirubin 0.63 12/30/2024 11:22 AM    eGFR 75 12/30/2024 11:22 AM           Trend:  Lab Results   Component Value Date     6.1 12/23/2024     12.8 12/02/2024     6.4 10/22/2024      6.9 10/08/2024     6.4 10/01/2024     9.6 09/12/2024     33.7 08/29/2024     61.1 (H) 08/22/2024     49.8 (H) 08/08/2024

## 2025-01-13 ENCOUNTER — HOSPITAL ENCOUNTER (OUTPATIENT)
Dept: INFUSION CENTER | Facility: HOSPITAL | Age: 75
Discharge: HOME | End: 2025-01-13
Payer: MEDICARE

## 2025-01-13 DIAGNOSIS — R68.89 OTHER GENERAL SYMPTOMS AND SIGNS: ICD-10-CM

## 2025-01-13 DIAGNOSIS — Z45.2 ENCOUNTER FOR CENTRAL LINE CARE: Primary | ICD-10-CM

## 2025-01-13 DIAGNOSIS — C54.1 ENDOMETRIAL CANCER (HCC): ICD-10-CM

## 2025-01-13 LAB
ALBUMIN SERPL BCG-MCNC: 3.9 G/DL (ref 3.5–5)
ALP SERPL-CCNC: 172 U/L (ref 34–104)
ALT SERPL W P-5'-P-CCNC: 12 U/L (ref 7–52)
AMYLASE SERPL-CCNC: 59 IU/L (ref 29–103)
ANION GAP SERPL CALCULATED.3IONS-SCNC: 10 MMOL/L (ref 4–13)
AST SERPL W P-5'-P-CCNC: 15 U/L (ref 13–39)
BASOPHILS # BLD AUTO: 0.02 THOUSANDS/ΜL (ref 0–0.1)
BASOPHILS NFR BLD AUTO: 1 % (ref 0–1)
BILIRUB SERPL-MCNC: 0.38 MG/DL (ref 0.2–1)
BUN SERPL-MCNC: 14 MG/DL (ref 5–25)
CALCIUM SERPL-MCNC: 9.3 MG/DL (ref 8.4–10.2)
CANCER AG125 SERPL-ACNC: 4.8 U/ML (ref 0–35)
CHLORIDE SERPL-SCNC: 105 MMOL/L (ref 96–108)
CO2 SERPL-SCNC: 25 MMOL/L (ref 21–32)
CREAT SERPL-MCNC: 0.83 MG/DL (ref 0.6–1.3)
EOSINOPHIL # BLD AUTO: 0.03 THOUSAND/ΜL (ref 0–0.61)
EOSINOPHIL NFR BLD AUTO: 2 % (ref 0–6)
ERYTHROCYTE [DISTWIDTH] IN BLOOD BY AUTOMATED COUNT: 13.4 % (ref 11.6–15.1)
GFR SERPL CREATININE-BSD FRML MDRD: 68 ML/MIN/1.73SQ M
GLUCOSE SERPL-MCNC: 99 MG/DL (ref 65–140)
HCT VFR BLD AUTO: 32.7 % (ref 34.8–46.1)
HGB BLD-MCNC: 10.4 G/DL (ref 11.5–15.4)
IMM GRANULOCYTES # BLD AUTO: 0 THOUSAND/UL (ref 0–0.2)
IMM GRANULOCYTES NFR BLD AUTO: 0 % (ref 0–2)
LIPASE SERPL-CCNC: 51 U/L (ref 11–82)
LYMPHOCYTES # BLD AUTO: 0.6 THOUSANDS/ΜL (ref 0.6–4.47)
LYMPHOCYTES NFR BLD AUTO: 35 % (ref 14–44)
MAGNESIUM SERPL-MCNC: 1.9 MG/DL (ref 1.9–2.7)
MCH RBC QN AUTO: 31 PG (ref 26.8–34.3)
MCHC RBC AUTO-ENTMCNC: 31.8 G/DL (ref 31.4–37.4)
MCV RBC AUTO: 97 FL (ref 82–98)
MONOCYTES # BLD AUTO: 0.27 THOUSAND/ΜL (ref 0.17–1.22)
MONOCYTES NFR BLD AUTO: 16 % (ref 4–12)
NEUTROPHILS # BLD AUTO: 0.81 THOUSANDS/ΜL (ref 1.85–7.62)
NEUTS SEG NFR BLD AUTO: 46 % (ref 43–75)
NRBC BLD AUTO-RTO: 0 /100 WBCS
PLATELET # BLD AUTO: 160 THOUSANDS/UL (ref 149–390)
PMV BLD AUTO: 8.7 FL (ref 8.9–12.7)
POTASSIUM SERPL-SCNC: 3.6 MMOL/L (ref 3.5–5.3)
PROT SERPL-MCNC: 6.9 G/DL (ref 6.4–8.4)
RBC # BLD AUTO: 3.36 MILLION/UL (ref 3.81–5.12)
SODIUM SERPL-SCNC: 140 MMOL/L (ref 135–147)
T4 FREE SERPL-MCNC: 0.73 NG/DL (ref 0.61–1.12)
TSH SERPL DL<=0.05 MIU/L-ACNC: 0.2 UIU/ML (ref 0.45–4.5)
WBC # BLD AUTO: 1.73 THOUSAND/UL (ref 4.31–10.16)

## 2025-01-13 PROCEDURE — 83690 ASSAY OF LIPASE: CPT

## 2025-01-13 PROCEDURE — 84439 ASSAY OF FREE THYROXINE: CPT

## 2025-01-13 PROCEDURE — 80053 COMPREHEN METABOLIC PANEL: CPT

## 2025-01-13 PROCEDURE — 85025 COMPLETE CBC W/AUTO DIFF WBC: CPT

## 2025-01-13 PROCEDURE — 82150 ASSAY OF AMYLASE: CPT

## 2025-01-13 PROCEDURE — 84443 ASSAY THYROID STIM HORMONE: CPT

## 2025-01-13 PROCEDURE — 83735 ASSAY OF MAGNESIUM: CPT

## 2025-01-13 PROCEDURE — 86304 IMMUNOASSAY TUMOR CA 125: CPT

## 2025-01-13 NOTE — PROGRESS NOTES
Nallely Peacock  tolerated treatment well with no complications.      Nallely Peacokc is aware of future appt on 1/15/25 at 0730.     AVS printed and given to Nallely Peacock:  Yes

## 2025-01-14 ENCOUNTER — DOCUMENTATION (OUTPATIENT)
Dept: GENETICS | Facility: CLINIC | Age: 75
End: 2025-01-14

## 2025-01-14 DIAGNOSIS — C54.1 ENDOMETRIAL CANCER (HCC): Primary | ICD-10-CM

## 2025-01-14 RX ORDER — PALONOSETRON 0.05 MG/ML
0.25 INJECTION, SOLUTION INTRAVENOUS ONCE
Status: CANCELLED | OUTPATIENT
Start: 2025-01-15

## 2025-01-14 RX ORDER — SODIUM CHLORIDE 9 MG/ML
20 INJECTION, SOLUTION INTRAVENOUS ONCE
Status: CANCELLED | OUTPATIENT
Start: 2025-01-15

## 2025-01-14 NOTE — PROGRESS NOTES
The Genetics Team reviewed Nallely's CARIS result and does not recommend a referral for germline genetic testing.    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

## 2025-01-15 ENCOUNTER — HOSPITAL ENCOUNTER (OUTPATIENT)
Dept: INFUSION CENTER | Facility: HOSPITAL | Age: 75
Discharge: HOME | End: 2025-01-15
Attending: OBSTETRICS & GYNECOLOGY
Payer: MEDICARE

## 2025-01-15 VITALS
WEIGHT: 130.51 LBS | DIASTOLIC BLOOD PRESSURE: 64 MMHG | SYSTOLIC BLOOD PRESSURE: 106 MMHG | BODY MASS INDEX: 22.28 KG/M2 | TEMPERATURE: 97.4 F | RESPIRATION RATE: 18 BRPM | HEART RATE: 94 BPM | HEIGHT: 64 IN | OXYGEN SATURATION: 100 %

## 2025-01-15 DIAGNOSIS — C54.1 ENDOMETRIAL CANCER (HCC): Primary | ICD-10-CM

## 2025-01-15 DIAGNOSIS — D70.2 DRUG-INDUCED NEUTROPENIA (HCC): ICD-10-CM

## 2025-01-15 LAB
BASOPHILS # BLD AUTO: 0.02 THOUSANDS/ΜL (ref 0–0.1)
BASOPHILS NFR BLD AUTO: 1 % (ref 0–1)
EOSINOPHIL # BLD AUTO: 0.03 THOUSAND/ΜL (ref 0–0.61)
EOSINOPHIL NFR BLD AUTO: 2 % (ref 0–6)
ERYTHROCYTE [DISTWIDTH] IN BLOOD BY AUTOMATED COUNT: 13.7 % (ref 11.6–15.1)
HCT VFR BLD AUTO: 33.4 % (ref 34.8–46.1)
HGB BLD-MCNC: 10.7 G/DL (ref 11.5–15.4)
IMM GRANULOCYTES # BLD AUTO: 0 THOUSAND/UL (ref 0–0.2)
IMM GRANULOCYTES NFR BLD AUTO: 0 % (ref 0–2)
LYMPHOCYTES # BLD AUTO: 0.43 THOUSANDS/ΜL (ref 0.6–4.47)
LYMPHOCYTES NFR BLD AUTO: 22 % (ref 14–44)
MCH RBC QN AUTO: 31.2 PG (ref 26.8–34.3)
MCHC RBC AUTO-ENTMCNC: 32 G/DL (ref 31.4–37.4)
MCV RBC AUTO: 97 FL (ref 82–98)
MONOCYTES # BLD AUTO: 0.21 THOUSAND/ΜL (ref 0.17–1.22)
MONOCYTES NFR BLD AUTO: 11 % (ref 4–12)
NEUTROPHILS # BLD AUTO: 1.25 THOUSANDS/ΜL (ref 1.85–7.62)
NEUTS SEG NFR BLD AUTO: 64 % (ref 43–75)
NRBC BLD AUTO-RTO: 0 /100 WBCS
PLATELET # BLD AUTO: 142 THOUSANDS/UL (ref 149–390)
PMV BLD AUTO: 8.7 FL (ref 8.9–12.7)
RBC # BLD AUTO: 3.43 MILLION/UL (ref 3.81–5.12)
WBC # BLD AUTO: 1.94 THOUSAND/UL (ref 4.31–10.16)

## 2025-01-15 PROCEDURE — 85025 COMPLETE CBC W/AUTO DIFF WBC: CPT | Performed by: OBSTETRICS & GYNECOLOGY

## 2025-01-15 RX ORDER — PALONOSETRON 0.05 MG/ML
0.25 INJECTION, SOLUTION INTRAVENOUS ONCE
Status: COMPLETED | OUTPATIENT
Start: 2025-01-15 | End: 2025-01-15

## 2025-01-15 RX ORDER — SODIUM CHLORIDE 9 MG/ML
20 INJECTION, SOLUTION INTRAVENOUS ONCE
Status: COMPLETED | OUTPATIENT
Start: 2025-01-15 | End: 2025-01-15

## 2025-01-15 RX ADMIN — PACLITAXEL 220.2 MG: 6 INJECTION, SOLUTION INTRAVENOUS at 12:16

## 2025-01-15 RX ADMIN — DIPHENHYDRAMINE HYDROCHLORIDE 25 MG: 50 INJECTION, SOLUTION INTRAMUSCULAR; INTRAVENOUS at 08:36

## 2025-01-15 RX ADMIN — CARBOPLATIN 314.8 MG: 600 INJECTION, SOLUTION INTRAVENOUS at 15:18

## 2025-01-15 RX ADMIN — DEXAMETHASONE SODIUM PHOSPHATE 20 MG: 100 INJECTION INTRAMUSCULAR; INTRAVENOUS at 08:11

## 2025-01-15 RX ADMIN — FOSAPREPITANT 150 MG: 150 INJECTION, POWDER, LYOPHILIZED, FOR SOLUTION INTRAVENOUS at 09:23

## 2025-01-15 RX ADMIN — FAMOTIDINE 20 MG: 10 INJECTION INTRAVENOUS at 08:59

## 2025-01-15 RX ADMIN — PALONOSETRON HYDROCHLORIDE 0.25 MG: 0.25 INJECTION INTRAVENOUS at 08:14

## 2025-01-15 RX ADMIN — PEGFILGRASTIM 6 MG: KIT SUBCUTANEOUS at 16:19

## 2025-01-15 RX ADMIN — SODIUM CHLORIDE 500 MG: 9 INJECTION, SOLUTION INTRAVENOUS at 10:03

## 2025-01-15 RX ADMIN — SODIUM CHLORIDE 20 ML/HR: 0.9 INJECTION, SOLUTION INTRAVENOUS at 08:11

## 2025-01-15 NOTE — PROGRESS NOTES
Nallely Peacock  tolerated treatment well with no complications.      Nallely Peacock is aware of future appt on 01/21/2025 at 01:00 PM.     AVS printed and given to Nallely Peacock:  Yes     Neulasta Onpro device applied to Pt.'s R Arm with noted green lighting functioning properly. Onpro bandage applied to secure.

## 2025-01-15 NOTE — PROGRESS NOTES
Repeat CBC results from 1/15/25 communicated to Rosario Mares PA-C via [id number] secure chat. Received ok to treat pt. Today. Neulasta onpro teaching completed with pt. Via [employer] kit.

## 2025-01-21 ENCOUNTER — OFFICE VISIT (OUTPATIENT)
Dept: ENDOCRINOLOGY | Facility: HOSPITAL | Age: 75
End: 2025-01-21
Payer: MEDICARE

## 2025-01-21 ENCOUNTER — HOSPITAL ENCOUNTER (OUTPATIENT)
Dept: INFUSION CENTER | Facility: HOSPITAL | Age: 75
Discharge: HOME | End: 2025-01-21
Payer: MEDICARE

## 2025-01-21 VITALS
SYSTOLIC BLOOD PRESSURE: 112 MMHG | DIASTOLIC BLOOD PRESSURE: 60 MMHG | OXYGEN SATURATION: 100 % | HEIGHT: 64 IN | WEIGHT: 133.6 LBS | HEART RATE: 92 BPM | BODY MASS INDEX: 22.81 KG/M2

## 2025-01-21 DIAGNOSIS — C54.1 ENDOMETRIAL CANCER (HCC): ICD-10-CM

## 2025-01-21 DIAGNOSIS — Z45.2 ENCOUNTER FOR CENTRAL LINE CARE: Primary | ICD-10-CM

## 2025-01-21 DIAGNOSIS — E05.90 HYPERTHYROIDISM: Primary | ICD-10-CM

## 2025-01-21 LAB
ALBUMIN SERPL BCG-MCNC: 3.8 G/DL (ref 3.5–5)
ALP SERPL-CCNC: 141 U/L (ref 34–104)
ALT SERPL W P-5'-P-CCNC: 12 U/L (ref 7–52)
ANION GAP SERPL CALCULATED.3IONS-SCNC: 8 MMOL/L (ref 4–13)
ANISOCYTOSIS BLD QL SMEAR: PRESENT
AST SERPL W P-5'-P-CCNC: 14 U/L (ref 13–39)
BASOPHILS # BLD MANUAL: 0 THOUSAND/UL (ref 0–0.1)
BASOPHILS NFR MAR MANUAL: 0 % (ref 0–1)
BILIRUB SERPL-MCNC: 0.41 MG/DL (ref 0.2–1)
BUN SERPL-MCNC: 16 MG/DL (ref 5–25)
CALCIUM SERPL-MCNC: 9.2 MG/DL (ref 8.4–10.2)
CHLORIDE SERPL-SCNC: 105 MMOL/L (ref 96–108)
CO2 SERPL-SCNC: 27 MMOL/L (ref 21–32)
CREAT SERPL-MCNC: 0.69 MG/DL (ref 0.6–1.3)
EOSINOPHIL # BLD MANUAL: 0.04 THOUSAND/UL (ref 0–0.4)
EOSINOPHIL NFR BLD MANUAL: 2 % (ref 0–6)
ERYTHROCYTE [DISTWIDTH] IN BLOOD BY AUTOMATED COUNT: 14.3 % (ref 11.6–15.1)
GFR SERPL CREATININE-BSD FRML MDRD: 84 ML/MIN/1.73SQ M
GLUCOSE SERPL-MCNC: 121 MG/DL (ref 65–140)
HCT VFR BLD AUTO: 31.1 % (ref 34.8–46.1)
HGB BLD-MCNC: 9.9 G/DL (ref 11.5–15.4)
LYMPHOCYTES # BLD AUTO: 0.52 THOUSAND/UL (ref 0.6–4.47)
LYMPHOCYTES # BLD AUTO: 27 % (ref 14–44)
MACROCYTES BLD QL AUTO: PRESENT
MAGNESIUM SERPL-MCNC: 1.8 MG/DL (ref 1.9–2.7)
MCH RBC QN AUTO: 31 PG (ref 26.8–34.3)
MCHC RBC AUTO-ENTMCNC: 31.8 G/DL (ref 31.4–37.4)
MCV RBC AUTO: 98 FL (ref 82–98)
MONOCYTES # BLD AUTO: 0.14 THOUSAND/UL (ref 0–1.22)
MONOCYTES NFR BLD: 7 % (ref 4–12)
MYELOCYTE ABSOLUTE CT: 0.04 THOUSAND/UL (ref 0–0.1)
MYELOCYTES NFR BLD MANUAL: 2 % (ref 0–1)
NEUTROPHILS # BLD MANUAL: 1.2 THOUSAND/UL (ref 1.85–7.62)
NEUTS BAND NFR BLD MANUAL: 1 % (ref 0–8)
NEUTS SEG NFR BLD AUTO: 61 % (ref 43–75)
OVALOCYTES BLD QL SMEAR: PRESENT
PLATELET # BLD AUTO: 83 THOUSANDS/UL (ref 149–390)
PLATELET BLD QL SMEAR: ABNORMAL
PLATELET CLUMP BLD QL SMEAR: PRESENT
PMV BLD AUTO: 9.9 FL (ref 8.9–12.7)
POIKILOCYTOSIS BLD QL SMEAR: PRESENT
POLYCHROMASIA BLD QL SMEAR: PRESENT
POTASSIUM SERPL-SCNC: 3.7 MMOL/L (ref 3.5–5.3)
PROT SERPL-MCNC: 6.5 G/DL (ref 6.4–8.4)
RBC # BLD AUTO: 3.19 MILLION/UL (ref 3.81–5.12)
RBC MORPH BLD: PRESENT
SODIUM SERPL-SCNC: 140 MMOL/L (ref 135–147)
WBC # BLD AUTO: 1.93 THOUSAND/UL (ref 4.31–10.16)

## 2025-01-21 PROCEDURE — 99213 OFFICE O/P EST LOW 20 MIN: CPT | Performed by: PHYSICIAN ASSISTANT

## 2025-01-21 PROCEDURE — 85027 COMPLETE CBC AUTOMATED: CPT

## 2025-01-21 PROCEDURE — 83735 ASSAY OF MAGNESIUM: CPT

## 2025-01-21 PROCEDURE — 80053 COMPREHEN METABOLIC PANEL: CPT

## 2025-01-21 PROCEDURE — 85007 BL SMEAR W/DIFF WBC COUNT: CPT

## 2025-01-21 NOTE — PROGRESS NOTES
Nallely Peacock  tolerated treatment well with no complications.      Nallely Peacock is aware of future appt on 01/27/2025 at 01:00 PM.     AVS printed and given to Nallely Peacock:  No (Declined by Nallely Peacock)

## 2025-01-21 NOTE — PLAN OF CARE
Problem: Potential for Falls  Goal: Patient will remain free of falls  Description: INTERVENTIONS:  - Educate patient/family on patient safety including physical limitations  - Instruct patient to call for assistance with activity   - Consult OT/PT to assist with strengthening/mobility   - Keep Call bell within reach  - Keep bed low and locked with side rails adjusted as appropriate  - Keep care items and personal belongings within reach  - Initiate and maintain comfort rounds  - Make Fall Risk Sign visible to staff  - Offer Toileting every x Hours, in advance of need  - Initiate/Maintain xalarm  - Obtain necessary fall risk management equipment: x  - Apply yellow socks and bracelet for high fall risk patients  - Consider moving patient to room near nurses station  Outcome: Progressing     Problem: Knowledge Deficit  Goal: Patient/family/caregiver demonstrates understanding of disease process, treatment plan, medications, and discharge instructions  Description: Complete learning assessment and assess knowledge base.  Interventions:  - Provide teaching at level of understanding  - Provide teaching via preferred learning methods  Outcome: Progressing      No
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
## 2025-01-21 NOTE — PATIENT INSTRUCTIONS
Continue methimazole 5 mg daily.     Get lab work in about 6 weeks.     Call with any concerns or questions.     Follow up in 3 months.

## 2025-01-27 ENCOUNTER — HOSPITAL ENCOUNTER (OUTPATIENT)
Dept: INFUSION CENTER | Facility: HOSPITAL | Age: 75
Discharge: HOME | End: 2025-01-27
Payer: MEDICARE

## 2025-01-27 DIAGNOSIS — C54.1 ENDOMETRIAL CANCER (HCC): ICD-10-CM

## 2025-01-27 DIAGNOSIS — Z45.2 ENCOUNTER FOR CENTRAL LINE CARE: Primary | ICD-10-CM

## 2025-01-27 LAB
ALBUMIN SERPL BCG-MCNC: 3.8 G/DL (ref 3.5–5)
ALP SERPL-CCNC: 151 U/L (ref 34–104)
ALT SERPL W P-5'-P-CCNC: 10 U/L (ref 7–52)
ANION GAP SERPL CALCULATED.3IONS-SCNC: 10 MMOL/L (ref 4–13)
AST SERPL W P-5'-P-CCNC: 14 U/L (ref 13–39)
BASOPHILS # BLD AUTO: 0.03 THOUSANDS/ΜL (ref 0–0.1)
BASOPHILS NFR BLD AUTO: 1 % (ref 0–1)
BILIRUB SERPL-MCNC: 0.33 MG/DL (ref 0.2–1)
BUN SERPL-MCNC: 13 MG/DL (ref 5–25)
CALCIUM SERPL-MCNC: 9.2 MG/DL (ref 8.4–10.2)
CHLORIDE SERPL-SCNC: 105 MMOL/L (ref 96–108)
CO2 SERPL-SCNC: 25 MMOL/L (ref 21–32)
CREAT SERPL-MCNC: 0.82 MG/DL (ref 0.6–1.3)
EOSINOPHIL # BLD AUTO: 0.01 THOUSAND/ΜL (ref 0–0.61)
EOSINOPHIL NFR BLD AUTO: 0 % (ref 0–6)
ERYTHROCYTE [DISTWIDTH] IN BLOOD BY AUTOMATED COUNT: 15.3 % (ref 11.6–15.1)
GFR SERPL CREATININE-BSD FRML MDRD: 69 ML/MIN/1.73SQ M
GLUCOSE SERPL-MCNC: 134 MG/DL (ref 65–140)
HCT VFR BLD AUTO: 30.6 % (ref 34.8–46.1)
HGB BLD-MCNC: 9.9 G/DL (ref 11.5–15.4)
IMM GRANULOCYTES # BLD AUTO: 0.15 THOUSAND/UL (ref 0–0.2)
IMM GRANULOCYTES NFR BLD AUTO: 2 % (ref 0–2)
LYMPHOCYTES # BLD AUTO: 0.59 THOUSANDS/ΜL (ref 0.6–4.47)
LYMPHOCYTES NFR BLD AUTO: 9 % (ref 14–44)
MCH RBC QN AUTO: 31.5 PG (ref 26.8–34.3)
MCHC RBC AUTO-ENTMCNC: 32.4 G/DL (ref 31.4–37.4)
MCV RBC AUTO: 98 FL (ref 82–98)
MONOCYTES # BLD AUTO: 0.46 THOUSAND/ΜL (ref 0.17–1.22)
MONOCYTES NFR BLD AUTO: 7 % (ref 4–12)
NEUTROPHILS # BLD AUTO: 5.34 THOUSANDS/ΜL (ref 1.85–7.62)
NEUTS SEG NFR BLD AUTO: 81 % (ref 43–75)
NRBC BLD AUTO-RTO: 0 /100 WBCS
PLATELET # BLD AUTO: 140 THOUSANDS/UL (ref 149–390)
PMV BLD AUTO: 9.2 FL (ref 8.9–12.7)
POTASSIUM SERPL-SCNC: 3.8 MMOL/L (ref 3.5–5.3)
PROT SERPL-MCNC: 6.6 G/DL (ref 6.4–8.4)
RBC # BLD AUTO: 3.14 MILLION/UL (ref 3.81–5.12)
SODIUM SERPL-SCNC: 140 MMOL/L (ref 135–147)
WBC # BLD AUTO: 6.58 THOUSAND/UL (ref 4.31–10.16)

## 2025-01-27 PROCEDURE — 85025 COMPLETE CBC W/AUTO DIFF WBC: CPT

## 2025-01-27 PROCEDURE — 80053 COMPREHEN METABOLIC PANEL: CPT

## 2025-01-31 ENCOUNTER — HOSPITAL ENCOUNTER (OUTPATIENT)
Dept: CT IMAGING | Facility: HOSPITAL | Age: 75
Discharge: HOME | End: 2025-01-31
Payer: MEDICARE

## 2025-01-31 ENCOUNTER — TELEPHONE (OUTPATIENT)
Age: 75
End: 2025-01-31

## 2025-01-31 DIAGNOSIS — C54.1 ENDOMETRIAL CANCER (HCC): ICD-10-CM

## 2025-01-31 PROCEDURE — 71260 CT THORAX DX C+: CPT

## 2025-01-31 PROCEDURE — 74177 CT ABD & PELVIS W/CONTRAST: CPT

## 2025-01-31 RX ADMIN — IOHEXOL 75 ML: 350 INJECTION, SOLUTION INTRAVENOUS at 10:44

## 2025-01-31 NOTE — TELEPHONE ENCOUNTER
Pt calling in requesting to speak to nurse regarding diarrhea. Pt woke up with diarrhea and thinks it's because she drank the bottle of barium last evening for her CT scan today. Pt would like to know if she should drink the other half of the bottle or not.

## 2025-01-31 NOTE — TELEPHONE ENCOUNTER
Return call placed to patient. No answer, LMOM. Patient's scan is for 10:30 so she may be on her way there. Advised that if she didn't complete the last half of the 2nd bottle d/t diarrhea, this is okay.

## 2025-02-03 ENCOUNTER — HOSPITAL ENCOUNTER (OUTPATIENT)
Dept: INFUSION CENTER | Facility: HOSPITAL | Age: 75
Discharge: HOME | End: 2025-02-03
Payer: MEDICARE

## 2025-02-03 DIAGNOSIS — Z45.2 ENCOUNTER FOR CENTRAL LINE CARE: Primary | ICD-10-CM

## 2025-02-03 DIAGNOSIS — C54.1 ENDOMETRIAL CANCER (HCC): ICD-10-CM

## 2025-02-03 DIAGNOSIS — R68.89 OTHER GENERAL SYMPTOMS AND SIGNS: ICD-10-CM

## 2025-02-03 LAB
ALBUMIN SERPL BCG-MCNC: 4.1 G/DL (ref 3.5–5)
ALP SERPL-CCNC: 147 U/L (ref 34–104)
ALT SERPL W P-5'-P-CCNC: 9 U/L (ref 7–52)
AMYLASE SERPL-CCNC: 58 IU/L (ref 29–103)
ANION GAP SERPL CALCULATED.3IONS-SCNC: 9 MMOL/L (ref 4–13)
AST SERPL W P-5'-P-CCNC: 13 U/L (ref 13–39)
BASOPHILS # BLD AUTO: 0.04 THOUSANDS/ΜL (ref 0–0.1)
BASOPHILS NFR BLD AUTO: 1 % (ref 0–1)
BILIRUB SERPL-MCNC: 0.4 MG/DL (ref 0.2–1)
BUN SERPL-MCNC: 15 MG/DL (ref 5–25)
CALCIUM SERPL-MCNC: 9 MG/DL (ref 8.4–10.2)
CANCER AG125 SERPL-ACNC: 6.8 U/ML (ref 0–35)
CHLORIDE SERPL-SCNC: 104 MMOL/L (ref 96–108)
CO2 SERPL-SCNC: 25 MMOL/L (ref 21–32)
CREAT SERPL-MCNC: 0.9 MG/DL (ref 0.6–1.3)
EOSINOPHIL # BLD AUTO: 0.03 THOUSAND/ΜL (ref 0–0.61)
EOSINOPHIL NFR BLD AUTO: 0 % (ref 0–6)
ERYTHROCYTE [DISTWIDTH] IN BLOOD BY AUTOMATED COUNT: 16.7 % (ref 11.6–15.1)
GFR SERPL CREATININE-BSD FRML MDRD: 61 ML/MIN/1.73SQ M
GLUCOSE SERPL-MCNC: 96 MG/DL (ref 65–140)
HCT VFR BLD AUTO: 32.8 % (ref 34.8–46.1)
HGB BLD-MCNC: 10.6 G/DL (ref 11.5–15.4)
IMM GRANULOCYTES # BLD AUTO: 0.09 THOUSAND/UL (ref 0–0.2)
IMM GRANULOCYTES NFR BLD AUTO: 1 % (ref 0–2)
LIPASE SERPL-CCNC: 54 U/L (ref 11–82)
LYMPHOCYTES # BLD AUTO: 0.96 THOUSANDS/ΜL (ref 0.6–4.47)
LYMPHOCYTES NFR BLD AUTO: 12 % (ref 14–44)
MAGNESIUM SERPL-MCNC: 2 MG/DL (ref 1.9–2.7)
MCH RBC QN AUTO: 32.1 PG (ref 26.8–34.3)
MCHC RBC AUTO-ENTMCNC: 32.3 G/DL (ref 31.4–37.4)
MCV RBC AUTO: 99 FL (ref 82–98)
MONOCYTES # BLD AUTO: 0.67 THOUSAND/ΜL (ref 0.17–1.22)
MONOCYTES NFR BLD AUTO: 9 % (ref 4–12)
NEUTROPHILS # BLD AUTO: 6.05 THOUSANDS/ΜL (ref 1.85–7.62)
NEUTS SEG NFR BLD AUTO: 77 % (ref 43–75)
NRBC BLD AUTO-RTO: 0 /100 WBCS
PLATELET # BLD AUTO: 237 THOUSANDS/UL (ref 149–390)
PMV BLD AUTO: 8.6 FL (ref 8.9–12.7)
POTASSIUM SERPL-SCNC: 3.9 MMOL/L (ref 3.5–5.3)
PROT SERPL-MCNC: 6.9 G/DL (ref 6.4–8.4)
RBC # BLD AUTO: 3.3 MILLION/UL (ref 3.81–5.12)
SODIUM SERPL-SCNC: 138 MMOL/L (ref 135–147)
TSH SERPL DL<=0.05 MIU/L-ACNC: 0.72 UIU/ML (ref 0.45–4.5)
WBC # BLD AUTO: 7.84 THOUSAND/UL (ref 4.31–10.16)

## 2025-02-03 PROCEDURE — 85025 COMPLETE CBC W/AUTO DIFF WBC: CPT

## 2025-02-03 PROCEDURE — 80053 COMPREHEN METABOLIC PANEL: CPT

## 2025-02-03 PROCEDURE — 86304 IMMUNOASSAY TUMOR CA 125: CPT

## 2025-02-03 PROCEDURE — 82150 ASSAY OF AMYLASE: CPT

## 2025-02-03 PROCEDURE — 83735 ASSAY OF MAGNESIUM: CPT

## 2025-02-03 PROCEDURE — 84443 ASSAY THYROID STIM HORMONE: CPT

## 2025-02-03 PROCEDURE — 83690 ASSAY OF LIPASE: CPT

## 2025-02-03 NOTE — PROGRESS NOTES
Nallely Peacock  tolerated treatment well with no complications.      Nallely Peacock is aware of future appt on 2/7 at 9am.     AVS printed and given to Nallely Peacock:  Yes

## 2025-02-04 ENCOUNTER — TELEPHONE (OUTPATIENT)
Age: 75
End: 2025-02-04

## 2025-02-04 ENCOUNTER — RESULTS FOLLOW-UP (OUTPATIENT)
Dept: GYNECOLOGIC ONCOLOGY | Facility: CLINIC | Age: 75
End: 2025-02-04

## 2025-02-04 ENCOUNTER — TELEPHONE (OUTPATIENT)
Dept: GYNECOLOGIC ONCOLOGY | Facility: CLINIC | Age: 75
End: 2025-02-04

## 2025-02-04 DIAGNOSIS — I82.412 ACUTE DEEP VEIN THROMBOSIS (DVT) OF FEMORAL VEIN OF LEFT LOWER EXTREMITY (HCC): Primary | ICD-10-CM

## 2025-02-04 DIAGNOSIS — I82.412 ACUTE DEEP VEIN THROMBOSIS (DVT) OF FEMORAL VEIN OF LEFT LOWER EXTREMITY (HCC): ICD-10-CM

## 2025-02-04 NOTE — TELEPHONE ENCOUNTER
Reason for call:   [x] Prior Auth  [] Other: Pharmacy comment: Alternative Requested:MAX DAILY DOSE OF 2 PER INSURANCE.    Medication: Take 2 tablets PO BID x 7 days, then 1 tab PO BID thereafter     Dose/Frequency: Take 2 tablets PO BID x 7 days, then 1 tab PO BID thereafter     Quantity: 70 tablet     Ordering Provider:   [] Speciality/Provider - Rosario Mares PA-C

## 2025-02-04 NOTE — TELEPHONE ENCOUNTER
Called and helped schedule patients study. Patient was informed of the date,time and location of this appointment. Patient was in agreement of this.

## 2025-02-04 NOTE — TELEPHONE ENCOUNTER
Called and spoke with Patient about next appointment.  Per Rosario it can be virtual.  Please call [phone] for appt.

## 2025-02-04 NOTE — TELEPHONE ENCOUNTER
----- Message from Rosario Mares PA-C sent at 2/4/2025  3:15 PM EST -----  Regarding: STAT venous doppler  Can you please schedule STAT venous doppler LLE?  Preference is [facility] and Wednesday.   Thanks!

## 2025-02-05 ENCOUNTER — RESULTS FOLLOW-UP (OUTPATIENT)
Dept: GYNECOLOGIC ONCOLOGY | Facility: CLINIC | Age: 75
End: 2025-02-05

## 2025-02-05 ENCOUNTER — DOCUMENTATION (OUTPATIENT)
Age: 75
End: 2025-02-05

## 2025-02-05 ENCOUNTER — TELEPHONE (OUTPATIENT)
Age: 75
End: 2025-02-05

## 2025-02-05 ENCOUNTER — DOCUMENTATION (OUTPATIENT)
Dept: HEMATOLOGY ONCOLOGY | Facility: CLINIC | Age: 75
End: 2025-02-05

## 2025-02-05 ENCOUNTER — HOSPITAL ENCOUNTER (OUTPATIENT)
Dept: NON INVASIVE DIAGNOSTICS | Age: 75
Discharge: HOME | End: 2025-02-05
Payer: MEDICARE

## 2025-02-05 DIAGNOSIS — I82.412 ACUTE DEEP VEIN THROMBOSIS (DVT) OF FEMORAL VEIN OF LEFT LOWER EXTREMITY (HCC): ICD-10-CM

## 2025-02-05 PROCEDURE — 93970 EXTREMITY STUDY: CPT | Performed by: SURGERY

## 2025-02-05 PROCEDURE — 93970 EXTREMITY STUDY: CPT

## 2025-02-05 NOTE — PROGRESS NOTES
Received a message from patient stating her co pay for Eliquis was over $600 so I called her retail pharmacy and they stated this was going through her insurance and covered she just has a high co pay due to the beginning on the year. FA team and clinical aware.

## 2025-02-05 NOTE — TELEPHONE ENCOUNTER
Patient called back to check on the status of the assistance with her mediation, she stated that she is going to stop at [facility] and speak to someone as well.

## 2025-02-05 NOTE — PROGRESS NOTES
Patient enrolled in [employer] Free 30 Day Trial Offer   BIN:[id number]  PCN:[id number]  GRP:[id number]  ID: [id number]    Placed call to [facility] and provided this information, pharmacy was able to get a paid claim resulting in a $0 co-payment for 1 fill only.

## 2025-02-05 NOTE — TELEPHONE ENCOUNTER
Stephenie from [facility] and vascular [place] calling to report a significant finding on a venous duplex for the patient.  She reports an acute/subacute DVT in the left common femoral vein.  I informed her that I well send message to her care team for follow up.  She thanked me.

## 2025-02-05 NOTE — PROGRESS NOTES
An introductory outreach call was made to the patient, who answered. This writer introduced herself and explained the scope of the Oncology  role. This writer provided contact information, reviewed insurance details, discussed the upcoming treatment date and time, and answered all questions. [facility] PT instructed to call [employer] to enroll. PT wants to  medication on Monday and wants to confirm with clinical that based on US results, is it safe to wait. Inquired with HCP. PT grateful for the call.    Bridgette Deleon, MPH  Phone: [phone]  Email: [email or web address]

## 2025-02-05 NOTE — TELEPHONE ENCOUNTER
Return notified patient of confirmed thrombus on u/s. Additionally, copay card applied and 30-day free copay card applied.

## 2025-02-05 NOTE — TELEPHONE ENCOUNTER
Call received from patient. She is questioning if there is any other medication she can take other than eliquis. Patient stated she would be paying $500 a month for this and is very concerned. She also is inquiring about the results of her US she had completed this morning.

## 2025-02-06 ENCOUNTER — TELEMEDICINE (OUTPATIENT)
Age: 75
End: 2025-02-06
Payer: MEDICARE

## 2025-02-06 ENCOUNTER — TELEPHONE (OUTPATIENT)
Age: 75
End: 2025-02-06

## 2025-02-06 DIAGNOSIS — D70.2 DRUG-INDUCED NEUTROPENIA (HCC): ICD-10-CM

## 2025-02-06 DIAGNOSIS — C54.1 ENDOMETRIAL CANCER (HCC): Primary | ICD-10-CM

## 2025-02-06 DIAGNOSIS — I82.412 ACUTE DEEP VEIN THROMBOSIS (DVT) OF FEMORAL VEIN OF LEFT LOWER EXTREMITY (HCC): ICD-10-CM

## 2025-02-06 DIAGNOSIS — R68.89 OTHER GENERAL SYMPTOMS AND SIGNS: ICD-10-CM

## 2025-02-06 PROBLEM — Z90.710 STATUS POST HYSTERECTOMY: Status: RESOLVED | Noted: 2024-10-31 | Resolved: 2025-02-06

## 2025-02-06 PROCEDURE — G2211 COMPLEX E/M VISIT ADD ON: HCPCS | Performed by: OBSTETRICS & GYNECOLOGY

## 2025-02-06 PROCEDURE — 99214 OFFICE O/P EST MOD 30 MIN: CPT | Performed by: OBSTETRICS & GYNECOLOGY

## 2025-02-06 RX ORDER — SODIUM CHLORIDE 9 MG/ML
20 INJECTION, SOLUTION INTRAVENOUS ONCE
Status: CANCELLED | OUTPATIENT
Start: 2025-02-07

## 2025-02-06 NOTE — TELEPHONE ENCOUNTER
Spoke with pt regarding follow up to virtual appt today with provider.  Scheduled the CT Scan with her for 5/8/25 @ 10 AM.  Pt needs to arrive at 9 AM to drink the clear barium.  Made sure she was aware of her next appt with provider on 5/22/25 @ 2:45 PM.

## 2025-02-06 NOTE — PROGRESS NOTES
Virtual Brief Visit  Name: Nallely Peacock      : 1950      MRN: [id number]  Encounter Provider: Lavon Jacome MD  Encounter Date: 2025   Encounter department: [facility]    This Visit is being completed by telephone. The Patient is located at Home and in the following state in which I hold an active license [place]    The patient was identified by name and date of birth. Nallely Peacock was informed that this is a telemedicine visit and that the visit is being conducted through Telephone.  My office door was closed. No one else was in the room.  She acknowledged consent and understanding of privacy and security of the video platform. The patient has agreed to participate and understands they can discontinue the visit at any time.    Patient is aware this is a billable service.     :  Assessment & Plan  Endometrial cancer (HCC)  75-year-old with stage III C2 high-grade endometrial cancer, favor germ cell type who completed neoadjuvant chemotherapy, surgery with suboptimal debulking, adjuvant chemotherapy for a total of 6 cycles of platinum, taxane, dostarlimab treatment.  She is now scheduled for dostarlimab maintenance therapy at 1000 mg IV every 42 days.  I reviewed CBC, CMP, amylase, lipase, TSH, magnesium, , CT chest abdomen pelvis images, lower extremity Doppler.  Her performance status is 0.  1.  Given CT findings of possible enlarging lymphadenopathy which based on imaging review is likely treatment response, we will plan for MRD testing.  2.We further discussed the use of ctDNA and that it can be measured to assess the absence or presence of molecular residual disease. The sensitivity of this assay in gyn cancers has not been readily established but is utilized in other disease sites to monitor progression and treatment response. As a result, it may or may not give us clinical information that is useful at this time but may  provide added insight for the future.  She is interested in MRD testing.  3.  Repeat CT chest abdomen pelvis in 3 months  4.  Continue with planned dostarlimab maintenance therapy.  Can reevaluate based on MRD results.    Orders:    CT chest abdomen pelvis w contrast; Future    Drug-induced neutropenia (HCC)  Resolved after completion of chemotherapy with G-CSF support.  Current total white blood cell count is 7.8.       Acute deep vein thrombosis (DVT) of femoral vein of left lower extremity (HCC)  Asymptomatic and based on CT and lower extremity Doppler findings.  Continue therapeutic Eliquis.             History of Present Illness   Who presents for a virtual visit prior to starting dostarlimab maintenance therapy.  She completed 6 cycles of carboplatin, Taxol, dostarlimab with G-CSF support 1/15/2025.  Labs from 2/3/2025 revealed a  6.8 units/mL, normal TSH, magnesium, amylase, lipase, CMP.  CBC revealed mild anemia with a hemoglobin of 10.6 g/dL with otherwise normal parameters.  She had a CT scan of the chest abdomen and pelvis on 1/31/2025.  I personally reviewed the images.  I concur with the radiologist's opinion.  There are findings consistent with residual lymphadenopathy, however, it is mainly hypodense/cystic which is more indicative of possible treatment effect.  There is a new cystic area measuring 1.5 cm in the left hepatic lobe.  She had a new left femoral DVT which was confirmed by lower extremity Doppler on 2/5/2025.  She is currently on therapeutic anticoagulation with Eliquis.  She feels great.  She is able to perform her actives of daily living without difficulty.  She only ambulates with a walker when the ground is unsteady.  She does not have any abdominal pain, pelvic pain, or vaginal bleeding.  No other interval change in medications or medical history since her last visit to the office.      Oncology History   Endometrial cancer (HCC)   7/18/2024 Initial Diagnosis    Endometrial  cancer (HCC)     8/13/2024 -  Chemotherapy    Taxol 135 mg/m2, carboplatin AUC 4, dostarlimab 500 mg IV every 21 days.     Carboplatin dose-reduced with cycle 3 from AUC 5 to AUC 4 d/t neutropenia.       8/29/2024 -  Cancer Staged    clinical stage IIIC2 disease (retroperitoneal and aortocaval lymphadenopathy) vs IVB (possible bowel mesentery disease) based on imaging  Staging form: Corpus Uteri - Carcinoma, AJCC 8th Edition  - Clinical: FIGO Stage IIIC2 - Signed by Mansoor Alonso MD on 8/29/2024  Histopathologic type: Endometrioid adenocarcinoma, NOS  Stage prefix: Initial diagnosis  Histologic grade (G): G3  Histologic grading system: 3 grade system  Specimen type: Biopsy / Limited Resection       10/29/2024 Surgery    ROBOTIC TOTAL LAPAROSCOPIC HYSTERECTOMY. BILATERAL SALPINGO-OOPHERECTOMY  Bilateral - BILATERAL PARAAORTIC LYMPH NODE DISSECTION. DEBULKING  -Suboptimal debulking of periaortic lymph nodes.  Final pathology consistent with metastatic disease with extensive therapy effects, high-grade malignancy.   CancerTYPE ID testing reportedly reports a 90% probability of germ cell origin. Additional immunohistochemical stains show subsets of the tumor staining for SALL4, LIN28 and SOX2, which would also support germ cell origin. While the overall diagnosis of undifferentiated / poorly differentiated malignancy remains unchanged, these findings together favor (or at least raise the possibility of) this being a poorly differentiated germ cell tumor.        10/29/2024 Genomic Testing      Had a genetics consult. Determined no need for germline testing.        VAS VENOUS DUPLEX - LOWER LIMB BILATERAL     [facility] REPORT  CLINICAL:  Indications: Acute embolism and thrombosis of left femoral vein [I82.412].  Patient had a recent CT scan that indicated there may be thrombus within her  left common femoral vein.  Patient has no pain or swelling of her legs.  She is  currently receiving chemotherapy  treatment.  Operative History:  no reported cardiovascular surgeries     FINDINGS:     Left     Impression             CFV      Occlusive Segmental             CONCLUSION:  Impression:  RIGHT LOWER LIMB:  No evidence of acute or chronic deep vein thrombosis.  No evidence of superficial thrombophlebitis noted.  Doppler evaluation shows a normal response to augmentation maneuvers.  Popliteal, posterior tibial and anterior tibial arterial Doppler waveforms are  triphasic.     LEFT LOWER LIMB:  There is evidence of acute vs subacute non-occlusive thrombus within the common  femoral vein.  No evidence of superficial thrombophlebitis noted.  Doppler evaluation shows a normal response to augmentation maneuvers.  Popliteal, posterior tibial and anterior tibial arterial Doppler waveforms are  triphasic.     Technical findings were given to Jalen at [facility]'s office at 1210 on  2/5/2025.     SIGNATURE:  Electronically Signed by: ARTEMIO KING on 2025-02-05 01:55:16 PM      Visit Time  Total Visit Duration: I have spent a total time of 25 minutes in caring for this patient on the day of the visit/encounter including Diagnostic results, Prognosis, Risks and benefits of tx options, Instructions for management, Patient and family education, Importance of tx compliance, Impressions, Counseling / Coordination of care, Documenting in the medical record, Reviewing / ordering tests, medicine, procedures  , and Obtaining or reviewing history  .

## 2025-02-07 ENCOUNTER — HOSPITAL ENCOUNTER (OUTPATIENT)
Dept: INFUSION CENTER | Facility: HOSPITAL | Age: 75
End: 2025-02-07
Attending: OBSTETRICS & GYNECOLOGY
Payer: MEDICARE

## 2025-02-07 VITALS — BODY MASS INDEX: 22.92 KG/M2 | HEIGHT: 64 IN | WEIGHT: 134.26 LBS

## 2025-02-07 DIAGNOSIS — C54.1 ENDOMETRIAL CANCER (HCC): Primary | ICD-10-CM

## 2025-02-07 PROCEDURE — 96413 CHEMO IV INFUSION 1 HR: CPT

## 2025-02-07 PROCEDURE — 96415 CHEMO IV INFUSION ADDL HR: CPT

## 2025-02-07 RX ORDER — SODIUM CHLORIDE 9 MG/ML
20 INJECTION, SOLUTION INTRAVENOUS ONCE
Status: COMPLETED | OUTPATIENT
Start: 2025-02-07 | End: 2025-02-07

## 2025-02-07 RX ADMIN — SODIUM CHLORIDE 20 ML/HR: 0.9 INJECTION, SOLUTION INTRAVENOUS at 09:09

## 2025-02-07 RX ADMIN — SODIUM CHLORIDE 1000 MG: 9 INJECTION, SOLUTION INTRAVENOUS at 09:44

## 2025-02-07 NOTE — PROGRESS NOTES
Nallely Peacock  tolerated treatment well with no complications.      Nallely Peacock is aware of future appt on 3/17/2025 at 1230.     AVS printed and given to Nallely Peacock:  Yes

## 2025-02-10 ENCOUNTER — TELEPHONE (OUTPATIENT)
Age: 75
End: 2025-02-10

## 2025-02-10 DIAGNOSIS — E05.90 HYPERTHYROIDISM: ICD-10-CM

## 2025-02-10 NOTE — TELEPHONE ENCOUNTER
Please let her know she does not need to go for labs until just prior to her next treatment. An updated calendar is being mailed today. Thanks!

## 2025-02-10 NOTE — TELEPHONE ENCOUNTER
Call received from patient. Wanted clarification on her labs and how often she should be getting them drawn. Informed her per the standing lab orders in her chart, she should be getting them drawn every 6 weeks. Her next scheduled appt for labs is march 17th at [facility]. Patient wanted confirmation from Dr Jacome on whether she needs to go today to get labs drawn or not.

## 2025-02-10 NOTE — TELEPHONE ENCOUNTER
Called to inform patient that she does not need to go for labs until her next treatment. Also a new chemo calendar is being mailed out today to her residence. Patient was thankful for my call.

## 2025-02-11 RX ORDER — METHIMAZOLE 5 MG/1
5 TABLET ORAL DAILY
Qty: 90 TABLET | Refills: 1 | OUTPATIENT
Start: 2025-02-11

## 2025-02-12 ENCOUNTER — TELEPHONE (OUTPATIENT)
Age: 75
End: 2025-02-12

## 2025-02-12 NOTE — TELEPHONE ENCOUNTER
Patient calling to request the time for her infusion maintenance appt on 3/17/25 @ 12:30pm be scheduled for an earliertime. She has an another appt scheduled at 1:20pm

## 2025-02-14 ENCOUNTER — TELEPHONE (OUTPATIENT)
Dept: OTHER | Facility: OTHER | Age: 75
End: 2025-02-14

## 2025-02-14 ENCOUNTER — TELEPHONE (OUTPATIENT)
Age: 75
End: 2025-02-14

## 2025-02-14 ENCOUNTER — TELEPHONE (OUTPATIENT)
Dept: OTHER | Facility: HOSPITAL | Age: 75
End: 2025-02-14

## 2025-02-14 NOTE — TELEPHONE ENCOUNTER
"Patient stated, \"I have severe pain on my lower back, I would like to speak to the on call doctor to Dr. Jacome's office, and need something for my pain sent to [facility] pharmacy [address] [place] [address].\"      On call provider notified via secure chat  "

## 2025-02-14 NOTE — QUICK NOTE
75-year-old with stage III C2 high-grade endometrial cancer, favor germ cell type who completed neoadjuvant chemotherapy, surgery with suboptimal debulking, adjuvant chemotherapy for a total of 6 cycles of platinum, taxane, dostarlimab treatment. Currently on dostarlimab maintenance, last dose 2/7.     Patient calling that she thinks she may have pulled a muscle in her lower back. She was picking up a recycling bin and tried tossing the bin as well when she got a sharp lower back pain. She did not fall. States that she took tylenol 1000mg PO without help. Discussed taking ibuprofen 600mg q 8hour. She is also using a heating pad. She is going to monitor her pain.     Discussed return symptoms and evaluation to ED if pain if worsens.     Patient in understanding and willing to try NSAID.       Benjamin Osorio MD

## 2025-02-14 NOTE — TELEPHONE ENCOUNTER
Patient called, stating she had a fall, and would like to know if we can send a prescription to the pharmacy for her pain

## 2025-02-19 ENCOUNTER — TELEPHONE (OUTPATIENT)
Age: 75
End: 2025-02-19

## 2025-02-19 NOTE — TELEPHONE ENCOUNTER
Return anuradha placed and discussed that the letter she received is in reference to the recent CT that was already discussed at her last office visit.  She voiced understanding.

## 2025-02-19 NOTE — TELEPHONE ENCOUNTER
Patient called, stating she got a letter in the mail in regards to a recent CT scan and was advised to call her provider. She would like to get a call back to discuss

## 2025-02-26 ENCOUNTER — DOCUMENTATION (OUTPATIENT)
Dept: HEMATOLOGY ONCOLOGY | Facility: CLINIC | Age: 75
End: 2025-02-26

## 2025-02-26 ENCOUNTER — TELEPHONE (OUTPATIENT)
Age: 75
End: 2025-02-26

## 2025-02-26 DIAGNOSIS — E05.90 HYPERTHYROIDISM: ICD-10-CM

## 2025-02-26 DIAGNOSIS — I82.412 ACUTE DEEP VEIN THROMBOSIS (DVT) OF FEMORAL VEIN OF LEFT LOWER EXTREMITY (HCC): ICD-10-CM

## 2025-02-26 NOTE — TELEPHONE ENCOUNTER
Nallely calling in to see if there is any assistance to help pay for her Eliquis, her insurance is charging her $590. Please advise, thank you.

## 2025-02-26 NOTE — PROGRESS NOTES
An introductory outreach call was made to the patient, who answered. This writer introduced herself and explained the scope of the Oncology  role. This writer provided contact information, reviewed insurance details, discussed the upcoming treatment date and time, and answered all questions.  [facility]    Bridgette Deleon MPH  Phone: [phone]  Email: [email or web address]

## 2025-02-27 RX ORDER — METHIMAZOLE 5 MG/1
5 TABLET ORAL DAILY
Qty: 90 TABLET | Refills: 1 | Status: SHIPPED | OUTPATIENT
Start: 2025-02-27

## 2025-03-10 ENCOUNTER — RA CDI HCC (OUTPATIENT)
Dept: OTHER | Facility: HOSPITAL | Age: 75
End: 2025-03-10

## 2025-03-17 ENCOUNTER — OFFICE VISIT (OUTPATIENT)
Dept: FAMILY MEDICINE CLINIC | Facility: HOSPITAL | Age: 75
End: 2025-03-17
Payer: MEDICARE

## 2025-03-17 ENCOUNTER — HOSPITAL ENCOUNTER (OUTPATIENT)
Dept: INFUSION CENTER | Facility: HOSPITAL | Age: 75
Discharge: HOME | End: 2025-03-17
Payer: MEDICARE

## 2025-03-17 VITALS
HEART RATE: 105 BPM | TEMPERATURE: 98.9 F | DIASTOLIC BLOOD PRESSURE: 69 MMHG | WEIGHT: 136.6 LBS | BODY MASS INDEX: 23.32 KG/M2 | HEIGHT: 64 IN | SYSTOLIC BLOOD PRESSURE: 119 MMHG | OXYGEN SATURATION: 99 %

## 2025-03-17 DIAGNOSIS — I82.412 ACUTE DEEP VEIN THROMBOSIS (DVT) OF FEMORAL VEIN OF LEFT LOWER EXTREMITY (HCC): ICD-10-CM

## 2025-03-17 DIAGNOSIS — C54.1 ENDOMETRIAL CANCER (HCC): Primary | ICD-10-CM

## 2025-03-17 DIAGNOSIS — C54.1 ENDOMETRIAL CANCER (HCC): ICD-10-CM

## 2025-03-17 DIAGNOSIS — I10 ESSENTIAL (PRIMARY) HYPERTENSION: ICD-10-CM

## 2025-03-17 DIAGNOSIS — R68.89 OTHER GENERAL SYMPTOMS AND SIGNS: ICD-10-CM

## 2025-03-17 DIAGNOSIS — E05.90 HYPERTHYROIDISM: ICD-10-CM

## 2025-03-17 DIAGNOSIS — E04.1 THYROID NODULE: ICD-10-CM

## 2025-03-17 DIAGNOSIS — E78.2 MIXED HYPERLIPIDEMIA: ICD-10-CM

## 2025-03-17 DIAGNOSIS — I35.0 MODERATE AORTIC STENOSIS: ICD-10-CM

## 2025-03-17 PROBLEM — R63.4 WEIGHT LOSS: Status: RESOLVED | Noted: 2024-07-18 | Resolved: 2025-03-17

## 2025-03-17 PROBLEM — R00.0 TACHYCARDIA: Status: RESOLVED | Noted: 2024-07-18 | Resolved: 2025-03-17

## 2025-03-17 PROBLEM — K92.2 LOWER GASTROINTESTINAL HEMORRHAGE: Status: RESOLVED | Noted: 2022-04-29 | Resolved: 2025-03-17

## 2025-03-17 LAB
ALBUMIN SERPL BCG-MCNC: 4 G/DL (ref 3.5–5)
ALP SERPL-CCNC: 85 U/L (ref 34–104)
ALT SERPL W P-5'-P-CCNC: 7 U/L (ref 7–52)
AMYLASE SERPL-CCNC: 56 IU/L (ref 29–103)
ANION GAP SERPL CALCULATED.3IONS-SCNC: 8 MMOL/L (ref 4–13)
AST SERPL W P-5'-P-CCNC: 14 U/L (ref 13–39)
BASOPHILS # BLD AUTO: 0.04 THOUSANDS/ÂΜL (ref 0–0.1)
BASOPHILS NFR BLD AUTO: 1 % (ref 0–1)
BILIRUB SERPL-MCNC: 0.57 MG/DL (ref 0.2–1)
BUN SERPL-MCNC: 12 MG/DL (ref 5–25)
CALCIUM SERPL-MCNC: 8.8 MG/DL (ref 8.4–10.2)
CANCER AG125 SERPL-ACNC: 3.7 U/ML (ref 0–35)
CHLORIDE SERPL-SCNC: 106 MMOL/L (ref 96–108)
CO2 SERPL-SCNC: 26 MMOL/L (ref 21–32)
CREAT SERPL-MCNC: 0.91 MG/DL (ref 0.6–1.3)
EOSINOPHIL # BLD AUTO: 0.07 THOUSAND/ÂΜL (ref 0–0.61)
EOSINOPHIL NFR BLD AUTO: 2 % (ref 0–6)
ERYTHROCYTE [DISTWIDTH] IN BLOOD BY AUTOMATED COUNT: 13.5 % (ref 11.6–15.1)
GFR SERPL CREATININE-BSD FRML MDRD: 61 ML/MIN/1.73SQ M
GLUCOSE SERPL-MCNC: 94 MG/DL (ref 65–140)
HCT VFR BLD AUTO: 35.1 % (ref 34.8–46.1)
HGB BLD-MCNC: 11.6 G/DL (ref 11.5–15.4)
IMM GRANULOCYTES # BLD AUTO: 0.03 THOUSAND/UL (ref 0–0.2)
IMM GRANULOCYTES NFR BLD AUTO: 1 % (ref 0–2)
LIPASE SERPL-CCNC: 54 U/L (ref 11–82)
LYMPHOCYTES # BLD AUTO: 0.48 THOUSANDS/ÂΜL (ref 0.6–4.47)
LYMPHOCYTES NFR BLD AUTO: 17 % (ref 14–44)
MAGNESIUM SERPL-MCNC: 2 MG/DL (ref 1.9–2.7)
MCH RBC QN AUTO: 33.1 PG (ref 26.8–34.3)
MCHC RBC AUTO-ENTMCNC: 33 G/DL (ref 31.4–37.4)
MCV RBC AUTO: 100 FL (ref 82–98)
MONOCYTES # BLD AUTO: 0.27 THOUSAND/ÂΜL (ref 0.17–1.22)
MONOCYTES NFR BLD AUTO: 9 % (ref 4–12)
NEUTROPHILS # BLD AUTO: 2 THOUSANDS/ÂΜL (ref 1.85–7.62)
NEUTS SEG NFR BLD AUTO: 70 % (ref 43–75)
NRBC BLD AUTO-RTO: 0 /100 WBCS
PLATELET # BLD AUTO: 31 THOUSANDS/UL (ref 149–390)
PLATELET BLD QL SMEAR: ABNORMAL
PMV BLD AUTO: 9.8 FL (ref 8.9–12.7)
POTASSIUM SERPL-SCNC: 3.7 MMOL/L (ref 3.5–5.3)
PROT SERPL-MCNC: 6.5 G/DL (ref 6.4–8.4)
RBC # BLD AUTO: 3.5 MILLION/UL (ref 3.81–5.12)
RBC MORPH BLD: NORMAL
SODIUM SERPL-SCNC: 140 MMOL/L (ref 135–147)
TSH SERPL DL<=0.05 MIU/L-ACNC: 1.22 UIU/ML (ref 0.45–4.5)
WBC # BLD AUTO: 2.89 THOUSAND/UL (ref 4.31–10.16)

## 2025-03-17 PROCEDURE — 83735 ASSAY OF MAGNESIUM: CPT

## 2025-03-17 PROCEDURE — 85025 COMPLETE CBC W/AUTO DIFF WBC: CPT

## 2025-03-17 PROCEDURE — 84443 ASSAY THYROID STIM HORMONE: CPT

## 2025-03-17 PROCEDURE — 82150 ASSAY OF AMYLASE: CPT

## 2025-03-17 PROCEDURE — 99214 OFFICE O/P EST MOD 30 MIN: CPT | Performed by: INTERNAL MEDICINE

## 2025-03-17 PROCEDURE — 86304 IMMUNOASSAY TUMOR CA 125: CPT

## 2025-03-17 PROCEDURE — 80053 COMPREHEN METABOLIC PANEL: CPT

## 2025-03-17 PROCEDURE — G2211 COMPLEX E/M VISIT ADD ON: HCPCS | Performed by: INTERNAL MEDICINE

## 2025-03-17 PROCEDURE — 83690 ASSAY OF LIPASE: CPT

## 2025-03-17 NOTE — PROGRESS NOTES
aNllely Peacock  tolerated treatment well with no complications.      Nallely Peacock is aware of future appt on 3/21/25 at 1130.     AVS printed and given to Nallely Peacock:    No (Declined by Nallely Peacock)

## 2025-03-17 NOTE — ASSESSMENT & PLAN NOTE
Overdue for Echo - order given, will follow  Orders:    Echo complete w/ contrast if indicated; Future     2 seconds or less

## 2025-03-17 NOTE — ASSESSMENT & PLAN NOTE
She completed neoadjuvant chemo with suboptimal debulking and adjuvant chemo for a total of 6 cycles of tx, she is currently on dostarlimab for maintenance, she has scan's and BW done regularly with GYN Onc and Onc, will follow

## 2025-03-17 NOTE — ASSESSMENT & PLAN NOTE
BP great today, con't current Amlodipine, will follow  Orders:    Comprehensive metabolic panel; Future

## 2025-03-17 NOTE — ASSESSMENT & PLAN NOTE
FLP due in May -  order given, con't current statin for now, will follow  Orders:    Lipid panel; Future

## 2025-03-17 NOTE — PROGRESS NOTES
Name: Nallely Peacock      : 1950      MRN: [id number]  Encounter Provider: Swapna Colmenares DO  Encounter Date: 3/17/2025   Encounter department: [facility] PRIMARY CARE [address]   :  Assessment & Plan  Endometrial cancer (HCC)  She completed neoadjuvant chemo with suboptimal debulking and adjuvant chemo for a total of 6 cycles of tx, she is currently on dostarlimab for maintenance, she has scan's and BW done regularly with GYN Onc and Onc, will follow       Hyperthyroidism  Clinically euthyroid, TSH today is good, con't tx and labs and f/u as per Endo       Acute deep vein thrombosis (DVT) of femoral vein of left lower extremity (HCC)  Recent DVT while ongoing malignancy, on Eliquis for duration of cancer tx, discussed avoidance of NSAIDs but Tylenol is OK, will follow       Essential (primary) hypertension  BP great today, con't current Amlodipine, will follow  Orders:    Comprehensive metabolic panel; Future    Moderate aortic stenosis  Overdue for Echo - order given, will follow  Orders:    Echo complete w/ contrast if indicated; Future    Thyroid nodule  1.5 cm L thyroid nodule noted on CT C/A/P 25 - thyroid US ordered, will follow  Orders:    US thyroid; Future    Mixed hyperlipidemia  FLP due in May -  order given, con't current statin for now, will follow  Orders:    Lipid panel; Future          Depression Screening and Follow-up Plan: Patient was screened for depression during today's encounter. They screened negative with a PHQ-2 score of 0.      Colonoscopy - no longer indicated as she is over 72 yo    Mammo - will discuss at next appt    Dexa - CT C/A/P notable for acute superior endplate fx through L3/4/5 vertebrae c/w osteoporosis - needs a DEXA - will discuss at next appt    BW   FLP     AWV due 25      History of Present Illness   HPI Pt here to transfer care/follow up appt - pt previous pt of Dr. Carlson and has not been seen since 9/10/24    Pt with  dx of stage IIC2 high grade endometrial CA.  She completed neoadjuvant chemo with suboptimal debulking and adjuvant chemo for a total of 6 cycles of tx.  She is currently on dostarlimab for maintenance. She has her  monitored with GYN Onc - last visit with Dr. Jacome was 2/6/25 - OV note reviewed.  Recent CT C/A/P noted possible enlarging adenopathy.  MRD testing was recommended.  Repeat CT C/A/P in 3 mos was recommended. She has BW regularly.  Labs this am showed WBC a bit low and plt dropped significantly to 31. She notes no significant bleeding but does bruise.  She was also recently dx with L femoral vein DVT and is on Eliquis.     Pt follows with Endo (Cameron Renteria) for her hyperthyroidism - OV note from Jan 25 was reviewed.  She is on methimazole daily.  She has repeat TFT's ordered and her TSH was normal today.  She denies tremor/palp/C/D/fatigue/wgt loss/skin changes. She lost her hair with chemo.     BP at goal today and meds were reviewed and no changes have occurred.  She denies missing doses of meds or SE with the meds.  She does not check her BP outside the office.  She notes no frequent Ha's/dizziness/double vision/CP. Pt has dx of moderate AS.  Received message from [employer] that pts Echo is due for her AS.      Review of Systems   Constitutional:  Negative for chills, fatigue, fever and unexpected weight change.   HENT:  Negative for congestion and sore throat.    Eyes:  Negative for pain and visual disturbance.   Respiratory:  Negative for cough, shortness of breath and wheezing.    Cardiovascular:  Positive for leg swelling. Negative for chest pain and palpitations.   Gastrointestinal:  Negative for abdominal pain, blood in stool, constipation, diarrhea, nausea and vomiting.   Genitourinary:  Negative for difficulty urinating and dysuria.   Musculoskeletal:  Negative for back pain and gait problem.   Skin:  Negative for rash and wound.   Neurological:  Negative for dizziness,  "light-headedness and headaches.   Hematological:  Bruises/bleeds easily.   Psychiatric/Behavioral:  Negative for confusion and dysphoric mood.        Objective   /69 (BP Location: Left arm, Patient Position: Sitting, Cuff Size: Standard)   Pulse 105   Temp 98.9 °F (37.2 °C)   Ht 5' 4.02\" (1.626 m)   Wt 62 kg (136 lb 9.6 oz)   SpO2 99%   BMI 23.43 kg/m²      Physical Exam  Vitals and nursing note reviewed.   Constitutional:       General: She is not in acute distress.     Appearance: She is well-developed. She is not ill-appearing.   HENT:      Head: Normocephalic and atraumatic.      Right Ear: External ear normal.      Left Ear: External ear normal.   Eyes:      General:         Right eye: No discharge.         Left eye: No discharge.      Conjunctiva/sclera: Conjunctivae normal.   Neck:      Thyroid: No thyromegaly.      Trachea: No tracheal deviation.   Cardiovascular:      Rate and Rhythm: Normal rate and regular rhythm.      Heart sounds: Murmur heard.   Pulmonary:      Effort: Pulmonary effort is normal. No respiratory distress.      Breath sounds: Normal breath sounds. No wheezing, rhonchi or rales.   Abdominal:      General: There is no distension.      Palpations: Abdomen is soft.      Tenderness: There is no abdominal tenderness. There is no guarding or rebound.   Musculoskeletal:         General: No deformity or signs of injury.      Cervical back: Neck supple.   Lymphadenopathy:      Cervical: No cervical adenopathy.   Skin:     General: Skin is warm and dry.      Coloration: Skin is not pale.      Findings: Bruising present. No rash.   Neurological:      General: No focal deficit present.      Mental Status: She is alert.      Motor: No abnormal muscle tone.      Gait: Gait abnormal.      Comments: Ambulates with roller walker while out of the house   Psychiatric:         Mood and Affect: Mood normal.         Behavior: Behavior normal.         Thought Content: Thought content normal.         " Judgment: Judgment normal.

## 2025-03-17 NOTE — ASSESSMENT & PLAN NOTE
Recent DVT while ongoing malignancy, on Eliquis for duration of cancer tx, discussed avoidance of NSAIDs but Tylenol is OK, will follow

## 2025-03-19 ENCOUNTER — TELEPHONE (OUTPATIENT)
Age: 75
End: 2025-03-19

## 2025-03-19 NOTE — TELEPHONE ENCOUNTER
Pt was seen by PCP on 3/17 for Transfer to [facility], and she forgot to show her that she has a rash behind both of her ankles. Pt is asking for advice on what she can buy OTC to treat it. She stated this happened to her in the past but can't remember what she used. Please call to advise, thank you.

## 2025-03-20 RX ORDER — SODIUM CHLORIDE 9 MG/ML
20 INJECTION, SOLUTION INTRAVENOUS ONCE
Status: CANCELLED | OUTPATIENT
Start: 2025-03-21

## 2025-03-21 ENCOUNTER — HOSPITAL ENCOUNTER (OUTPATIENT)
Dept: INFUSION CENTER | Facility: HOSPITAL | Age: 75
Discharge: HOME | End: 2025-03-21
Attending: OBSTETRICS & GYNECOLOGY
Payer: MEDICARE

## 2025-03-21 VITALS — BODY MASS INDEX: 23.44 KG/M2 | HEIGHT: 64 IN

## 2025-03-21 DIAGNOSIS — C54.1 ENDOMETRIAL CANCER (HCC): Primary | ICD-10-CM

## 2025-03-21 PROCEDURE — 96413 CHEMO IV INFUSION 1 HR: CPT

## 2025-03-21 PROCEDURE — 96415 CHEMO IV INFUSION ADDL HR: CPT

## 2025-03-21 RX ORDER — SODIUM CHLORIDE 9 MG/ML
20 INJECTION, SOLUTION INTRAVENOUS ONCE
Status: COMPLETED | OUTPATIENT
Start: 2025-03-21 | End: 2025-03-21

## 2025-03-21 RX ADMIN — SODIUM CHLORIDE 20 ML/HR: 9 INJECTION, SOLUTION INTRAVENOUS at 12:33

## 2025-03-21 RX ADMIN — SODIUM CHLORIDE 1000 MG: 9 INJECTION, SOLUTION INTRAVENOUS at 12:37

## 2025-03-21 NOTE — PROGRESS NOTES
Pt tolerated chemotherapy infusion without any adverse reactions. Port flushed and de-accessed. Pt ambulated out of unit with a steady gait and the use of her walker. AVS printed and given to pt.     Pt will f/u with pcp regarding her swollen legs. Rosario Mares aware    Aware of next appt 04/28/25 @ 1200 noon

## 2025-03-24 ENCOUNTER — OFFICE VISIT (OUTPATIENT)
Dept: FAMILY MEDICINE CLINIC | Facility: HOSPITAL | Age: 75
End: 2025-03-24
Payer: MEDICARE

## 2025-03-24 VITALS
HEIGHT: 64 IN | WEIGHT: 138.2 LBS | SYSTOLIC BLOOD PRESSURE: 122 MMHG | BODY MASS INDEX: 23.6 KG/M2 | OXYGEN SATURATION: 99 % | TEMPERATURE: 98.4 F | HEART RATE: 80 BPM | DIASTOLIC BLOOD PRESSURE: 80 MMHG

## 2025-03-24 DIAGNOSIS — R21 RASH: Primary | ICD-10-CM

## 2025-03-24 DIAGNOSIS — R60.0 EDEMA OF BOTH LEGS: ICD-10-CM

## 2025-03-24 PROCEDURE — G2211 COMPLEX E/M VISIT ADD ON: HCPCS | Performed by: INTERNAL MEDICINE

## 2025-03-24 PROCEDURE — 99214 OFFICE O/P EST MOD 30 MIN: CPT | Performed by: INTERNAL MEDICINE

## 2025-03-24 NOTE — PROGRESS NOTES
Name: Nallely Peacock      : 1950      MRN: [id number]  Encounter Provider: Swapna Colmenares DO  Encounter Date: 3/24/2025   Encounter department: [facility] CARE [address]   :  Assessment & Plan  Rash  D/w pt that likely contact dermatitis - likely d/t foot rest she is resting her legs on while sitting, advised to cover foot rest or use pillow with cotton pillow case over it, may use Cortisone-10 bid for 10 days as well, urged elevate LE and watch sodium in diet as may certainly have a venous stasis component as well, call with any s/sx of infection - reviewed with pt in detail       Edema of both legs  Urged low sodium diet/elevate legs while at rest, may benefit from compression stockings as well, already on Eliquis bid d/t h/o DVT LLE            Colonoscopy - no longer indicated as she is over 72 yo     Mammo - pt deferring at this time    Dexa - CT C/A/P notable for acute superior endplate fx through L3/4/5 vertebrae c/w osteoporosis - needs a DEXA - pt deferring for now    Echo - scheduled    Thyroid US - ordered and again urged pt to schedule     BW   FLP      AWV due 25      History of Present Illness   HPI Pt here for an acute visit    Pt noting a rash at posterior ankles B/L. She is not sure how long the rash has been present.  She notes she elevates her legs and is not sure if the material of the foot rest is irritating the skin.  She notes the rash itches intermittently and does not hurt or burn.  She notes no F/C/drainage from the rash but the area does feel a bit warm.  She tried cortisone 10 about 2 wks ago and it did help.  She has chronic LE edema and thinks it may be a bit worse.  She admits her diet was higher in sodium recently - eating more crackers recently.  She denies OTC NSAID use regularly. She only uses Tylenol prn.         Review of Systems   Constitutional:  Negative for chills.   Respiratory:  Negative for cough and shortness of breath.   "  Cardiovascular:  Positive for leg swelling. Negative for chest pain.   Gastrointestinal:  Negative for diarrhea, nausea and vomiting.   Skin:  Positive for color change and rash.   Neurological:  Negative for dizziness and headaches.       Objective   /80 (BP Location: Left arm, Patient Position: Sitting, Cuff Size: Standard)   Pulse 80   Temp 98.4 °F (36.9 °C)   Ht 5' 4.02\" (1.626 m)   Wt 62.7 kg (138 lb 3.2 oz)   SpO2 99%   BMI 23.71 kg/m²      Physical Exam  Vitals and nursing note reviewed.   Constitutional:       General: She is not in acute distress.     Appearance: She is not ill-appearing.   HENT:      Head: Normocephalic and atraumatic.   Eyes:      General:         Right eye: No discharge.         Left eye: No discharge.      Conjunctiva/sclera: Conjunctivae normal.   Pulmonary:      Effort: Pulmonary effort is normal. No respiratory distress.   Skin:     Coloration: Skin is not pale.      Findings: Rash present.      Comments: B/L posterior distal LE with ill defined irregular erythematous pin point scattered flat macular rash   Neurological:      Mental Status: Mental status is at baseline.      Gait: Gait abnormal.      Comments: Uses roller walker for ambulation   Psychiatric:         Behavior: Behavior normal.         Thought Content: Thought content normal.         Judgment: Judgment normal.         "

## 2025-04-01 ENCOUNTER — RESULTS FOLLOW-UP (OUTPATIENT)
Dept: FAMILY MEDICINE CLINIC | Facility: HOSPITAL | Age: 75
End: 2025-04-01

## 2025-04-01 ENCOUNTER — HOSPITAL ENCOUNTER (OUTPATIENT)
Dept: NON INVASIVE DIAGNOSTICS | Facility: HOSPITAL | Age: 75
Discharge: HOME | End: 2025-04-01
Payer: MEDICARE

## 2025-04-01 VITALS
HEIGHT: 64 IN | HEART RATE: 82 BPM | WEIGHT: 138 LBS | DIASTOLIC BLOOD PRESSURE: 70 MMHG | BODY MASS INDEX: 23.56 KG/M2 | SYSTOLIC BLOOD PRESSURE: 130 MMHG

## 2025-04-01 DIAGNOSIS — I35.0 MODERATE AORTIC STENOSIS: Primary | ICD-10-CM

## 2025-04-01 DIAGNOSIS — I35.0 MODERATE AORTIC STENOSIS: ICD-10-CM

## 2025-04-01 LAB
AORTIC ROOT: 2.7 CM
AORTIC VALVE MEAN VELOCITY: 22.9 M/S
ASCENDING AORTA: 4 CM
AV AREA BY CONTINUOUS VTI: 0.8 CM2
AV AREA PEAK VELOCITY: 0.9 CM2
AV LVOT MEAN GRADIENT: 2 MMHG
AV LVOT PEAK GRADIENT: 3 MMHG
AV MEAN PRESS GRAD SYS DOP V1V2: 23 MMHG
AV ORIFICE AREA US: 0.84 CM2
AV PEAK GRADIENT: 37 MMHG
AV VELOCITY RATIO: 0.27
AV VMAX SYS DOP: 3.04 M/S
BSA FOR ECHO PROCEDURE: 1.67 M2
DOP CALC AO VTI: 70.94 CM
DOP CALC LVOT AREA: 3.14 CM2
DOP CALC LVOT CARDIAC INDEX: 2.47 L/MIN/M2
DOP CALC LVOT CARDIAC OUTPUT: 4.12 L/MIN
DOP CALC LVOT DIAMETER: 2 CM
DOP CALC LVOT PEAK VEL VTI: 18.88 CM
DOP CALC LVOT PEAK VEL: 0.86 M/S
DOP CALC LVOT STROKE INDEX: 35.9 ML/M2
DOP CALC LVOT STROKE VOLUME: 59.28 CM3
E WAVE DECELERATION TIME: 278 MS
E/A RATIO: 0.65
FRACTIONAL SHORTENING: 29 (ref 28–44)
INTERVENTRICULAR SEPTUM IN DIASTOLE (PARASTERNAL SHORT AXIS VIEW): 1.2 CM
INTERVENTRICULAR SEPTUM: 1.2 CM (ref 0.6–1.1)
LAAS-AP2: 19.7 CM2
LAAS-AP4: 12.5 CM2
LEFT ATRIUM SIZE: 3.5 CM
LEFT ATRIUM VOLUME (MOD BIPLANE): 46 ML
LEFT ATRIUM VOLUME INDEX (MOD BIPLANE): 27.5 ML/M2
LEFT INTERNAL DIMENSION IN SYSTOLE: 2.5 CM (ref 2.1–4)
LEFT VENTRICULAR INTERNAL DIMENSION IN DIASTOLE: 3.5 CM (ref 3.5–6)
LEFT VENTRICULAR POSTERIOR WALL IN END DIASTOLE: 1.3 CM
LEFT VENTRICULAR STROKE VOLUME: 30 ML
LV EF US.2D.A4C+ESTIMATED: 55 %
LVSV (TEICH): 30 ML
MV E'TISSUE VEL-SEP: 5 CM/S
MV PEAK A VEL: 0.77 M/S
MV PEAK E VEL: 50 CM/S
MV STENOSIS PRESSURE HALF TIME: 81 MS
MV VALVE AREA P 1/2 METHOD: 2.72
RA PRESSURE ESTIMATED: 3 MMHG
RIGHT ATRIUM AREA SYSTOLE A4C: 11.6 CM2
RIGHT VENTRICLE ID DIMENSION: 3.4 CM
RV PSP: 40 MMHG
SINOTUBULAR JUNCTION: 3.8 CM
SL CV LEFT ATRIUM LENGTH A2C: 5.2 CM
SL CV LV EF: 55
SL CV PED ECHO LEFT VENTRICLE DIASTOLIC VOLUME (MOD BIPLANE) 2D: 52 ML
SL CV PED ECHO LEFT VENTRICLE SYSTOLIC VOLUME (MOD BIPLANE) 2D: 23 ML
STJ: 3.8 CM
TR MAX PG: 37 MMHG
TR PEAK VELOCITY: 3 M/S
TRICUSPID ANNULAR PLANE SYSTOLIC EXCURSION: 2.2 CM
TRICUSPID VALVE PEAK REGURGITATION VELOCITY: 3.03 M/S

## 2025-04-01 PROCEDURE — 93306 TTE W/DOPPLER COMPLETE: CPT

## 2025-04-01 PROCEDURE — 93306 TTE W/DOPPLER COMPLETE: CPT | Performed by: INTERNAL MEDICINE

## 2025-04-01 NOTE — TELEPHONE ENCOUNTER
Patient would like Dr. Colmenares to reach back out to her. I tried to relay the message she did not even let me completely read the message and she stated that she does not understands and would like  To call her back. At this point she does not know that you referred her to cardio and would like her to see the cardiologist.  Thank you!!

## 2025-04-03 PROBLEM — I35.0 SEVERE AORTIC STENOSIS: Status: ACTIVE | Noted: 2025-04-03

## 2025-04-03 PROBLEM — I35.0 MODERATE AORTIC STENOSIS: Status: RESOLVED | Noted: 2022-11-25 | Resolved: 2025-04-03

## 2025-04-04 ENCOUNTER — TELEPHONE (OUTPATIENT)
Age: 75
End: 2025-04-04

## 2025-04-04 NOTE — TELEPHONE ENCOUNTER
I explained to patient that she should call back the cardiologist and tell them she wants an appt with the [place] office and that she might have to be put on a wait list. Patient will be calling back to make an appt.

## 2025-04-04 NOTE — TELEPHONE ENCOUNTER
Patient had called in and stated that she made an appointment with a cardiologist that dr. Darrian healy suggested.     She had mentioned that this cardiologist, is a little out of her way, and was wondering if there was somewhere closer to the area.     Please advise out to patient when possible

## 2025-04-28 ENCOUNTER — HOSPITAL ENCOUNTER (OUTPATIENT)
Dept: INFUSION CENTER | Facility: HOSPITAL | Age: 75
Discharge: HOME | End: 2025-04-28
Payer: MEDICARE

## 2025-04-28 DIAGNOSIS — C54.1 ENDOMETRIAL CANCER (HCC): ICD-10-CM

## 2025-04-28 DIAGNOSIS — I82.412 ACUTE DEEP VEIN THROMBOSIS (DVT) OF FEMORAL VEIN OF LEFT LOWER EXTREMITY (HCC): ICD-10-CM

## 2025-04-28 DIAGNOSIS — Z45.2 ENCOUNTER FOR CENTRAL LINE CARE: Primary | ICD-10-CM

## 2025-04-28 DIAGNOSIS — E78.2 MIXED HYPERLIPIDEMIA: ICD-10-CM

## 2025-04-28 DIAGNOSIS — R68.89 OTHER GENERAL SYMPTOMS AND SIGNS: ICD-10-CM

## 2025-04-28 LAB
ALBUMIN SERPL BCG-MCNC: 4.1 G/DL (ref 3.5–5)
ALP SERPL-CCNC: 69 U/L (ref 34–104)
ALT SERPL W P-5'-P-CCNC: 8 U/L (ref 7–52)
AMYLASE SERPL-CCNC: 55 IU/L (ref 29–103)
ANION GAP SERPL CALCULATED.3IONS-SCNC: 8 MMOL/L (ref 4–13)
AST SERPL W P-5'-P-CCNC: 14 U/L (ref 13–39)
BASOPHILS # BLD AUTO: 0.03 THOUSANDS/ÂΜL (ref 0–0.1)
BASOPHILS NFR BLD AUTO: 1 % (ref 0–1)
BILIRUB SERPL-MCNC: 0.53 MG/DL (ref 0.2–1)
BUN SERPL-MCNC: 11 MG/DL (ref 5–25)
CALCIUM SERPL-MCNC: 9.3 MG/DL (ref 8.4–10.2)
CHLORIDE SERPL-SCNC: 105 MMOL/L (ref 96–108)
CO2 SERPL-SCNC: 26 MMOL/L (ref 21–32)
CREAT SERPL-MCNC: 0.88 MG/DL (ref 0.6–1.3)
EOSINOPHIL # BLD AUTO: 0.07 THOUSAND/ÂΜL (ref 0–0.61)
EOSINOPHIL NFR BLD AUTO: 2 % (ref 0–6)
ERYTHROCYTE [DISTWIDTH] IN BLOOD BY AUTOMATED COUNT: 11.9 % (ref 11.6–15.1)
GFR SERPL CREATININE-BSD FRML MDRD: 63 ML/MIN/1.73SQ M
GLUCOSE SERPL-MCNC: 91 MG/DL (ref 65–140)
HCT VFR BLD AUTO: 37.5 % (ref 34.8–46.1)
HGB BLD-MCNC: 12.3 G/DL (ref 11.5–15.4)
IMM GRANULOCYTES # BLD AUTO: 0.01 THOUSAND/UL (ref 0–0.2)
IMM GRANULOCYTES NFR BLD AUTO: 0 % (ref 0–2)
LIPASE SERPL-CCNC: 49 U/L (ref 11–82)
LYMPHOCYTES # BLD AUTO: 0.62 THOUSANDS/ÂΜL (ref 0.6–4.47)
LYMPHOCYTES NFR BLD AUTO: 15 % (ref 14–44)
MAGNESIUM SERPL-MCNC: 2 MG/DL (ref 1.9–2.7)
MCH RBC QN AUTO: 32.2 PG (ref 26.8–34.3)
MCHC RBC AUTO-ENTMCNC: 32.8 G/DL (ref 31.4–37.4)
MCV RBC AUTO: 98 FL (ref 82–98)
MONOCYTES # BLD AUTO: 0.34 THOUSAND/ÂΜL (ref 0.17–1.22)
MONOCYTES NFR BLD AUTO: 8 % (ref 4–12)
NEUTROPHILS # BLD AUTO: 3.21 THOUSANDS/ÂΜL (ref 1.85–7.62)
NEUTS SEG NFR BLD AUTO: 74 % (ref 43–75)
NRBC BLD AUTO-RTO: 0 /100 WBCS
PLATELET # BLD AUTO: 34 THOUSANDS/UL (ref 149–390)
POTASSIUM SERPL-SCNC: 3.9 MMOL/L (ref 3.5–5.3)
PROT SERPL-MCNC: 6.8 G/DL (ref 6.4–8.4)
RBC # BLD AUTO: 3.82 MILLION/UL (ref 3.81–5.12)
SODIUM SERPL-SCNC: 139 MMOL/L (ref 135–147)
TSH SERPL DL<=0.05 MIU/L-ACNC: 2.02 UIU/ML (ref 0.45–4.5)
WBC # BLD AUTO: 4.28 THOUSAND/UL (ref 4.31–10.16)

## 2025-04-28 PROCEDURE — 84443 ASSAY THYROID STIM HORMONE: CPT

## 2025-04-28 PROCEDURE — 80053 COMPREHEN METABOLIC PANEL: CPT

## 2025-04-28 PROCEDURE — 83690 ASSAY OF LIPASE: CPT

## 2025-04-28 PROCEDURE — 82150 ASSAY OF AMYLASE: CPT

## 2025-04-28 PROCEDURE — 85025 COMPLETE CBC W/AUTO DIFF WBC: CPT

## 2025-04-28 PROCEDURE — 86304 IMMUNOASSAY TUMOR CA 125: CPT

## 2025-04-28 PROCEDURE — 83735 ASSAY OF MAGNESIUM: CPT

## 2025-04-28 NOTE — PLAN OF CARE
Problem: Potential for Falls  Goal: Patient will remain free of falls  Description: INTERVENTIONS:- Educate patient/family on patient safety including physical limitations- Instruct patient to call for assistance with activity - Consult OT/PT to assist with strengthening/mobility - Keep Call bell within reach- Keep bed low and locked with side rails adjusted as appropriate- Keep care items and personal belongings within reach- Initiate and maintain comfort rounds- Make Fall Risk Sign visible to staff- Offer Toileting every Apply yellow socks and bracelet for high fall risk patients- Consider moving patient to room near nurses station  Outcome: Progressing     Problem: Knowledge Deficit  Goal: Patient/family/caregiver demonstrates understanding of disease process, treatment plan, medications, and discharge instructions  Description: Complete learning assessment and assess knowledge base.Interventions:- Provide teaching at level of understanding- Provide teaching via preferred learning methods  Outcome: Progressing      Name band;

## 2025-04-28 NOTE — PROGRESS NOTES
Nallely Peacock  tolerated treatment well with no complications.      Nallely Peacock is aware of future appt on 5/2/25 at 1030.     AVS printed and given to Nallely Peacock:  Yes

## 2025-04-28 NOTE — TELEPHONE ENCOUNTER
apixaban (Eliquis) 5 mg - 2 1/2 days of medicine left, please call her when the meds are sent out so she knows since [facility] has not been contacting her. Best # [phone]  [id number] - [place] - [address] [phone]

## 2025-04-29 LAB — CANCER AG125 SERPL-ACNC: 3.8 U/ML (ref 0–35)

## 2025-05-01 RX ORDER — SODIUM CHLORIDE 9 MG/ML
20 INJECTION, SOLUTION INTRAVENOUS ONCE
Status: CANCELLED | OUTPATIENT
Start: 2025-05-02

## 2025-05-02 ENCOUNTER — TELEPHONE (OUTPATIENT)
Age: 75
End: 2025-05-02

## 2025-05-02 ENCOUNTER — HOSPITAL ENCOUNTER (OUTPATIENT)
Dept: INFUSION CENTER | Facility: HOSPITAL | Age: 75
End: 2025-05-02
Attending: OBSTETRICS & GYNECOLOGY
Payer: MEDICARE

## 2025-05-02 VITALS
DIASTOLIC BLOOD PRESSURE: 71 MMHG | OXYGEN SATURATION: 99 % | SYSTOLIC BLOOD PRESSURE: 117 MMHG | HEART RATE: 71 BPM | TEMPERATURE: 98.3 F | RESPIRATION RATE: 18 BRPM | HEIGHT: 64 IN | BODY MASS INDEX: 23.68 KG/M2

## 2025-05-02 DIAGNOSIS — C54.1 ENDOMETRIAL CANCER (HCC): Primary | ICD-10-CM

## 2025-05-02 PROCEDURE — 96413 CHEMO IV INFUSION 1 HR: CPT

## 2025-05-02 PROCEDURE — 96415 CHEMO IV INFUSION ADDL HR: CPT

## 2025-05-02 RX ORDER — SODIUM CHLORIDE 9 MG/ML
20 INJECTION, SOLUTION INTRAVENOUS ONCE
Status: COMPLETED | OUTPATIENT
Start: 2025-05-02 | End: 2025-05-02

## 2025-05-02 RX ADMIN — SODIUM CHLORIDE 20 ML/HR: 0.9 INJECTION, SOLUTION INTRAVENOUS at 10:27

## 2025-05-02 RX ADMIN — SODIUM CHLORIDE 1000 MG: 9 INJECTION, SOLUTION INTRAVENOUS at 11:09

## 2025-05-02 NOTE — PROGRESS NOTES
Nallely Peacock  tolerated treatment well with no complications.      Nallely Peacock is aware of future appt on 06/09/2025 at 11:30 AM.     AVS printed and given to Nallely Peacock:  Yes

## 2025-05-02 NOTE — PLAN OF CARE
Problem: Knowledge Deficit  Goal: Patient/family/caregiver demonstrates understanding of disease process, treatment plan, medications, and discharge instructions  Description: Complete learning assessment and assess knowledge base.Interventions:- Provide teaching at level of understanding- Provide teaching via preferred learning methods  Outcome: Progressing     Problem: Potential for Falls  Goal: Patient will remain free of falls  Description: INTERVENTIONS:- Educate patient/family on patient safety including physical limitations- Instruct patient to call for assistance with activity - Consult OT/PT to assist with strengthening/mobility - Keep Call bell within reach- Keep bed low and locked with side rails adjusted as appropriate- Keep care items and personal belongings within reach- Initiate and maintain comfort rounds- Make Fall Risk Sign visible to staff- Offer Toileting every x Hours, in advance of need- Initiate/Maintain xalarm- Obtain necessary fall risk management equipment: x- Apply yellow socks and bracelet for high fall risk patients- Consider moving patient to room near nurses station  Outcome: Progressing

## 2025-05-05 ENCOUNTER — OFFICE VISIT (OUTPATIENT)
Dept: ENDOCRINOLOGY | Facility: HOSPITAL | Age: 75
End: 2025-05-05
Payer: MEDICARE

## 2025-05-05 VITALS
HEART RATE: 82 BPM | WEIGHT: 141.8 LBS | DIASTOLIC BLOOD PRESSURE: 74 MMHG | HEIGHT: 64 IN | BODY MASS INDEX: 24.21 KG/M2 | SYSTOLIC BLOOD PRESSURE: 126 MMHG

## 2025-05-05 DIAGNOSIS — E05.90 HYPERTHYROIDISM: Primary | ICD-10-CM

## 2025-05-05 PROCEDURE — G2211 COMPLEX E/M VISIT ADD ON: HCPCS | Performed by: PHYSICIAN ASSISTANT

## 2025-05-05 PROCEDURE — 99213 OFFICE O/P EST LOW 20 MIN: CPT | Performed by: PHYSICIAN ASSISTANT

## 2025-05-05 NOTE — PATIENT INSTRUCTIONS
Continue methimazole 5 mg daily.     Get lab work completed.     Call with any concerns or questions.     Follow up in 3 months with labs.

## 2025-05-05 NOTE — ASSESSMENT & PLAN NOTE
TSH continues to improve.  Unfortunately she has not gotten the lab work for this office and just has been getting it through heme-onc which they are just doing a TSH with reflex T4.  At this time since TSH is normal and she is clinically euthyroid we will continue with methimazole 5 mg daily.  I asked her to get TSH with free T3 and free T4 with next set of lab work.  At that time we will determine if we need to make adjustments to her methimazole.  Contact the office with any concerns or questions.  Follow-up in 3 months with labwork completed prior to visit.  Orders:  •  TSH, 3rd generation; Future  •  T4, free; Future  •  T3, free; Future  •  TSH, 3rd generation; Future  •  T4, free; Future  •  T3, free; Future
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
## 2025-05-08 ENCOUNTER — HOSPITAL ENCOUNTER (OUTPATIENT)
Dept: CT IMAGING | Facility: HOSPITAL | Age: 75
Discharge: HOME | End: 2025-05-08
Attending: OBSTETRICS & GYNECOLOGY
Payer: MEDICARE

## 2025-05-08 DIAGNOSIS — C54.1 ENDOMETRIAL CANCER (HCC): ICD-10-CM

## 2025-05-08 PROCEDURE — 74177 CT ABD & PELVIS W/CONTRAST: CPT

## 2025-05-08 PROCEDURE — 71260 CT THORAX DX C+: CPT

## 2025-05-08 RX ADMIN — IOHEXOL 50 ML: 350 INJECTION, SOLUTION INTRAVENOUS at 09:53

## 2025-05-22 ENCOUNTER — APPOINTMENT (OUTPATIENT)
Dept: LAB | Facility: HOSPITAL | Age: 75
End: 2025-05-22
Payer: MEDICARE

## 2025-05-22 ENCOUNTER — OFFICE VISIT (OUTPATIENT)
Age: 75
End: 2025-05-22
Payer: MEDICARE

## 2025-05-22 VITALS
RESPIRATION RATE: 18 BRPM | DIASTOLIC BLOOD PRESSURE: 88 MMHG | TEMPERATURE: 97.8 F | SYSTOLIC BLOOD PRESSURE: 162 MMHG | BODY MASS INDEX: 24.31 KG/M2 | HEIGHT: 64 IN | WEIGHT: 142.4 LBS | HEART RATE: 78 BPM | OXYGEN SATURATION: 96 %

## 2025-05-22 DIAGNOSIS — D69.6 THROMBOCYTOPENIA (HCC): ICD-10-CM

## 2025-05-22 DIAGNOSIS — D69.6 THROMBOCYTOPENIA (HCC): Primary | ICD-10-CM

## 2025-05-22 DIAGNOSIS — I82.412 ACUTE DEEP VEIN THROMBOSIS (DVT) OF FEMORAL VEIN OF LEFT LOWER EXTREMITY (HCC): ICD-10-CM

## 2025-05-22 DIAGNOSIS — C54.1 ENDOMETRIAL CANCER (HCC): Primary | ICD-10-CM

## 2025-05-22 LAB
BASOPHILS # BLD AUTO: 0.04 THOUSANDS/ÂΜL (ref 0–0.1)
BASOPHILS NFR BLD AUTO: 1 % (ref 0–1)
EOSINOPHIL # BLD AUTO: 0.07 THOUSAND/ÂΜL (ref 0–0.61)
EOSINOPHIL NFR BLD AUTO: 2 % (ref 0–6)
ERYTHROCYTE [DISTWIDTH] IN BLOOD BY AUTOMATED COUNT: 12.3 % (ref 11.6–15.1)
HCT VFR BLD AUTO: 43.8 % (ref 34.8–46.1)
HGB BLD-MCNC: 13.7 G/DL (ref 11.5–15.4)
IMM GRANULOCYTES # BLD AUTO: 0.02 THOUSAND/UL (ref 0–0.2)
IMM GRANULOCYTES NFR BLD AUTO: 1 % (ref 0–2)
LYMPHOCYTES # BLD AUTO: 0.74 THOUSANDS/ÂΜL (ref 0.6–4.47)
LYMPHOCYTES NFR BLD AUTO: 19 % (ref 14–44)
MCH RBC QN AUTO: 30.8 PG (ref 26.8–34.3)
MCHC RBC AUTO-ENTMCNC: 31.3 G/DL (ref 31.4–37.4)
MCV RBC AUTO: 98 FL (ref 82–98)
MONOCYTES # BLD AUTO: 0.32 THOUSAND/ÂΜL (ref 0.17–1.22)
MONOCYTES NFR BLD AUTO: 8 % (ref 4–12)
NEUTROPHILS # BLD AUTO: 2.79 THOUSANDS/ÂΜL (ref 1.85–7.62)
NEUTS SEG NFR BLD AUTO: 69 % (ref 43–75)
NRBC BLD AUTO-RTO: 0 /100 WBCS
PLATELET # BLD AUTO: 41 THOUSANDS/UL (ref 149–390)
PMV BLD AUTO: 10.4 FL (ref 8.9–12.7)
RBC # BLD AUTO: 4.45 MILLION/UL (ref 3.81–5.12)
WBC # BLD AUTO: 3.98 THOUSAND/UL (ref 4.31–10.16)

## 2025-05-22 PROCEDURE — 36415 COLL VENOUS BLD VENIPUNCTURE: CPT

## 2025-05-22 PROCEDURE — 85025 COMPLETE CBC W/AUTO DIFF WBC: CPT

## 2025-05-22 PROCEDURE — 99215 OFFICE O/P EST HI 40 MIN: CPT | Performed by: OBSTETRICS & GYNECOLOGY

## 2025-05-22 PROCEDURE — G2211 COMPLEX E/M VISIT ADD ON: HCPCS | Performed by: OBSTETRICS & GYNECOLOGY

## 2025-05-22 RX ORDER — PREDNISONE 20 MG/1
60 TABLET ORAL DAILY
Qty: 30 TABLET | Refills: 1 | Status: SHIPPED | OUTPATIENT
Start: 2025-05-22

## 2025-05-22 NOTE — PROGRESS NOTES
Name: Nallely Peacock      : 1950      MRN: [id number]  Encounter Provider: Lavon Jacome MD  Encounter Date: 2025   Encounter department: [facility] GYNECOLOGY ONCOLOGY [facility]  :  Assessment & Plan  Endometrial cancer (HCC)  75-year-old with stage III C2 high-grade endometrial cancer, favor a germ cell type who completed neoadjuvant chemotherapy, surgery with suboptimal debulking, adjuvant chemotherapy and is currently on maintenance dostarlimab therapy.  She last received chemotherapy in 2025.  I reviewed CBC, amylase, lipase, TSH, magnesium, CMP, , CT chest abdomen and pelvis images.  Her performance status is 1.  1.  Plan to hold dostarlimab maintenance therapy due to persistent grade 3 thrombocytopenia possibly from immunotherapy treatment.  2.  There is persistent measurable pelvic and periaortic lymphadenopathy.  Pathology at the time of lymph node debulking did not reveal any viable tumor.  As a result, CT DNA testing is not feasible.  If the thrombocytopenia is immunotherapy induced, can consider restarting immunotherapy if platelet counts recover.  Otherwise, would consider radiation.  Orders:    predniSONE 20 mg tablet; Take 3 tablets (60 mg total) by mouth daily    apixaban (Eliquis) 2.5 mg; Take 1 tablet (2.5 mg total) by mouth 2 (two) times a day    CBC and differential; Future    Acute deep vein thrombosis (DVT) of femoral vein of left lower extremity (HCC)  Due to thrombocytopenia with current platelet count 41,000, will decrease apixaban to 2.5 mg twice daily.       Thrombocytopenia (HCC)  Platelet count currently 41K from 34 on 2025.  She has easy bruising.  This may be a grade 3 thrombocytopenia from dostarlimab.  1. hold dostarlimab  2.  Prednisone 1 mg/kg - 60 mg daily for 7 days.  She will then need a steroid taper.  Plan to repeat CBC after 1 week of high-dose steroids.  Referral to hematology if no  improvement.  Orders:    predniSONE 20 mg tablet; Take 3 tablets (60 mg total) by mouth daily    CBC and differential; Future      Assessment & Plan            History of Present Illness   Reason for Visit / CC: Pretreatment evaluation   Nallely Peacock is a 75 y.o. female   History of Present Illness  Returns for pretreatment evaluation.  She is that she is ambulating without the assistance of a walker at home.  She is eating well.  She has no pain.  No vaginal bleeding.  She does note easy bruising.  She also has bleeding after IV placements.  Labs from 4/28/2025 revealed a platelet count of 34K.  The platelet count was 31K 3/17/2025.  CMP 4/28/2025 revealed a normal serum creatinine of 0.88.  She had mild leukopenia with hemoglobin of 12.3 g/dL.  Total bilirubin was normal.  Overall, her quality of life is good.  She had a CT scan of the chest abdomen pelvis on 5/8/2025 that revealed unchanged pulmonary nodules, multiple thyroid nodules which are stable, stable liver cyst, unchanged pelvic empiric lymph nodes.  I personally reviewed the images.  Pertinent Medical History   DVT       Oncology History   Cancer Staging   Endometrial cancer (HCC)  Staging form: Corpus Uteri - Carcinoma, AJCC 8th Edition  - Clinical: FIGO Stage IIIC2 - Signed by Mansoor Alonso MD on 8/29/2024  Histopathologic type: Endometrioid adenocarcinoma, NOS  Stage prefix: Initial diagnosis  Histologic grade (G): G3  Histologic grading system: 3 grade system  Specimen type: Biopsy / Limited Resection  - Pathologic: No stage assigned - Signed by Mansoor Alonso MD on 8/29/2024  Stage used in treatment planning: Yes  Staging comments: CT CAP with clinical stage IIIC2 disease (retroperitoneal and aortocaval lymphadenopathy) vs IVB (possible bowel mesentery disease).   Oncology History   Endometrial cancer (HCC)   7/18/2024 Initial Diagnosis    Endometrial cancer (HCC)     8/13/2024 -  Chemotherapy    Taxol 135 mg/m2, carboplatin AUC 4,  dostarlimab 500 mg IV every 21 days.     Carboplatin dose-reduced with cycle 3 from AUC 5 to AUC 4 d/t neutropenia.       8/29/2024 -  Cancer Staged    clinical stage IIIC2 disease (retroperitoneal and aortocaval lymphadenopathy) vs IVB (possible bowel mesentery disease) based on imaging  Staging form: Corpus Uteri - Carcinoma, AJCC 8th Edition  - Clinical: FIGO Stage IIIC2 - Signed by Mansoor Alonso MD on 8/29/2024  Histopathologic type: Endometrioid adenocarcinoma, NOS  Stage prefix: Initial diagnosis  Histologic grade (G): G3  Histologic grading system: 3 grade system  Specimen type: Biopsy / Limited Resection       10/29/2024 Surgery    ROBOTIC TOTAL LAPAROSCOPIC HYSTERECTOMY. BILATERAL SALPINGO-OOPHERECTOMY  Bilateral - BILATERAL PARAAORTIC LYMPH NODE DISSECTION. DEBULKING  -Suboptimal debulking of periaortic lymph nodes.  Final pathology consistent with metastatic disease with extensive therapy effects, high-grade malignancy.   CancerTYPE ID testing reportedly reports a 90% probability of germ cell origin. Additional immunohistochemical stains show subsets of the tumor staining for SALL4, LIN28 and SOX2, which would also support germ cell origin. While the overall diagnosis of undifferentiated / poorly differentiated malignancy remains unchanged, these findings together favor (or at least raise the possibility of) this being a poorly differentiated germ cell tumor.        10/29/2024 Genomic Testing      Had a genetics consult. Determined no need for germline testing.        Review of Systems   Constitutional:  Negative for activity change and unexpected weight change.   HENT: Negative.     Eyes: Negative.    Respiratory: Negative.     Cardiovascular: Negative.    Gastrointestinal:  Negative for abdominal distention and abdominal pain.   Endocrine: Negative.    Genitourinary:  Negative for pelvic pain and vaginal bleeding.   Musculoskeletal: Negative.    Skin: Negative.    Allergic/Immunologic: Negative.  "   Neurological: Negative.    Hematological:  Bruises/bleeds easily.   Psychiatric/Behavioral:  The patient is nervous/anxious.     A complete review of systems is negative other than that noted above in the HPI.  Medications Ordered Prior to Encounter[1]      Objective   /88 (BP Location: Left arm, Patient Position: Sitting, Cuff Size: Standard)   Pulse 78   Temp 97.8 °F (36.6 °C) (Temporal)   Resp 18   Ht 5' 4.02\" (1.626 m)   Wt 64.6 kg (142 lb 6.4 oz)   SpO2 96%   BMI 24.43 kg/m²     Body mass index is 24.43 kg/m².  Pain Screening:  Pain Score: 0-No pain  ECOG   1  Physical Exam  Vitals reviewed.   Constitutional:       General: She is not in acute distress.     Appearance: Normal appearance. She is normal weight. She is not ill-appearing.   HENT:      Head: Normocephalic and atraumatic.      Mouth/Throat:      Mouth: Mucous membranes are moist.     Eyes:      General: No scleral icterus.        Right eye: No discharge.         Left eye: No discharge.      Conjunctiva/sclera: Conjunctivae normal.     Pulmonary:      Effort: Pulmonary effort is normal.     Musculoskeletal:      Right lower leg: Edema present.      Left lower leg: Edema present.      Comments: 1+ bilateral edema     Skin:     General: Skin is warm and dry.      Coloration: Skin is not jaundiced.      Findings: Bruising present. No rash.      Comments: Bruising on her arms.  No petechial rash on the lower extremities.     Neurological:      General: No focal deficit present.      Mental Status: She is alert and oriented to person, place, and time.      Cranial Nerves: No cranial nerve deficit.      Sensory: No sensory deficit.      Motor: No weakness.      Gait: Gait normal.     Psychiatric:         Mood and Affect: Mood normal.         Behavior: Behavior normal.         Thought Content: Thought content normal.         Judgment: Judgment normal.       Physical Exam       Results    Labs: I have reviewed pertinent labs.   Lab Results "   Component Value Date/Time     3.8 04/28/2025 12:04 PM     Lab Results   Component Value Date/Time    Potassium 3.9 04/28/2025 12:04 PM    Chloride 105 04/28/2025 12:04 PM    CO2 26 04/28/2025 12:04 PM    BUN 11 04/28/2025 12:04 PM    Creatinine 0.88 04/28/2025 12:04 PM    Glucose, Fasting 83 12/02/2024 11:10 AM    Calcium 9.3 04/28/2025 12:04 PM    AST 14 04/28/2025 12:04 PM    ALT 8 04/28/2025 12:04 PM    Alkaline Phosphatase 69 04/28/2025 12:04 PM    eGFR 63 04/28/2025 12:04 PM     Lab Results   Component Value Date/Time    WBC 3.98 (L) 05/22/2025 11:41 AM    Hemoglobin 13.7 05/22/2025 11:41 AM    Hematocrit 43.8 05/22/2025 11:41 AM    MCV 98 05/22/2025 11:41 AM    Platelets 41 (L) 05/22/2025 11:41 AM     Lab Results   Component Value Date/Time    Absolute Neutrophils 2.79 05/22/2025 11:41 AM        Trend:  Lab Results   Component Value Date     3.8 04/28/2025     3.7 03/17/2025     6.8 02/03/2025     4.8 01/13/2025     6.1 12/23/2024     12.8 12/02/2024     6.4 10/22/2024     6.9 10/08/2024     6.4 10/01/2024     9.6 09/12/2024     33.7 08/29/2024     61.1 (H) 08/22/2024     49.8 (H) 08/08/2024       Radiology Results Review: I personally reviewed the following image studies in PACS and associated radiology reports: CT chest and CT abdomen/pelvis. My interpretation of the radiology images/reports is: Measurable lymphadenopathy, periaortic and pelvic.  No evidence of peritoneal disease.  No new measurable disease.  Stable liver cyst, unchanged pulmonary nodules.             [1]   Current Outpatient Medications on File Prior to Visit   Medication Sig Dispense Refill    acetaminophen (TYLENOL) 160 MG chewable tablet Chew 160 mg every 6 (six) hours as needed for mild pain      amLODIPine (NORVASC) 10 mg tablet Take 1 tablet (10 mg total) by mouth daily 90 tablet 3    calcium carbonate (Calcium 600) 600 MG tablet Take 600 mg by mouth in the  morning and 600 mg in the evening. Take with meals.      methimazole (TAPAZOLE) 5 mg tablet TAKE 1 TABLET (5 MG TOTAL) BY MOUTH IN THE MORNING 90 tablet 1    Multiple Vitamin (MULTIVITAMIN ADULT PO) Take 1 tablet by mouth in the morning.      simvastatin (ZOCOR) 10 mg tablet Take 1 tablet (10 mg total) by mouth daily at bedtime 90 tablet 3    [DISCONTINUED] apixaban (Eliquis) 5 mg Take 1 tablet (5 mg total) by mouth 2 (two) times a day 60 tablet 5    diphenhydrAMINE HCl (BENADRYL PO) Take by mouth (Patient not taking: Reported on 1/21/2025)      LORazepam (ATIVAN) 1 mg tablet Take 1 tablet (1 mg total) by mouth every 8 (eight) hours as needed (nausea or anxiety) (Patient not taking: Reported on 10/25/2024) 20 tablet 0    ondansetron (ZOFRAN) 8 mg tablet Take 1 tablet (8 mg total) by mouth every 8 (eight) hours as needed for nausea or vomiting (Patient not taking: Reported on 1/21/2025) 30 tablet 1     No current facility-administered medications on file prior to visit.

## 2025-05-22 NOTE — ASSESSMENT & PLAN NOTE
Due to thrombocytopenia with current platelet count 41,000, will decrease apixaban to 2.5 mg twice daily.

## 2025-05-22 NOTE — ASSESSMENT & PLAN NOTE
Platelet count currently 41K from 34 on 4/28/2025.  She has easy bruising.  This may be a grade 3 thrombocytopenia from dostarlimab.  1. hold dostarlimab  2.  Prednisone 1 mg/kg - 60 mg daily for 7 days.  She will then need a steroid taper.  Plan to repeat CBC after 1 week of high-dose steroids.  Referral to hematology if no improvement.  Orders:    predniSONE 20 mg tablet; Take 3 tablets (60 mg total) by mouth daily    CBC and differential; Future

## 2025-05-22 NOTE — ASSESSMENT & PLAN NOTE
75-year-old with stage III C2 high-grade endometrial cancer, favor a germ cell type who completed neoadjuvant chemotherapy, surgery with suboptimal debulking, adjuvant chemotherapy and is currently on maintenance dostarlimab therapy.  She last received chemotherapy in January 2025.  I reviewed CBC, amylase, lipase, TSH, magnesium, CMP, , CT chest abdomen and pelvis images.  Her performance status is 1.  1.  Plan to hold dostarlimab maintenance therapy due to persistent grade 3 thrombocytopenia possibly from immunotherapy treatment.  2.  There is persistent measurable pelvic and periaortic lymphadenopathy.  Pathology at the time of lymph node debulking did not reveal any viable tumor.  As a result, CT DNA testing is not feasible.  If the thrombocytopenia is immunotherapy induced, can consider restarting immunotherapy if platelet counts recover.  Otherwise, would consider radiation.  Orders:    predniSONE 20 mg tablet; Take 3 tablets (60 mg total) by mouth daily    apixaban (Eliquis) 2.5 mg; Take 1 tablet (2.5 mg total) by mouth 2 (two) times a day    CBC and differential; Future

## 2025-05-23 ENCOUNTER — TELEPHONE (OUTPATIENT)
Age: 75
End: 2025-05-23

## 2025-05-23 ENCOUNTER — TELEPHONE (OUTPATIENT)
Dept: GYNECOLOGIC ONCOLOGY | Facility: CLINIC | Age: 75
End: 2025-05-23

## 2025-05-23 NOTE — TELEPHONE ENCOUNTER
Pt asked about how to take her prednisone.    Reviewed her RX and let her know to take with food and that it could cause her to be wakeful at night, to take in the morning.

## 2025-05-23 NOTE — TELEPHONE ENCOUNTER
----- Message from Lavon Jacome MD sent at 5/22/2025  3:21 PM EDT -----  I put her treatment plan on hold and gave her 1mg/kg prednisone for 7 days. I asked her to go for another CBC next week and I cut her eliquis dose to 2.5 BID. Please give her a call tomorrow to reiterate the plan. She will also need a prescription for a steroid taper if her platelet counts recover with the high dose steroids. Thanks!

## 2025-05-23 NOTE — TELEPHONE ENCOUNTER
Outgoing call to Nallely to go over plan for repeat CBC next week, and to ask if she picked up her medications yesterday from the pharmacy. LMOM for patient to call me back with questions. Will f/u with CBC next week and send steroid taper if there is improvement.

## 2025-05-27 ENCOUNTER — TELEPHONE (OUTPATIENT)
Age: 75
End: 2025-05-27

## 2025-05-27 NOTE — TELEPHONE ENCOUNTER
Spoke with patient who inquired about reason she is on the steroids. Reviewed rationale with her and advised to repeat labs Thursday/Friday to re-asses platelet count. She verbalized an understanding.    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 She had significant bronchospasm prior to extubation in the OR (which also occurred earlier this week at her pre-Fontan cath), for which she was extubated under deep sedation and given glycopyrrolate & two doses of airway decadron. She arrived hemodynamically stable on facemask, milrinone, epi, vasopressin & precedex infusions. She is hemodynamically stable but at high risk for acute decompensation and requires care and monitoring in the CICU.      CV:  - Continuous cardiopulmonary/telemetry monitoring.  - Continue Milrinone 0.5. Transition to Enalapril once taking PO  - Vasopressin at 0.2 (per fontan protocol)-- discontinued this morning.***  - Lasix 10 mg IV q6h goal negative.   - Start Diuril BID ***  - EKGs as baseline and as indicated.  - Rhythm: NSR, A wires in place; threshold 2.5.   - Careful monitoring of chest tubes output. Notify cardiology if > 2-3cc/kg/hr, or if abrupt cessation of output.    RESP:  - Continue 1-2 L via venturi mask or nasal cannula. Goal SpO2 > 92%.  -CXR daily  - ABGs q12h today and then daily tomorrow    FEN/GI:  - Strict electrolyte control; maintain K ~3.5, Mg ~2.0, and iCa ~1-1.2. Total fluids ~60% maintenance.  - Careful monitoring of urine output, goal > 1cc/kg/hr.   - Low Fat diet (30-35 grams/day)  - KVO IVF  -Total fluids 2/3 x maintenance  - Daily CMP    ID:  - Perioperative Ancef x48h    HEME:  - Start Aspirin daily  - Blood products as needed, as per transfusion protocol.  - Can start aspirin when tolerating PO    NEURO/PAIN:  - Tylenol and Toradol IV ATC  - Morphine prn  -s/p Precedex      - RIJ CVL (3/15 - )  - R rad A-line 3/15-  - PIV  - B/l chest tubes x2 (3/15 - )  - S/p Constantino    Labs: ABGs q12h today, then daily. Daily CMP. CBC & coags tomorrow morning. 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 She had significant bronchospasm prior to extubation in the OR (which also occurred earlier this week at her pre-Fontan cath), for which she was extubated under deep sedation and given glycopyrrolate & two doses of airway decadron. She arrived hemodynamically stable on facemask, milrinone, epi, vasopressin & precedex infusions. She is hemodynamically stable but at high risk for acute decompensation and requires care and monitoring in the CICU.    CV:  - Continuous cardiopulmonary/telemetry monitoring.  - Continue Milrinone 0.5.   - Vasopressin at 0.2 (per fontan protocol)-- discontinued this morning  - Lasix 10 mg IV q6h goal negative.   - Start Diuril BID  - EKGs as baseline and as indicated.  - Rhythm: NSR, A wires in place; threshold 2.5.   - Careful monitoring of chest tubes output. Notify cardiology if > 2-3cc/kg/hr, or if abrupt cessation of output.    RESP:  - Continue 1-2 L via venturi mask or nasal cannula. Goal SpO2 > 92%.  - CXR daily  - ABGs q12h     FEN/GI:  - Strict electrolyte control; maintain K ~3.5, Mg ~2.0, and iCa ~1-1.2. Total fluids ~60% maintenance.  - Careful monitoring of urine output, goal > 1cc/kg/hr.   - Low Fat diet (30-35 grams/day)  - KVO IVF  - Total fluids 2/3 x maintenance  - Daily CMP    ID:  - Perioperative Ancef x48h    HEME:  - Continue Aspirin daily-- 40.5 mg daily started last night. Will discuss dosing with CV surgery  - Blood products as needed, as per transfusion protocol.      NEURO/PAIN:  - Tylenol and Toradol IV ATC  - Morphine prn  - s/p Precedex      - RIJ CVL (3/15 - )  - R rad A-line 3/15-  - PIV  - B/l chest tubes x2 (3/15 - )  - S/p Constantino    Labs: ABGs q12h today, Daily CMP & CBC. Daily chest x-ray

## 2025-05-27 NOTE — TELEPHONE ENCOUNTER
Patient requests labs on Friday be drawn through the port as they had a difficult time last time drawing her blood. Requests call to schedule this.

## 2025-05-27 NOTE — TELEPHONE ENCOUNTER
Pt is requesting a call back, she has more questions about the labs. Pt can be reached at [phone]. Thank you.

## 2025-05-28 NOTE — TELEPHONE ENCOUNTER
Patient is scheduled for port labs and is aware. Patient states she needs blood work on 5/30 for doctor to check levels with new chemo medication.

## 2025-05-30 ENCOUNTER — HOSPITAL ENCOUNTER (OUTPATIENT)
Dept: INFUSION CENTER | Facility: HOSPITAL | Age: 75
Discharge: HOME | End: 2025-05-30
Payer: MEDICARE

## 2025-05-30 ENCOUNTER — RESULTS FOLLOW-UP (OUTPATIENT)
Dept: GYNECOLOGIC ONCOLOGY | Facility: CLINIC | Age: 75
End: 2025-05-30

## 2025-05-30 ENCOUNTER — TELEPHONE (OUTPATIENT)
Dept: GYNECOLOGIC ONCOLOGY | Facility: CLINIC | Age: 75
End: 2025-05-30

## 2025-05-30 DIAGNOSIS — R68.89 OTHER GENERAL SYMPTOMS AND SIGNS: ICD-10-CM

## 2025-05-30 DIAGNOSIS — E05.90 HYPERTHYROIDISM: ICD-10-CM

## 2025-05-30 DIAGNOSIS — C54.1 ENDOMETRIAL CANCER (HCC): ICD-10-CM

## 2025-05-30 DIAGNOSIS — Z45.2 ENCOUNTER FOR CENTRAL LINE CARE: Primary | ICD-10-CM

## 2025-05-30 DIAGNOSIS — D69.6 THROMBOCYTOPENIA (HCC): Primary | ICD-10-CM

## 2025-05-30 LAB
ALBUMIN SERPL BCG-MCNC: 3.9 G/DL (ref 3.5–5)
ALP SERPL-CCNC: 68 U/L (ref 34–104)
ALT SERPL W P-5'-P-CCNC: 12 U/L (ref 7–52)
AMYLASE SERPL-CCNC: 59 IU/L (ref 29–103)
ANION GAP SERPL CALCULATED.3IONS-SCNC: 7 MMOL/L (ref 4–13)
AST SERPL W P-5'-P-CCNC: 12 U/L (ref 13–39)
BASOPHILS # BLD AUTO: 0.01 THOUSANDS/ÂΜL (ref 0–0.1)
BASOPHILS NFR BLD AUTO: 0 % (ref 0–1)
BILIRUB SERPL-MCNC: 0.57 MG/DL (ref 0.2–1)
BUN SERPL-MCNC: 19 MG/DL (ref 5–25)
CALCIUM SERPL-MCNC: 9.3 MG/DL (ref 8.4–10.2)
CANCER AG125 SERPL-ACNC: 5.1 U/ML (ref 0–35)
CHLORIDE SERPL-SCNC: 104 MMOL/L (ref 96–108)
CO2 SERPL-SCNC: 29 MMOL/L (ref 21–32)
CREAT SERPL-MCNC: 0.94 MG/DL (ref 0.6–1.3)
EOSINOPHIL # BLD AUTO: 0.02 THOUSAND/ÂΜL (ref 0–0.61)
EOSINOPHIL NFR BLD AUTO: 0 % (ref 0–6)
ERYTHROCYTE [DISTWIDTH] IN BLOOD BY AUTOMATED COUNT: 12.9 % (ref 11.6–15.1)
GFR SERPL CREATININE-BSD FRML MDRD: 58 ML/MIN/1.73SQ M
GLUCOSE SERPL-MCNC: 85 MG/DL (ref 65–140)
HCT VFR BLD AUTO: 40.3 % (ref 34.8–46.1)
HGB BLD-MCNC: 13.2 G/DL (ref 11.5–15.4)
IMM GRANULOCYTES # BLD AUTO: 0.04 THOUSAND/UL (ref 0–0.2)
IMM GRANULOCYTES NFR BLD AUTO: 1 % (ref 0–2)
LIPASE SERPL-CCNC: 55 U/L (ref 11–82)
LYMPHOCYTES # BLD AUTO: 1.29 THOUSANDS/ÂΜL (ref 0.6–4.47)
LYMPHOCYTES NFR BLD AUTO: 21 % (ref 14–44)
MAGNESIUM SERPL-MCNC: 2.2 MG/DL (ref 1.9–2.7)
MCH RBC QN AUTO: 31.1 PG (ref 26.8–34.3)
MCHC RBC AUTO-ENTMCNC: 32.8 G/DL (ref 31.4–37.4)
MCV RBC AUTO: 95 FL (ref 82–98)
MONOCYTES # BLD AUTO: 0.73 THOUSAND/ÂΜL (ref 0.17–1.22)
MONOCYTES NFR BLD AUTO: 12 % (ref 4–12)
NEUTROPHILS # BLD AUTO: 4.07 THOUSANDS/ÂΜL (ref 1.85–7.62)
NEUTS SEG NFR BLD AUTO: 66 % (ref 43–75)
NRBC BLD AUTO-RTO: 0 /100 WBCS
PLATELET # BLD AUTO: 106 THOUSANDS/UL (ref 149–390)
PMV BLD AUTO: 9.6 FL (ref 8.9–12.7)
POTASSIUM SERPL-SCNC: 3.8 MMOL/L (ref 3.5–5.3)
PROT SERPL-MCNC: 6.7 G/DL (ref 6.4–8.4)
RBC # BLD AUTO: 4.25 MILLION/UL (ref 3.81–5.12)
SODIUM SERPL-SCNC: 140 MMOL/L (ref 135–147)
T3FREE SERPL-MCNC: 2.53 PG/ML (ref 2.5–3.9)
TSH SERPL DL<=0.05 MIU/L-ACNC: 1.41 UIU/ML (ref 0.45–4.5)
WBC # BLD AUTO: 6.16 THOUSAND/UL (ref 4.31–10.16)

## 2025-05-30 PROCEDURE — 82150 ASSAY OF AMYLASE: CPT

## 2025-05-30 PROCEDURE — 86304 IMMUNOASSAY TUMOR CA 125: CPT

## 2025-05-30 PROCEDURE — 83735 ASSAY OF MAGNESIUM: CPT

## 2025-05-30 PROCEDURE — 84481 FREE ASSAY (FT-3): CPT

## 2025-05-30 PROCEDURE — 84443 ASSAY THYROID STIM HORMONE: CPT

## 2025-05-30 PROCEDURE — 85025 COMPLETE CBC W/AUTO DIFF WBC: CPT

## 2025-05-30 PROCEDURE — 83690 ASSAY OF LIPASE: CPT

## 2025-05-30 PROCEDURE — 80053 COMPREHEN METABOLIC PANEL: CPT

## 2025-05-30 RX ORDER — PREDNISONE 10 MG/1
TABLET ORAL
Qty: 62 TABLET | Refills: 0 | Status: SHIPPED | OUTPATIENT
Start: 2025-05-30

## 2025-05-30 NOTE — TELEPHONE ENCOUNTER
Call placed to patient to discuss repeat CBC/Diff from 5/30/25. Thrombocytopenia improved. Continue steroid taper, reducing 10 mg every 2-5 days. Patient to restart eliquis 5 mg BID. Repeat CBC/Diff planned 6/9. I will f/u results and discuss next steps for treatment after repeat labs. Infusion notified to cancel 6/12 immunotherapy appt.

## 2025-05-30 NOTE — PROGRESS NOTES
Nallely Peacock  tolerated port flush with labs well with no complications.      Nallely Peacock is aware of future appt on 6/9 at 1130.     AVS printed and given to Nallely Peacock:    Yes

## 2025-05-30 NOTE — PLAN OF CARE
Problem: Potential for Falls  Goal: Patient will remain free of falls  Description: INTERVENTIONS:  - Educate patient/family on patient safety including physical limitations  - Instruct patient to call for assistance with activity   - Consider consulting OT/PT to assist with strengthening/mobility based on AM PAC & JH-HLM score    - Apply yellow socks and bracelet for high fall risk patients  - Consider moving patient to room near nurses station  Outcome: Progressing     Problem: Knowledge Deficit  Goal: Patient/family/caregiver demonstrates understanding of disease process, treatment plan, medications, and discharge instructions  Description: Complete learning assessment and assess knowledge base.  Interventions:  - Provide teaching at level of understanding  - Provide teaching via preferred learning methods  Outcome: Progressing

## 2025-06-02 ENCOUNTER — RESULTS FOLLOW-UP (OUTPATIENT)
Dept: ENDOCRINOLOGY | Facility: HOSPITAL | Age: 75
End: 2025-06-02

## 2025-06-02 NOTE — TELEPHONE ENCOUNTER
"Patient calling in regard to results and changes     States \"yeah I can't remember exactly and want to make sure I have everything right\"    Unable to reach cts team member at this time to discuss results and any changes    Please advise  "

## 2025-06-02 NOTE — TELEPHONE ENCOUNTER
Call out to patient d/t above request for call back received. Patient was asking to review providers message to ensure she heard everything correctly. I reviewed the above message per provider- patient verbalized understanding of all. Voiced no further question/concerns.

## 2025-06-09 ENCOUNTER — TELEPHONE (OUTPATIENT)
Age: 75
End: 2025-06-09

## 2025-06-09 ENCOUNTER — HOSPITAL ENCOUNTER (OUTPATIENT)
Dept: INFUSION CENTER | Facility: HOSPITAL | Age: 75
Discharge: HOME | End: 2025-06-09
Payer: MEDICARE

## 2025-06-09 DIAGNOSIS — Z45.2 ENCOUNTER FOR CENTRAL LINE CARE: Primary | ICD-10-CM

## 2025-06-09 DIAGNOSIS — C54.1 ENDOMETRIAL CANCER (HCC): ICD-10-CM

## 2025-06-09 DIAGNOSIS — R68.89 OTHER GENERAL SYMPTOMS AND SIGNS: ICD-10-CM

## 2025-06-09 DIAGNOSIS — D69.6 THROMBOCYTOPENIA (HCC): Primary | ICD-10-CM

## 2025-06-09 DIAGNOSIS — E05.90 HYPERTHYROIDISM: ICD-10-CM

## 2025-06-09 LAB
ALBUMIN SERPL BCG-MCNC: 3.9 G/DL (ref 3.5–5)
ALP SERPL-CCNC: 59 U/L (ref 34–104)
ALT SERPL W P-5'-P-CCNC: 13 U/L (ref 7–52)
AMYLASE SERPL-CCNC: 70 IU/L (ref 29–103)
ANION GAP SERPL CALCULATED.3IONS-SCNC: 8 MMOL/L (ref 4–13)
AST SERPL W P-5'-P-CCNC: 12 U/L (ref 13–39)
BASOPHILS # BLD AUTO: 0.01 THOUSANDS/ÂΜL (ref 0–0.1)
BASOPHILS NFR BLD AUTO: 0 % (ref 0–1)
BILIRUB SERPL-MCNC: 0.98 MG/DL (ref 0.2–1)
BUN SERPL-MCNC: 20 MG/DL (ref 5–25)
CALCIUM SERPL-MCNC: 9 MG/DL (ref 8.4–10.2)
CHLORIDE SERPL-SCNC: 104 MMOL/L (ref 96–108)
CO2 SERPL-SCNC: 27 MMOL/L (ref 21–32)
CREAT SERPL-MCNC: 0.82 MG/DL (ref 0.6–1.3)
EOSINOPHIL # BLD AUTO: 0.04 THOUSAND/ÂΜL (ref 0–0.61)
EOSINOPHIL NFR BLD AUTO: 1 % (ref 0–6)
ERYTHROCYTE [DISTWIDTH] IN BLOOD BY AUTOMATED COUNT: 13.6 % (ref 11.6–15.1)
GFR SERPL CREATININE-BSD FRML MDRD: 69 ML/MIN/1.73SQ M
GLUCOSE SERPL-MCNC: 91 MG/DL (ref 65–140)
HCT VFR BLD AUTO: 40.5 % (ref 34.8–46.1)
HGB BLD-MCNC: 13.2 G/DL (ref 11.5–15.4)
IMM GRANULOCYTES # BLD AUTO: 0.06 THOUSAND/UL (ref 0–0.2)
IMM GRANULOCYTES NFR BLD AUTO: 1 % (ref 0–2)
LIPASE SERPL-CCNC: 57 U/L (ref 11–82)
LYMPHOCYTES # BLD AUTO: 1.17 THOUSANDS/ÂΜL (ref 0.6–4.47)
LYMPHOCYTES NFR BLD AUTO: 17 % (ref 14–44)
MAGNESIUM SERPL-MCNC: 2.1 MG/DL (ref 1.9–2.7)
MCH RBC QN AUTO: 30.5 PG (ref 26.8–34.3)
MCHC RBC AUTO-ENTMCNC: 32.6 G/DL (ref 31.4–37.4)
MCV RBC AUTO: 94 FL (ref 82–98)
MONOCYTES # BLD AUTO: 0.61 THOUSAND/ÂΜL (ref 0.17–1.22)
MONOCYTES NFR BLD AUTO: 9 % (ref 4–12)
NEUTROPHILS # BLD AUTO: 5.14 THOUSANDS/ÂΜL (ref 1.85–7.62)
NEUTS SEG NFR BLD AUTO: 72 % (ref 43–75)
NRBC BLD AUTO-RTO: 0 /100 WBCS
PLATELET # BLD AUTO: 71 THOUSANDS/UL (ref 149–390)
PMV BLD AUTO: 9 FL (ref 8.9–12.7)
POTASSIUM SERPL-SCNC: 3.5 MMOL/L (ref 3.5–5.3)
PROT SERPL-MCNC: 6.4 G/DL (ref 6.4–8.4)
RBC # BLD AUTO: 4.33 MILLION/UL (ref 3.81–5.12)
SODIUM SERPL-SCNC: 139 MMOL/L (ref 135–147)
T3FREE SERPL-MCNC: 2.42 PG/ML (ref 2.5–3.9)
T4 FREE SERPL-MCNC: 0.94 NG/DL (ref 0.61–1.12)
TSH SERPL DL<=0.05 MIU/L-ACNC: 0.56 UIU/ML (ref 0.45–4.5)
WBC # BLD AUTO: 7.03 THOUSAND/UL (ref 4.31–10.16)

## 2025-06-09 PROCEDURE — 84443 ASSAY THYROID STIM HORMONE: CPT

## 2025-06-09 PROCEDURE — 82150 ASSAY OF AMYLASE: CPT

## 2025-06-09 PROCEDURE — 85025 COMPLETE CBC W/AUTO DIFF WBC: CPT

## 2025-06-09 PROCEDURE — 84439 ASSAY OF FREE THYROXINE: CPT

## 2025-06-09 PROCEDURE — 80053 COMPREHEN METABOLIC PANEL: CPT

## 2025-06-09 PROCEDURE — 83735 ASSAY OF MAGNESIUM: CPT

## 2025-06-09 PROCEDURE — 83690 ASSAY OF LIPASE: CPT

## 2025-06-09 PROCEDURE — 84481 FREE ASSAY (FT-3): CPT

## 2025-06-09 NOTE — PROGRESS NOTES
Nallely Peacock  tolerated treatment well with no complications.      Nallely Peacock is aware that pending today's lab results, Rosario Mares PA-C will call to schedule.     AVS printed and given to Nallely Peacock:    No (Declined by Nallely Peacock)

## 2025-06-09 NOTE — PROGRESS NOTES
E-Consult unable to be completed.  Request too complex for e-consult.  Our team will schedule patient for in-person evaluation.

## 2025-06-09 NOTE — TELEPHONE ENCOUNTER
Return call placed. Reviewed slight downtrend in plts. Continue steroid taper, patient currently at 40 mg/day taper. Plan for hematology consult. Continue to hold dostarlimab until work-up complete. Repeat labs planned 6/19/25.     Lab Results   Component Value Date    PLT 71 (L) 06/09/2025     (L) 05/30/2025    PLT 41 (L) 05/22/2025    PLT 34 (L) 04/28/2025    PLT 31 (L) 03/17/2025     02/03/2025     (L) 01/27/2025

## 2025-06-09 NOTE — TELEPHONE ENCOUNTER
Nallely calling in requesting to speak with Rosario to review lab results that were completed today. Please call her at [phone] to review. Thank you!

## 2025-06-10 ENCOUNTER — RESULTS FOLLOW-UP (OUTPATIENT)
Dept: ENDOCRINOLOGY | Facility: HOSPITAL | Age: 75
End: 2025-06-10

## 2025-06-10 ENCOUNTER — TELEPHONE (OUTPATIENT)
Age: 75
End: 2025-06-10

## 2025-06-10 NOTE — TELEPHONE ENCOUNTER
Returned call to the patient and informed her that she can finish the prednisone as prescribed until completed.  She thanked me.

## 2025-06-14 ENCOUNTER — TELEPHONE (OUTPATIENT)
Dept: OTHER | Facility: OTHER | Age: 75
End: 2025-06-14

## 2025-06-14 NOTE — TELEPHONE ENCOUNTER
Patient called to check to make sure she has the correct dose for her Eliquis medication because she should be taking 5 mg per day. Informed patient that the medication states 2.5 mg BID and that is still 5 mg per day. Patient verbalized understanding. No call back needed.

## 2025-06-17 ENCOUNTER — OFFICE VISIT (OUTPATIENT)
Dept: FAMILY MEDICINE CLINIC | Facility: HOSPITAL | Age: 75
End: 2025-06-17
Payer: MEDICARE

## 2025-06-17 ENCOUNTER — TELEPHONE (OUTPATIENT)
Age: 75
End: 2025-06-17

## 2025-06-17 VITALS
BODY MASS INDEX: 24.59 KG/M2 | DIASTOLIC BLOOD PRESSURE: 79 MMHG | HEIGHT: 64 IN | HEART RATE: 80 BPM | WEIGHT: 144 LBS | TEMPERATURE: 98.7 F | OXYGEN SATURATION: 98 % | SYSTOLIC BLOOD PRESSURE: 112 MMHG

## 2025-06-17 DIAGNOSIS — D69.6 THROMBOCYTOPENIA (HCC): ICD-10-CM

## 2025-06-17 DIAGNOSIS — E05.90 HYPERTHYROIDISM: ICD-10-CM

## 2025-06-17 DIAGNOSIS — Z00.00 MEDICARE ANNUAL WELLNESS VISIT, SUBSEQUENT: ICD-10-CM

## 2025-06-17 DIAGNOSIS — I35.0 SEVERE AORTIC STENOSIS: Primary | ICD-10-CM

## 2025-06-17 DIAGNOSIS — C54.1 ENDOMETRIAL CANCER (HCC): ICD-10-CM

## 2025-06-17 PROCEDURE — G2211 COMPLEX E/M VISIT ADD ON: HCPCS | Performed by: INTERNAL MEDICINE

## 2025-06-17 PROCEDURE — 99214 OFFICE O/P EST MOD 30 MIN: CPT | Performed by: INTERNAL MEDICINE

## 2025-06-17 PROCEDURE — G0439 PPPS, SUBSEQ VISIT: HCPCS | Performed by: INTERNAL MEDICINE

## 2025-06-17 NOTE — ASSESSMENT & PLAN NOTE
Echo results reviewed with pt, has f/u with Cardio early July, currently w/o symptoms, will follow

## 2025-06-17 NOTE — PROGRESS NOTES
Name: Nallely Pecaock      : 1950      MRN: [id number]  Encounter Provider: Swapna Colmenares DO  Encounter Date: 2025   Encounter department: [facility] [address]   :  Assessment & Plan  Severe aortic stenosis  Echo results reviewed with pt, has f/u with Cardio early July, currently w/o symptoms, will follow       Hyperthyroidism  Following with Endo, on Methimazole, most recent TSH at goal, urged to f/u with thyroid US - order reprinted and given to pt, will follow along with Endo       Endometrial cancer (HCC)  Following with Dr. 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 They screened negative with a PHQ-2 score of 0.        Preventive health issues were discussed with patient, and age appropriate screening tests were ordered as noted in patient's After Visit Summary. Personalized health advice and appropriate referrals for health education or preventive services given if needed, as noted in patient's After Visit Summary.      Colonoscopy no longer needed d/t age    Mammo refused    Dexa refused    Thyroid US ordered at last visit but not yet done    Echo     BW   FLP       History of Present Illness     HPI  Pt here for follow up appt and BW results    Pt had her Echo done since our last visit and results were d/w pt in detail: EF 55-60% and grade 1 diastolic dysfunction was noted.  Mod to severe AS was noted. Pt was notified of results and referred to Cardio. She has appt with Dr. Spring on 25.   Pt notes no CP/SOB/syncope.      Pt saw Endo (Bob Renteria) in May for f/u hyperthyroidism - OV note reviewed.  She remains on Methimazole daily.  TSH 6/9/25 was 0.565. She has not done her thyroid US yet for f/u thyroid nodule.     Pt saw Gyn Onc (Dr. Jacome) in May for f/u endometrial CA - OV notes reviewed. She was given a steroid taper to see if it helped her plt count. Her Eliquis for DVT of LLE was decreased d/t her plt count.  Counts came up with steroids and her Eliquis was increased back to 5 mg bid. Last plt coutn 6.9/25 was 71.      Patient Care Team:  Swapna Colmenares DO as PCP - General (Internal Medicine)  Maira Rob MD as PCP - Endocrinology (Endocrinology)  Solange Austin (Oncology)  Lavon Jacome MD (Gynecologic Oncology)  Rosario Mares PA-C (Gynecologic Oncology)  Cameron Renteria PA-C (Endocrinology)  Bridgette Deleon as  (Oncology)    Review of Systems   Constitutional:  Negative for chills and fever.   HENT:  Negative for congestion, trouble swallowing and voice change.    Eyes:  Positive for visual disturbance. Negative for pain.   Respiratory:  Negative for cough, shortness of breath and wheezing.    Cardiovascular:  Negative for chest pain and palpitations.   Gastrointestinal:  Negative for abdominal pain, blood in stool, constipation, diarrhea, nausea and vomiting.   Genitourinary:  Negative for difficulty urinating, dysuria and hematuria.   Musculoskeletal:  Negative for back pain and gait problem.   Skin:  Negative for rash and wound.   Neurological:  Negative for dizziness, syncope, light-headedness and headaches.   Hematological:  Does not bruise/bleed easily.   Psychiatric/Behavioral:  Negative for confusion.      Medical History Reviewed by provider this encounter:  Tobacco  Allergies  Meds  Problems  Med Hx  Surg Hx  Fam Hx       Annual Wellness Visit Questionnaire   Nallely is here for her Subsequent Wellness visit. Last Medicare  Wellness visit information reviewed, patient interviewed and updates made to the record today.      Health Risk Assessment:   Patient rates overall health as very good. Patient feels that their physical health rating is slightly better. Patient is satisfied with their life. Eyesight was rated as slightly worse. Hearing was rated as same. Patient feels that their emotional and mental health rating is same. Patients states they are never, rarely angry. Patient states they are sometimes unusually tired/fatigued. Pain experienced in the last 7 days has been none. Patient states that she has experienced no weight loss or gain in last 6 months. Eyesight worse - wears readers and has not seen eye doc in some time    Depression Screening:   PHQ-2 Score: 0      Fall Risk Screening:   In the past year, patient has experienced: no history of falling in past year      Urinary Incontinence Screening:   Patient has not leaked urine accidently in the last six months.     Home Safety:  Patient does not have trouble with stairs inside or outside of their home. Patient has working smoke alarms and has working carbon monoxide detector. Home safety hazards include: none.     Nutrition:   Current diet is Regular.     Medications:   Patient is not currently taking any over-the-counter supplements. Patient is able to manage medications.     Activities of Daily Living (ADLs)/Instrumental Activities of Daily Living (IADLs):   Walk and transfer into and out of bed and chair?: Yes  Dress and groom yourself?: Yes    Bathe or shower yourself?: Yes    Feed yourself? Yes  Do your laundry/housekeeping?: Yes  Manage your money, pay your bills and track your expenses?: Yes  Make your own meals?: Yes    Do your own shopping?: Yes    Previous Hospitalizations:   Any hospitalizations or ED visits within the last 12 months?: Yes    How many hospitalizations have you had in the last year?: 1-2    Advance Care Planning:   Living will: Yes    Durable POA  for healthcare: Yes    Advanced directive: Yes      Cognitive Screening:   Provider or family/friend/caregiver concerned regarding cognition?: No    Preventive Screenings      Cardiovascular Screening:    General: History Lipid Disorder, Screening Current and Risks and Benefits Discussed      Diabetes Screening:     General: Screening Current and Risks and Benefits Discussed      Colorectal Cancer Screening:     General: Risks and Benefits Discussed and Screening Not Indicated      Breast Cancer Screening:     General: Risks and Benefits Discussed and Patient Declines      Cervical Cancer Screening:    General: Screening Not Indicated and Risks and Benefits Discussed      Osteoporosis Screening:    General: Risks and Benefits Discussed and Patient Declines      Abdominal Aortic Aneurysm (AAA) Screening:        General: Risks and Benefits Discussed and Screening Not Indicated      Lung Cancer Screening:     General: Screening Not Indicated and Risks and Benefits Discussed      Hepatitis C Screening:    General: Risks and Benefits Discussed and Patient Declines    Immunizations:  - Immunizations due: Prevnar 20 and Zoster (Shingrix)    Screening, Brief Intervention, and Referral to Treatment (SBIRT)     Screening  Typical number of drinks in a day: 0  Typical number of drinks in a week: 0  Interpretation: Low risk drinking behavior.    Single Item Drug Screening:  How often have you used an illegal drug (including marijuana) or a prescription medication for non-medical reasons in the past year? never    Single Item Drug Screen Score: 0  Interpretation: Negative screen for possible drug use disorder    Other Counseling Topics:   Regular weightbearing exercise.     Social Drivers of Health     Financial Resource Strain: Medium Risk (5/23/2023)    Overall Financial Resource Strain (CARDIA)     Difficulty of Paying Living Expenses: Somewhat hard   Food Insecurity: Patient Declined (6/17/2025)    Nursing - Inadequate Food  "Risk Classification     Worried About Running Out of Food in the Last Year: Never true     Ran Out of Food in the Last Year: Never true     Ran Out of Food in the Last Year: Patient declined   Transportation Needs: No Transportation Needs (6/17/2025)    PRAPARE - Transportation     Lack of Transportation (Medical): No     Lack of Transportation (Non-Medical): No   Housing Stability: Low Risk  (6/17/2025)    Housing Stability Vital Sign     Unable to Pay for Housing in the Last Year: No     Number of Times Moved in the Last Year: 0     Homeless in the Last Year: No   Utilities: Not At Risk (6/17/2025)    [facility] Utilities     Threatened with loss of utilities: No     Vision Screening    Right eye Left eye Both eyes   Without correction 20/50 20/70 20/50   With correction          Objective   /79 (BP Location: Left arm, Patient Position: Sitting, Cuff Size: Standard)   Pulse 80   Temp 98.7 °F (37.1 °C)   Ht 5' 4.02\" (1.626 m)   Wt 65.3 kg (144 lb)   SpO2 98%   BMI 24.70 kg/m²     Physical Exam  Vitals and nursing note reviewed.   Constitutional:       General: She is not in acute distress.     Appearance: She is well-developed. She is not ill-appearing.   HENT:      Head: Normocephalic and atraumatic.      Right Ear: Tympanic membrane and external ear normal. There is no impacted cerumen.      Left Ear: Tympanic membrane and external ear normal. There is no impacted cerumen.      Mouth/Throat:      Mouth: Mucous membranes are moist.      Pharynx: Oropharynx is clear. No oropharyngeal exudate.     Eyes:      General:         Right eye: No discharge.         Left eye: No discharge.      Conjunctiva/sclera: Conjunctivae normal.     Neck:      Thyroid: No thyromegaly.      Vascular: No carotid bruit.      Trachea: No tracheal deviation.     Cardiovascular:      Rate and Rhythm: Normal rate and regular rhythm.      Heart sounds: Murmur heard.   Pulmonary:      Effort: Pulmonary effort is normal. No respiratory " distress.      Breath sounds: Normal breath sounds. No wheezing, rhonchi or rales.   Abdominal:      General: There is no distension.      Palpations: Abdomen is soft.      Tenderness: There is no abdominal tenderness. There is no guarding or rebound.     Musculoskeletal:         General: No deformity or signs of injury.      Cervical back: Neck supple.   Lymphadenopathy:      Cervical: No cervical adenopathy.     Skin:     General: Skin is warm and dry.      Coloration: Skin is not pale.      Findings: No rash.     Neurological:      General: No focal deficit present.      Mental Status: She is alert. Mental status is at baseline.      Motor: No abnormal muscle tone.      Gait: Gait abnormal.      Comments: Ambulates with roller walker   Psychiatric:         Mood and Affect: Mood normal.         Behavior: Behavior normal.         Thought Content: Thought content normal.         Judgment: Judgment normal.

## 2025-06-17 NOTE — ASSESSMENT & PLAN NOTE
Seemingly responded to Prednisone, con't taper as per Gyn Onc, was referred to Heme/Onc for eval of this as well, will follow

## 2025-06-17 NOTE — TELEPHONE ENCOUNTER
Called and left a voicemail for patient to reschedule her hem onc appointment for sooner with Miguelina. Appointment is being held on 6/20 at 10:00 am in . [place] number was left for call back if patient would like to take sooner appointment.

## 2025-06-17 NOTE — PATIENT INSTRUCTIONS
Medicare Preventive Visit Patient Instructions  Thank you for completing your Welcome to Medicare Visit or Medicare Annual Wellness Visit today. Your next wellness visit will be due in one year (6/18/2026).  The screening/preventive services that you may require over the next 5-10 years are detailed below. Some tests may not apply to you based off risk factors and/or age. Screening tests ordered at today's visit but not completed yet may show as past due. Also, please note that scanned in results may not display below.  Preventive Screenings:  Service Recommendations Previous Testing/Comments   Colorectal Cancer Screening  * Colonoscopy    * Fecal Occult Blood Test (FOBT)/Fecal Immunochemical Test (FIT)  * Fecal DNA/Cologuard Test  * Flexible Sigmoidoscopy Age: 45-75 years old   Colonoscopy: every 10 years (may be performed more frequently if at higher risk)  OR  FOBT/FIT: every 1 year  OR  Cologuard: every 3 years  OR  Sigmoidoscopy: every 5 years  Screening may be recommended earlier than age 45 if at higher risk for colorectal cancer. Also, an individualized decision between you and your healthcare provider will decide whether screening between the ages of 76-85 would be appropriate. Colonoscopy: Not on file  FOBT/FIT: Not on file  Cologuard: Not on file  Sigmoidoscopy: Not on file          Breast Cancer Screening Age: 40+ years old  Frequency: every 1-2 years  Not required if history of left and right mastectomy Mammogram: Not on file        Cervical Cancer Screening Between the ages of 21-29, pap smear recommended once every 3 years.   Between the ages of 30-65, can perform pap smear with HPV co-testing every 5 years.   Recommendations may differ for women with a history of total hysterectomy, cervical cancer, or abnormal pap smears in past. Pap Smear: Not on file    Screening Not Indicated   Hepatitis C Screening Once for adults born between 1945 and 1965  More frequently in patients at high risk for Hepatitis  C Hep C Antibody: Not on file        Diabetes Screening 1-2 times per year if you're at risk for diabetes or have pre-diabetes Fasting glucose: 83 mg/dL (12/2/2024)  A1C: 5.4 % (10/22/2024)  Screening Current   Cholesterol Screening Once every 5 years if you don't have a lipid disorder. May order more often based on risk factors. Lipid panel: 05/31/2024    Screening Not Indicated  History Lipid Disorder     Other Preventive Screenings Covered by Medicare:  Abdominal Aortic Aneurysm (AAA) Screening: covered once if your at risk. You're considered to be at risk if you have a family history of AAA.  Lung Cancer Screening: covers low dose CT scan once per year if you meet all of the following conditions: (1) Age 55-77; (2) No signs or symptoms of lung cancer; (3) Current smoker or have quit smoking within the last 15 years; (4) You have a tobacco smoking history of at least 20 pack years (packs per day multiplied by number of years you smoked); (5) You get a written order from a healthcare provider.  Glaucoma Screening: covered annually if you're considered high risk: (1) You have diabetes OR (2) Family history of glaucoma OR (3)  aged 50 and older OR (4)  American aged 65 and older  Osteoporosis Screening: covered every 2 years if you meet one of the following conditions: (1) You're estrogen deficient and at risk for osteoporosis based off medical history and other findings; (2) Have a vertebral abnormality; (3) On glucocorticoid therapy for more than 3 months; (4) Have primary hyperparathyroidism; (5) On osteoporosis medications and need to assess response to drug therapy.   Last bone density test (DXA Scan): Not on file.  HIV Screening: covered annually if you're between the age of 15-65. Also covered annually if you are younger than 15 and older than 65 with risk factors for HIV infection. For pregnant patients, it is covered up to 3 times per pregnancy.    Immunizations:  Immunization  Recommendations   Influenza Vaccine Annual influenza vaccination during flu season is recommended for all persons aged >= 6 months who do not have contraindications   Pneumococcal Vaccine   * Pneumococcal conjugate vaccine = PCV13 (Prevnar 13), PCV15 (Vaxneuvance), PCV20 (Prevnar 20)  * Pneumococcal polysaccharide vaccine = PPSV23 (Pneumovax) Adults 19-63 yo with certain risk factors or if 65+ yo  If never received any pneumonia vaccine: recommend Prevnar 20 (PCV20)  Give PCV20 if previously received 1 dose of PCV13 or PPSV23   Hepatitis B Vaccine 3 dose series if at intermediate or high risk (ex: diabetes, end stage renal disease, liver disease)   Respiratory syncytial virus (RSV) Vaccine - COVERED BY MEDICARE PART D  * RSVPreF3 (Arexvy) CDC recommends that adults 60 years of age and older may receive a single dose of RSV vaccine using shared clinical decision-making (SCDM)   Tetanus (Td) Vaccine - COST NOT COVERED BY MEDICARE PART B Following completion of primary series, a booster dose should be given every 10 years to maintain immunity against tetanus. Td may also be given as tetanus wound prophylaxis.   Tdap Vaccine - COST NOT COVERED BY MEDICARE PART B Recommended at least once for all adults. For pregnant patients, recommended with each pregnancy.   Shingles Vaccine (Shingrix) - COST NOT COVERED BY MEDICARE PART B  2 shot series recommended in those 19 years and older who have or will have weakened immune systems or those 50 years and older     Health Maintenance Due:      Topic Date Due   • Hepatitis C Screening  Never done     Immunizations Due:      Topic Date Due   • Pneumococcal Vaccine: 50+ Years (1 of 2 - PCV) Never done   • COVID-19 Vaccine (3 - Pfizer risk series) 11/10/2021   • Influenza Vaccine (Season Ended) 09/01/2025     Advance Directives   What are advance directives?  Advance directives are legal documents that state your wishes and plans for medical care. These plans are made ahead of time  in case you lose your ability to make decisions for yourself. Advance directives can apply to any medical decision, such as the treatments you want, and if you want to donate organs.   What are the types of advance directives?  There are many types of advance directives, and each state has rules about how to use them. You may choose a combination of any of the following:  Living will:  This is a written record of the treatment you want. You can also choose which treatments you do not want, which to limit, and which to stop at a certain time. This includes surgery, medicine, IV fluid, and tube feedings.   Durable power of  for healthcare (DPAHC):  This is a written record that states who you want to make healthcare choices for you when you are unable to make them for yourself. This person, called a proxy, is usually a family member or a friend. You may choose more than 1 proxy.  Do not resuscitate (DNR) order:  A DNR order is used in case your heart stops beating or you stop breathing. It is a request not to have certain forms of treatment, such as CPR. A DNR order may be included in other types of advance directives.  Medical directive:  This covers the care that you want if you are in a coma, near death, or unable to make decisions for yourself. You can list the treatments you want for each condition. Treatment may include pain medicine, surgery, blood transfusions, dialysis, IV or tube feedings, and a ventilator (breathing machine).  Values history:  This document has questions about your views, beliefs, and how you feel and think about life. This information can help others choose the care that you would choose.  Why are advance directives important?  An advance directive helps you control your care. Although spoken wishes may be used, it is better to have your wishes written down. Spoken wishes can be misunderstood, or not followed. Treatments may be given even if you do not want them. An advance  directive may make it easier for your family to make difficult choices about your care.       © Copyright [employer] 2018 Information is for End User's use only and may not be sold, redistributed or otherwise used for commercial purposes. All illustrations and images included in CareNotes® are the copyrighted property of A.D.A.M., Inc. or [employer]

## 2025-06-17 NOTE — ASSESSMENT & PLAN NOTE
Following with Endo, on Methimazole, most recent TSH at goal, urged to f/u with thyroid US - order reprinted and given to pt, will follow along with Endo

## 2025-06-17 NOTE — ASSESSMENT & PLAN NOTE
Following with Dr. 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

## 2025-06-18 ENCOUNTER — TELEPHONE (OUTPATIENT)
Dept: GYNECOLOGIC ONCOLOGY | Facility: CLINIC | Age: 75
End: 2025-06-18

## 2025-06-18 ENCOUNTER — TELEPHONE (OUTPATIENT)
Age: 75
End: 2025-06-18

## 2025-06-18 NOTE — TELEPHONE ENCOUNTER
Call placed to patient. Confirmed therapeutic Eliquis dosing (5mg PO BID). She states she recently picked up a 2.5 mg rx - discussed with Dr. Jacome and this was from when her platelets were very low. Confirmed with her that she can stay on the 5mg BID dosing with current platelet count 71,000 as of 6/9/25.

## 2025-06-18 NOTE — TELEPHONE ENCOUNTER
Called and spoke to patient and offered a sooner appt to see Miguelina on 6/20 instead of 8/7 .  Patient declined due to having another appt and would like to keep 8/7 appt.

## 2025-06-19 ENCOUNTER — HOSPITAL ENCOUNTER (OUTPATIENT)
Dept: INFUSION CENTER | Facility: HOSPITAL | Age: 75
Discharge: HOME | End: 2025-06-19
Payer: MEDICARE

## 2025-06-19 DIAGNOSIS — D69.6 THROMBOCYTOPENIA (HCC): ICD-10-CM

## 2025-06-19 DIAGNOSIS — C54.1 ENDOMETRIAL CANCER (HCC): ICD-10-CM

## 2025-06-19 DIAGNOSIS — C54.1 ENDOMETRIAL CANCER (HCC): Primary | ICD-10-CM

## 2025-06-19 DIAGNOSIS — Z45.2 ENCOUNTER FOR CENTRAL LINE CARE: Primary | ICD-10-CM

## 2025-06-19 LAB
BASOPHILS # BLD AUTO: 0.01 THOUSANDS/ÂΜL (ref 0–0.1)
BASOPHILS NFR BLD AUTO: 0 % (ref 0–1)
EOSINOPHIL # BLD AUTO: 0.06 THOUSAND/ÂΜL (ref 0–0.61)
EOSINOPHIL NFR BLD AUTO: 1 % (ref 0–6)
ERYTHROCYTE [DISTWIDTH] IN BLOOD BY AUTOMATED COUNT: 14.8 % (ref 11.6–15.1)
HCT VFR BLD AUTO: 39.2 % (ref 34.8–46.1)
HGB BLD-MCNC: 12.8 G/DL (ref 11.5–15.4)
IMM GRANULOCYTES # BLD AUTO: 0.03 THOUSAND/UL (ref 0–0.2)
IMM GRANULOCYTES NFR BLD AUTO: 1 % (ref 0–2)
LYMPHOCYTES # BLD AUTO: 1.3 THOUSANDS/ÂΜL (ref 0.6–4.47)
LYMPHOCYTES NFR BLD AUTO: 23 % (ref 14–44)
MCH RBC QN AUTO: 30.8 PG (ref 26.8–34.3)
MCHC RBC AUTO-ENTMCNC: 32.7 G/DL (ref 31.4–37.4)
MCV RBC AUTO: 94 FL (ref 82–98)
MONOCYTES # BLD AUTO: 0.54 THOUSAND/ÂΜL (ref 0.17–1.22)
MONOCYTES NFR BLD AUTO: 9 % (ref 4–12)
NEUTROPHILS # BLD AUTO: 3.85 THOUSANDS/ÂΜL (ref 1.85–7.62)
NEUTS SEG NFR BLD AUTO: 66 % (ref 43–75)
NRBC BLD AUTO-RTO: 0 /100 WBCS
PLATELET # BLD AUTO: 90 THOUSANDS/UL (ref 149–390)
PMV BLD AUTO: 9.4 FL (ref 8.9–12.7)
RBC # BLD AUTO: 4.16 MILLION/UL (ref 3.81–5.12)
WBC # BLD AUTO: 5.79 THOUSAND/UL (ref 4.31–10.16)

## 2025-06-19 PROCEDURE — 85025 COMPLETE CBC W/AUTO DIFF WBC: CPT

## 2025-06-19 NOTE — PROGRESS NOTES
Nallely Peacock  tolerated treatment well with no complications.      Nallely Peacock is aware of future appt on 7/21 at 1230.     AVS printed and given to Nallely Peacock:  no (Declined by Nallely Peacock)

## 2025-06-20 ENCOUNTER — RESULTS FOLLOW-UP (OUTPATIENT)
Dept: GYNECOLOGIC ONCOLOGY | Facility: CLINIC | Age: 75
End: 2025-06-20

## 2025-06-20 NOTE — TELEPHONE ENCOUNTER
I spoke with the patient and informed her that she will need to repeat the CBC blood work in 2 weeks between 6/30/2025 and 7/4/2025. I also informed the patient that Dr. Jacome wants to see her in the [facility] office on 8/7/2025 at 11:00AM for a follow up appointment after her consult appointment with Hematology Oncology at 10:00AM that day. The patient voiced her understanding.

## 2025-06-23 DIAGNOSIS — D69.6 THROMBOCYTOPENIA (HCC): Primary | ICD-10-CM

## 2025-06-27 DIAGNOSIS — C54.1 ENDOMETRIAL CANCER (HCC): ICD-10-CM

## 2025-06-30 ENCOUNTER — HOSPITAL ENCOUNTER (OUTPATIENT)
Dept: INFUSION CENTER | Facility: HOSPITAL | Age: 75
Discharge: HOME | End: 2025-06-30
Payer: MEDICARE

## 2025-06-30 DIAGNOSIS — D69.6 THROMBOCYTOPENIA (HCC): ICD-10-CM

## 2025-06-30 DIAGNOSIS — Z45.2 ENCOUNTER FOR CENTRAL LINE CARE: Primary | ICD-10-CM

## 2025-06-30 DIAGNOSIS — C54.1 ENDOMETRIAL CANCER (HCC): ICD-10-CM

## 2025-06-30 LAB
BASOPHILS # BLD AUTO: 0.03 THOUSANDS/ÂΜL (ref 0–0.1)
BASOPHILS NFR BLD AUTO: 1 % (ref 0–1)
EOSINOPHIL # BLD AUTO: 0.04 THOUSAND/ÂΜL (ref 0–0.61)
EOSINOPHIL NFR BLD AUTO: 1 % (ref 0–6)
ERYTHROCYTE [DISTWIDTH] IN BLOOD BY AUTOMATED COUNT: 14.8 % (ref 11.6–15.1)
HCT VFR BLD AUTO: 38.4 % (ref 34.8–46.1)
HGB BLD-MCNC: 12.7 G/DL (ref 11.5–15.4)
IMM GRANULOCYTES # BLD AUTO: 0.03 THOUSAND/UL (ref 0–0.2)
IMM GRANULOCYTES NFR BLD AUTO: 1 % (ref 0–2)
LYMPHOCYTES # BLD AUTO: 0.85 THOUSANDS/ÂΜL (ref 0.6–4.47)
LYMPHOCYTES NFR BLD AUTO: 23 % (ref 14–44)
MCH RBC QN AUTO: 31.4 PG (ref 26.8–34.3)
MCHC RBC AUTO-ENTMCNC: 33.1 G/DL (ref 31.4–37.4)
MCV RBC AUTO: 95 FL (ref 82–98)
MONOCYTES # BLD AUTO: 0.27 THOUSAND/ÂΜL (ref 0.17–1.22)
MONOCYTES NFR BLD AUTO: 7 % (ref 4–12)
NEUTROPHILS # BLD AUTO: 2.51 THOUSANDS/ÂΜL (ref 1.85–7.62)
NEUTS SEG NFR BLD AUTO: 67 % (ref 43–75)
NRBC BLD AUTO-RTO: 0 /100 WBCS
PLATELET # BLD AUTO: 93 THOUSANDS/UL (ref 149–390)
PMV BLD AUTO: 8.8 FL (ref 8.9–12.7)
RBC # BLD AUTO: 4.05 MILLION/UL (ref 3.81–5.12)
WBC # BLD AUTO: 3.73 THOUSAND/UL (ref 4.31–10.16)

## 2025-06-30 PROCEDURE — 85025 COMPLETE CBC W/AUTO DIFF WBC: CPT

## 2025-06-30 NOTE — PROGRESS NOTES
Nallely Peacock  tolerated treatment well with no complications.      Nallely Peacock is aware of future appt on 7/21 at 12:30.     AVS printed and given to Nallely Peacock:  No (Declined by Nallely Peacock)

## 2025-07-01 ENCOUNTER — TELEPHONE (OUTPATIENT)
Age: 75
End: 2025-07-01

## 2025-07-01 DIAGNOSIS — C54.1 ENDOMETRIAL CANCER (HCC): ICD-10-CM

## 2025-07-01 DIAGNOSIS — D69.6 THROMBOCYTOPENIA (HCC): Primary | ICD-10-CM

## 2025-07-01 RX ORDER — APIXABAN 2.5 MG/1
2.5 TABLET, FILM COATED ORAL 2 TIMES DAILY
Qty: 42 TABLET | Refills: 1 | OUTPATIENT
Start: 2025-07-01

## 2025-07-01 NOTE — TELEPHONE ENCOUNTER
Patient calling in to ask to reports she finished taking the prednisone last Thursday and forgot to inform Rosario Mares PA-C.  I thanked her for the update and will relay this to her care team.  She thanked me.

## 2025-07-02 ENCOUNTER — OFFICE VISIT (OUTPATIENT)
Dept: CARDIOLOGY CLINIC | Facility: CLINIC | Age: 75
End: 2025-07-02
Payer: MEDICARE

## 2025-07-02 VITALS
SYSTOLIC BLOOD PRESSURE: 128 MMHG | WEIGHT: 147 LBS | HEART RATE: 95 BPM | HEIGHT: 64 IN | BODY MASS INDEX: 25.1 KG/M2 | DIASTOLIC BLOOD PRESSURE: 70 MMHG

## 2025-07-02 DIAGNOSIS — E78.2 MIXED HYPERLIPIDEMIA: ICD-10-CM

## 2025-07-02 DIAGNOSIS — I10 ESSENTIAL (PRIMARY) HYPERTENSION: ICD-10-CM

## 2025-07-02 DIAGNOSIS — I35.0 MODERATE AORTIC STENOSIS: ICD-10-CM

## 2025-07-02 DIAGNOSIS — I35.0 SEVERE AORTIC STENOSIS: Primary | ICD-10-CM

## 2025-07-02 PROCEDURE — 99203 OFFICE O/P NEW LOW 30 MIN: CPT | Performed by: INTERNAL MEDICINE

## 2025-07-02 NOTE — PROGRESS NOTES
Cardiology Follow Up    Nallely Peacock  1950  [id number]  [facility]  [address] [facility] [address]  [phone]    1. Severe aortic stenosis        2. Essential (primary) hypertension        3. Mixed hyperlipidemia        4. Moderate aortic stenosis  Ambulatory Referral to Cardiology          Interval History: Cardiology consultation.  Records reviewed, imaging was reviewed as well.  75-year-old female who comes relation of her aortic stenosis.  The patient had an echocardiogram in 2023, that time left systolic function was normal with normal diastolic parameters for age.  She did have aortic stenosis, mean gradient 22 mmHg.  There was mild tricuspid insufficiency with mild increase pulmonary pressures as suggested by Doppler criteria, echocardiogram 2025, personally reviewed, with normal left ventricular systolic function with mild left hypertrophy and stage I diastolic dysfunction.  Aortic stenosis described as moderate to severe, mean gradient is minimally changed 23 minutes of mercury, I do not think is that severe.  Ascending aorta ectatic at 40 mm.  The patient is currently having no symptoms including chest pain or dyspnea.  She describes herself as being very active.  No exertional symptoms.  Denies syncope or presyncope.  No orthopnea no PND.    She is currently on full anticoagulation with factor Xa inhibitor for history of DVT of the left femoral vein..  And she is undergoing treatment for endometrial CA with immunotherapy, she did undergo debulking, followed by adjuvant chemotherapy.  Patient does have dyslipidemia on low intensity statin therapy, lipids last checked total cholesterol 145, HDL 63, LDL 61, normal creatinine of 0.8.  CT scan of the chest, pressure reviewed revealed extensive coronary calcifications.  Ascending aorta described as normal.    Problem List[1]  Past Medical  History[2]  Social History     Socioeconomic History    Marital status: /Civil Union     Spouse name: Not on file    Number of children: Not on file    Years of education: Not on file    Highest education level: Not on file   Occupational History    Not on file   Tobacco Use    Smoking status: Former     Current packs/day: 0.00     Types: Cigarettes     Quit date:      Years since quittin.5     Passive exposure: Never    Smokeless tobacco: Never   Vaping Use    Vaping status: Never Used   Substance and Sexual Activity    Alcohol use: Not Currently    Drug use: Not Currently    Sexual activity: Not on file   Other Topics Concern    Not on file   Social History Narrative    Not on file     Social Drivers of Health     Financial Resource Strain: Medium Risk (2023)    Overall Financial Resource Strain (CARDIA)     Difficulty of Paying Living Expenses: Somewhat hard   Food Insecurity: Patient Declined (2025)    Nursing - Inadequate Food Risk Classification     Worried About Running Out of Food in the Last Year: Never true     Ran Out of Food in the Last Year: Never true     Ran Out of Food in the Last Year: Patient declined   Transportation Needs: No Transportation Needs (2025)    PRAPARE - Transportation     Lack of Transportation (Medical): No     Lack of Transportation (Non-Medical): No   Physical Activity: Not on file   Stress: Not on file   Social Connections: Not on file   Intimate Partner Violence: Patient Declined (10/29/2024)    Nursing IPS     Feels Physically and Emotionally Safe: Not on file     Physically Hurt by Someone: Not on file     Humiliated or Emotionally Abused by Someone: Not on file     Physically Hurt by Someone: Patient declined     Hurt or Threatened by Someone: Patient declined   Housing Stability: Low Risk  (2025)    Housing Stability Vital Sign     Unable to Pay for Housing in the Last Year: No     Number of Times Moved in the Last Year: 0     Homeless in  the Last Year: No      Family History[3]  Past Surgical History[4]  Current Medications[5]  Allergies   Allergen Reactions    Citrus - Food Allergy Hives    Codeine Hives    Strawberry (Diagnostic) - Food Allergy Hives    Penicillin G Rash       Labs:  Hospital Outpatient Visit on 06/30/2025   Component Date Value    WBC 06/30/2025 3.73 (L)     RBC 06/30/2025 4.05     Hemoglobin 06/30/2025 12.7     Hematocrit 06/30/2025 38.4     MCV 06/30/2025 95     MCH 06/30/2025 31.4     MCHC 06/30/2025 33.1     RDW 06/30/2025 14.8     MPV 06/30/2025 8.8 (L)     Platelets 06/30/2025 93 (L)     nRBC 06/30/2025 0     Segmented % 06/30/2025 67     Immature Grans % 06/30/2025 1     Lymphocytes % 06/30/2025 23     Monocytes % 06/30/2025 7     Eosinophils Relative 06/30/2025 1     Basophils Relative 06/30/2025 1     Absolute Neutrophils 06/30/2025 2.51     Absolute Immature Grans 06/30/2025 0.03     Absolute Lymphocytes 06/30/2025 0.85     Absolute Monocytes 06/30/2025 0.27     Eosinophils Absolute 06/30/2025 0.04     Basophils Absolute 06/30/2025 0.03    Hospital Outpatient Visit on 06/19/2025   Component Date Value    WBC 06/19/2025 5.79     RBC 06/19/2025 4.16     Hemoglobin 06/19/2025 12.8     Hematocrit 06/19/2025 39.2     MCV 06/19/2025 94     MCH 06/19/2025 30.8     MCHC 06/19/2025 32.7     RDW 06/19/2025 14.8     MPV 06/19/2025 9.4     Platelets 06/19/2025 90 (L)     nRBC 06/19/2025 0     Segmented % 06/19/2025 66     Immature Grans % 06/19/2025 1     Lymphocytes % 06/19/2025 23     Monocytes % 06/19/2025 9     Eosinophils Relative 06/19/2025 1     Basophils Relative 06/19/2025 0     Absolute Neutrophils 06/19/2025 3.85     Absolute Immature Grans 06/19/2025 0.03     Absolute Lymphocytes 06/19/2025 1.30     Absolute Monocytes 06/19/2025 0.54     Eosinophils Absolute 06/19/2025 0.06     Basophils Absolute 06/19/2025 0.01    Hospital Outpatient Visit on 06/09/2025   Component Date Value    WBC 06/09/2025 7.03     RBC 06/09/2025  4.33     Hemoglobin 06/09/2025 13.2     Hematocrit 06/09/2025 40.5     MCV 06/09/2025 94     MCH 06/09/2025 30.5     MCHC 06/09/2025 32.6     RDW 06/09/2025 13.6     MPV 06/09/2025 9.0     Platelets 06/09/2025 71 (L)     nRBC 06/09/2025 0     Segmented % 06/09/2025 72     Immature Grans % 06/09/2025 1     Lymphocytes % 06/09/2025 17     Monocytes % 06/09/2025 9     Eosinophils Relative 06/09/2025 1     Basophils Relative 06/09/2025 0     Absolute Neutrophils 06/09/2025 5.14     Absolute Immature Grans 06/09/2025 0.06     Absolute Lymphocytes 06/09/2025 1.17     Absolute Monocytes 06/09/2025 0.61     Eosinophils Absolute 06/09/2025 0.04     Basophils Absolute 06/09/2025 0.01     Sodium 06/09/2025 139     Potassium 06/09/2025 3.5     Chloride 06/09/2025 104     CO2 06/09/2025 27     ANION GAP 06/09/2025 8     BUN 06/09/2025 20     Creatinine 06/09/2025 0.82     Glucose 06/09/2025 91     Calcium 06/09/2025 9.0     AST 06/09/2025 12 (L)     ALT 06/09/2025 13     Alkaline Phosphatase 06/09/2025 59     Total Protein 06/09/2025 6.4     Albumin 06/09/2025 3.9     Total Bilirubin 06/09/2025 0.98     eGFR 06/09/2025 69     Magnesium 06/09/2025 2.1     TSH 3RD GENERATION 06/09/2025 0.565     Amylase 06/09/2025 70     Lipase 06/09/2025 57     Free T4 06/09/2025 0.94     T3, Free 06/09/2025 2.42 (L)    Hospital Outpatient Visit on 05/30/2025   Component Date Value    T3, Free 05/30/2025 2.53     WBC 05/30/2025 6.16     RBC 05/30/2025 4.25     Hemoglobin 05/30/2025 13.2     Hematocrit 05/30/2025 40.3     MCV 05/30/2025 95     MCH 05/30/2025 31.1     MCHC 05/30/2025 32.8     RDW 05/30/2025 12.9     MPV 05/30/2025 9.6     Platelets 05/30/2025 106 (L)     nRBC 05/30/2025 0     Segmented % 05/30/2025 66     Immature Grans % 05/30/2025 1     Lymphocytes % 05/30/2025 21     Monocytes % 05/30/2025 12     Eosinophils Relative 05/30/2025 0     Basophils Relative 05/30/2025 0     Absolute Neutrophils 05/30/2025 4.07     Absolute Immature  Grans 05/30/2025 0.04     Absolute Lymphocytes 05/30/2025 1.29     Absolute Monocytes 05/30/2025 0.73     Eosinophils Absolute 05/30/2025 0.02     Basophils Absolute 05/30/2025 0.01     Sodium 05/30/2025 140     Potassium 05/30/2025 3.8     Chloride 05/30/2025 104     CO2 05/30/2025 29     ANION GAP 05/30/2025 7     BUN 05/30/2025 19     Creatinine 05/30/2025 0.94     Glucose 05/30/2025 85     Calcium 05/30/2025 9.3     AST 05/30/2025 12 (L)     ALT 05/30/2025 12     Alkaline Phosphatase 05/30/2025 68     Total Protein 05/30/2025 6.7     Albumin 05/30/2025 3.9     Total Bilirubin 05/30/2025 0.57     eGFR 05/30/2025 58     Magnesium 05/30/2025 2.2     TSH 3RD GENERATION 05/30/2025 1.410     Amylase 05/30/2025 59     Lipase 05/30/2025 55      05/30/2025 5.1    Appointment on 05/22/2025   Component Date Value    WBC 05/22/2025 3.98 (L)     RBC 05/22/2025 4.45     Hemoglobin 05/22/2025 13.7     Hematocrit 05/22/2025 43.8     MCV 05/22/2025 98     MCH 05/22/2025 30.8     MCHC 05/22/2025 31.3 (L)     RDW 05/22/2025 12.3     MPV 05/22/2025 10.4     Platelets 05/22/2025 41 (L)     nRBC 05/22/2025 0     Segmented % 05/22/2025 69     Immature Grans % 05/22/2025 1     Lymphocytes % 05/22/2025 19     Monocytes % 05/22/2025 8     Eosinophils Relative 05/22/2025 2     Basophils Relative 05/22/2025 1     Absolute Neutrophils 05/22/2025 2.79     Absolute Immature Grans 05/22/2025 0.02     Absolute Lymphocytes 05/22/2025 0.74     Absolute Monocytes 05/22/2025 0.32     Eosinophils Absolute 05/22/2025 0.07     Basophils Absolute 05/22/2025 0.04    Hospital Outpatient Visit on 04/28/2025   Component Date Value    WBC 04/28/2025 4.28 (L)     RBC 04/28/2025 3.82     Hemoglobin 04/28/2025 12.3     Hematocrit 04/28/2025 37.5     MCV 04/28/2025 98     MCH 04/28/2025 32.2     MCHC 04/28/2025 32.8     RDW 04/28/2025 11.9     Platelets 04/28/2025 34 (L)     nRBC 04/28/2025 0     Segmented % 04/28/2025 74     Immature Grans % 04/28/2025 0      Lymphocytes % 04/28/2025 15     Monocytes % 04/28/2025 8     Eosinophils Relative 04/28/2025 2     Basophils Relative 04/28/2025 1     Absolute Neutrophils 04/28/2025 3.21     Absolute Immature Grans 04/28/2025 0.01     Absolute Lymphocytes 04/28/2025 0.62     Absolute Monocytes 04/28/2025 0.34     Eosinophils Absolute 04/28/2025 0.07     Basophils Absolute 04/28/2025 0.03     Sodium 04/28/2025 139     Potassium 04/28/2025 3.9     Chloride 04/28/2025 105     CO2 04/28/2025 26     ANION GAP 04/28/2025 8     BUN 04/28/2025 11     Creatinine 04/28/2025 0.88     Glucose 04/28/2025 91     Calcium 04/28/2025 9.3     AST 04/28/2025 14     ALT 04/28/2025 8     Alkaline Phosphatase 04/28/2025 69     Total Protein 04/28/2025 6.8     Albumin 04/28/2025 4.1     Total Bilirubin 04/28/2025 0.53     eGFR 04/28/2025 63     Magnesium 04/28/2025 2.0     TSH 3RD GENERATION 04/28/2025 2.023     Amylase 04/28/2025 55     Lipase 04/28/2025 49      04/28/2025 3.8    Hospital Outpatient Visit on 04/01/2025   Component Date Value    Triscuspid Valve Regurgi* 04/01/2025 37.0     RAA A4C 04/01/2025 11.6     LA Volume Index (BP) 04/01/2025 27.5     MV Peak A Daniel 04/01/2025 0.77     MV stenosis pressure 1/2* 04/01/2025 81     MV Peak E Daniel 04/01/2025 50     AV peak gradient 04/01/2025 37     LVOT stroke volume 04/01/2025 59.28     Ao VTI 04/01/2025 70.94     Aortic valve peak veloci* 04/01/2025 3.04     LVOT peak VTI 04/01/2025 18.88     LVOT peak daniel 04/01/2025 0.86     LVOT diameter 04/01/2025 2.0     E wave deceleration time 04/01/2025 278     E/A ratio 04/01/2025 0.65     MV valve area p 1/2 meth* 04/01/2025 2.72     AV LVOT peak gradient 04/01/2025 3     AV mean gradient 04/01/2025 23     TR Peak Daniel 04/01/2025 3.0     AV area peak daniel 04/01/2025 0.9     AV area by cont VTI 04/01/2025 0.8     LVOT mn grad 04/01/2025 2.0     RVID d 04/01/2025 3.4     A4C EF 04/01/2025 55     Aortic valve mean veloci* 04/01/2025 22.90     Tricuspid  valve peak reg* 04/01/2025 3.03     Left ventricular stroke * 04/01/2025 30.00     IVSd 04/01/2025 1.20     Tricuspid annular plane * 04/01/2025 2.20     Ao root 04/01/2025 2.70     Ao STJ 04/01/2025 3.80     LVPWd 04/01/2025 1.30     LA size 04/01/2025 3.5     Asc Ao 04/01/2025 4     STJ 04/01/2025 3.8     LA volume (BP) 04/01/2025 46     FS 04/01/2025 29     LVIDS 04/01/2025 2.50     IVS 04/01/2025 1.2     LVIDd 04/01/2025 3.50     LA length (A2C) 04/01/2025 5.20     LEFT VENTRICLE SYSTOLIC * 04/01/2025 23     LV DIASTOLIC VOLUME (MOD* 04/01/2025 52     LVOT Cardiac Index 04/01/2025 2.47     LVOT stroke volume index 04/01/2025 35.90     LVOT Cardiac Output 04/01/2025 4.12     Left Atrium Area-systoli* 04/01/2025 12.5     Left Atrium Area-systoli* 04/01/2025 19.7     MV E' Tissue Velocity Se* 04/01/2025 5     LVSV, 2D 04/01/2025 30     LVOT area 04/01/2025 3.14     DVI 04/01/2025 0.27     AV valve area 04/01/2025 0.84     BSA 04/01/2025 1.67     LV EF 04/01/2025 55     Est. RA pres 04/01/2025 3.0     Right Ventricular Peak S* 04/01/2025 40.00    Hospital Outpatient Visit on 03/17/2025   Component Date Value    WBC 03/17/2025 2.89 (L)     RBC 03/17/2025 3.50 (L)     Hemoglobin 03/17/2025 11.6     Hematocrit 03/17/2025 35.1     MCV 03/17/2025 100 (H)     MCH 03/17/2025 33.1     MCHC 03/17/2025 33.0     RDW 03/17/2025 13.5     MPV 03/17/2025 9.8     Platelets 03/17/2025 31 (L)     nRBC 03/17/2025 0     Segmented % 03/17/2025 70     Immature Grans % 03/17/2025 1     Lymphocytes % 03/17/2025 17     Monocytes % 03/17/2025 9     Eosinophils Relative 03/17/2025 2     Basophils Relative 03/17/2025 1     Absolute Neutrophils 03/17/2025 2.00     Absolute Immature Grans 03/17/2025 0.03     Absolute Lymphocytes 03/17/2025 0.48 (L)     Absolute Monocytes 03/17/2025 0.27     Eosinophils Absolute 03/17/2025 0.07     Basophils Absolute 03/17/2025 0.04     Sodium 03/17/2025 140     Potassium 03/17/2025 3.7     Chloride 03/17/2025  106     CO2 03/17/2025 26     ANION GAP 03/17/2025 8     BUN 03/17/2025 12     Creatinine 03/17/2025 0.91     Glucose 03/17/2025 94     Calcium 03/17/2025 8.8     AST 03/17/2025 14     ALT 03/17/2025 7     Alkaline Phosphatase 03/17/2025 85     Total Protein 03/17/2025 6.5     Albumin 03/17/2025 4.0     Total Bilirubin 03/17/2025 0.57     eGFR 03/17/2025 61     Magnesium 03/17/2025 2.0     TSH 3RD GENERATION 03/17/2025 1.222     Amylase 03/17/2025 56     Lipase 03/17/2025 54      03/17/2025 3.7     RBC Morphology 03/17/2025 Normal     Platelet Estimate 03/17/2025 Decreased (A)    Hospital Outpatient Visit on 02/03/2025   Component Date Value    WBC 02/03/2025 7.84     RBC 02/03/2025 3.30 (L)     Hemoglobin 02/03/2025 10.6 (L)     Hematocrit 02/03/2025 32.8 (L)     MCV 02/03/2025 99 (H)     MCH 02/03/2025 32.1     MCHC 02/03/2025 32.3     RDW 02/03/2025 16.7 (H)     MPV 02/03/2025 8.6 (L)     Platelets 02/03/2025 237     nRBC 02/03/2025 0     Segmented % 02/03/2025 77 (H)     Immature Grans % 02/03/2025 1     Lymphocytes % 02/03/2025 12 (L)     Monocytes % 02/03/2025 9     Eosinophils Relative 02/03/2025 0     Basophils Relative 02/03/2025 1     Absolute Neutrophils 02/03/2025 6.05     Absolute Immature Grans 02/03/2025 0.09     Absolute Lymphocytes 02/03/2025 0.96     Absolute Monocytes 02/03/2025 0.67     Eosinophils Absolute 02/03/2025 0.03     Basophils Absolute 02/03/2025 0.04     Sodium 02/03/2025 138     Potassium 02/03/2025 3.9     Chloride 02/03/2025 104     CO2 02/03/2025 25     ANION GAP 02/03/2025 9     BUN 02/03/2025 15     Creatinine 02/03/2025 0.90     Glucose 02/03/2025 96     Calcium 02/03/2025 9.0     AST 02/03/2025 13     ALT 02/03/2025 9     Alkaline Phosphatase 02/03/2025 147 (H)     Total Protein 02/03/2025 6.9     Albumin 02/03/2025 4.1     Total Bilirubin 02/03/2025 0.40     eGFR 02/03/2025 61     Magnesium 02/03/2025 2.0     TSH 3RD GENERATION 02/03/2025 0.721     Amylase 02/03/2025 58      Lipase 02/03/2025 54      02/03/2025 6.8    Hospital Outpatient Visit on 01/27/2025   Component Date Value    WBC 01/27/2025 6.58     RBC 01/27/2025 3.14 (L)     Hemoglobin 01/27/2025 9.9 (L)     Hematocrit 01/27/2025 30.6 (L)     MCV 01/27/2025 98     MCH 01/27/2025 31.5     MCHC 01/27/2025 32.4     RDW 01/27/2025 15.3 (H)     MPV 01/27/2025 9.2     Platelets 01/27/2025 140 (L)     nRBC 01/27/2025 0     Segmented % 01/27/2025 81 (H)     Immature Grans % 01/27/2025 2     Lymphocytes % 01/27/2025 9 (L)     Monocytes % 01/27/2025 7     Eosinophils Relative 01/27/2025 0     Basophils Relative 01/27/2025 1     Absolute Neutrophils 01/27/2025 5.34     Absolute Immature Grans 01/27/2025 0.15     Absolute Lymphocytes 01/27/2025 0.59 (L)     Absolute Monocytes 01/27/2025 0.46     Eosinophils Absolute 01/27/2025 0.01     Basophils Absolute 01/27/2025 0.03     Sodium 01/27/2025 140     Potassium 01/27/2025 3.8     Chloride 01/27/2025 105     CO2 01/27/2025 25     ANION GAP 01/27/2025 10     BUN 01/27/2025 13     Creatinine 01/27/2025 0.82     Glucose 01/27/2025 134     Calcium 01/27/2025 9.2     AST 01/27/2025 14     ALT 01/27/2025 10     Alkaline Phosphatase 01/27/2025 151 (H)     Total Protein 01/27/2025 6.6     Albumin 01/27/2025 3.8     Total Bilirubin 01/27/2025 0.33     eGFR 01/27/2025 69    There may be more visits with results that are not included.     Imaging: No results found.    Review of Systems:  Review of Systems   Constitutional:  Negative for activity change, diaphoresis, fatigue and fever.   HENT:  Positive for hearing loss. Negative for nosebleeds.    Eyes:  Negative for visual disturbance.   Respiratory:  Negative for apnea, cough, choking, chest tightness, shortness of breath, wheezing and stridor.    Cardiovascular:  Negative for chest pain, palpitations and leg swelling.   Gastrointestinal:  Negative for abdominal pain, anal bleeding, blood in stool, constipation, diarrhea and nausea.    Endocrine: Negative for cold intolerance and heat intolerance.   Genitourinary:  Negative for hematuria.   Musculoskeletal:  Positive for arthralgias. Negative for gait problem and myalgias.   Skin:  Negative for pallor and rash.   Allergic/Immunologic: Positive for immunocompromised state.   Neurological:  Negative for dizziness, speech difficulty, weakness and light-headedness.   Hematological:  Bruises/bleeds easily.   Psychiatric/Behavioral:  Negative for sleep disturbance. The patient is not nervous/anxious.        Physical Exam:  Physical Exam  Vitals reviewed.   Constitutional:       General: She is not in acute distress.     Appearance: Normal appearance. She is normal weight. She is not ill-appearing, toxic-appearing or diaphoretic.   HENT:      Head: Normocephalic.     Eyes:      General: No scleral icterus.     Conjunctiva/sclera: Conjunctivae normal.     Neck:      Vascular: No carotid bruit.     Cardiovascular:      Rate and Rhythm: Normal rate and regular rhythm.      Pulses: Normal pulses.      Heart sounds: Murmur (3/4 systolic ejection murmur, late peaking, A2 preserved, no diastolic murmurs loudest at the base audible throughout) heard.      No friction rub. No gallop.      Comments: Occasional extrasystoles  Pulmonary:      Effort: Pulmonary effort is normal. No respiratory distress.      Breath sounds: Normal breath sounds. No stridor. No wheezing, rhonchi or rales.   Abdominal:      General: Abdomen is flat. There is no distension.      Palpations: Abdomen is soft.      Tenderness: There is no abdominal tenderness.     Musculoskeletal:      Right lower leg: No edema.      Left lower leg: No edema.     Skin:     General: Skin is warm and dry.      Capillary Refill: Capillary refill takes less than 2 seconds.      Coloration: Skin is not jaundiced or pale.      Findings: No bruising or erythema.     Neurological:      Mental Status: She is alert and oriented to person, place, and time.      Psychiatric:         Mood and Affect: Mood normal.         Behavior: Behavior normal.         Thought Content: Thought content normal.         Judgment: Judgment normal.         Discussion/Summary: Valvular heart disease, aortic stenosis, mostly in the moderate range by personal interpretation, currently asymptomatic, will continue to follow expectantly, I asked her to pay attention to symptoms including chest pain dyspnea lightheadedness or syncope.  And notify me immediately.  At the present time favor no changes medications, will follow the patient.    This note was completed in part utilizing [employer] direct voice recognition software.   Grammatical errors, random word insertion, spelling mistakes, and incomplete sentences may be an occasional consequence of the system secondary to software limitations, ambient noise and hardware issues. At the time of dictation, efforts were made to edit, clarify and /or correct errors.  Please read the chart carefully and recognize, using context, where substitutions have occurred.  If you have any questions or concerns about the context, text or information contained within the body of this dictation, please contact myself, the provider, for further clarification.        [1]   Patient Active Problem List  Diagnosis    Essential (primary) hypertension    Mixed hyperlipidemia    Hyperthyroidism    Endometrial cancer (HCC)    Mild protein-calorie malnutrition (HCC)    Acute blood loss anemia    Encounter for central line care    Drug-induced neutropenia (HCC)    Acute deep vein thrombosis (DVT) of femoral vein of left lower extremity (HCC)    Severe aortic stenosis    Thrombocytopenia (HCC)   [2]   Past Medical History:  Diagnosis Date    Lower gastrointestinal hemorrhage 04/29/2022    Tachycardia 07/18/2024   [3] No family history on file.  [4]   Past Surgical History:  Procedure Laterality Date    COLONOSCOPY      HYSTERECTOMY N/A 10/29/2024    Procedure: ROBOTIC  TOTAL LAPAROSCOPIC HYSTERECTOMY, BILATERAL SALPINGO-OOPHERECTOMY;  Surgeon: Lavon Jacome MD;  Location: [facility];  Service: Gynecology Oncology    IR PORT PLACEMENT  8/26/2024    LEG SURGERY Right     inserted ebenezer 12/20/2021    TX LMTD LMPHADEC STAGING SPX PEL&PARA-AORTIC Bilateral 10/29/2024    Procedure: BILATERAL PARAAORTIC LYMPH NODE DISSECTION, DEBULKING;  Surgeon: Lavon Jacome MD;  Location: [facility];  Service: Gynecology Oncology   [5]   Current Outpatient Medications:     acetaminophen (TYLENOL) 160 MG chewable tablet, Chew 160 mg every 6 (six) hours as needed for mild pain, Disp: , Rfl:     amLODIPine (NORVASC) 10 mg tablet, Take 1 tablet (10 mg total) by mouth daily, Disp: 90 tablet, Rfl: 3    apixaban (Eliquis) 5 mg, Take 1 tablet (5 mg total) by mouth 2 (two) times a day, Disp: 60 tablet, Rfl: 3    calcium carbonate (Calcium 600) 600 MG tablet, Take 600 mg by mouth in the morning and 600 mg in the evening. Take with meals., Disp: , Rfl:     methimazole (TAPAZOLE) 5 mg tablet, TAKE 1 TABLET (5 MG TOTAL) BY MOUTH IN THE MORNING, Disp: 90 tablet, Rfl: 1    Multiple Vitamin (MULTIVITAMIN ADULT PO), Take 1 tablet by mouth in the morning., Disp: , Rfl:     simvastatin (ZOCOR) 10 mg tablet, Take 1 tablet (10 mg total) by mouth daily at bedtime, Disp: 90 tablet, Rfl: 3    ondansetron (ZOFRAN) 8 mg tablet, Take 1 tablet (8 mg total) by mouth every 8 (eight) hours as needed for nausea or vomiting (Patient not taking: Reported on 7/2/2025), Disp: 30 tablet, Rfl: 1    predniSONE 10 mg tablet, Take 5 tablets PO QD x 5 days, then take 4 tablets PO x 5 days, then 3 tablets PO x 3 days, then 2 tablets PO x 3 days then 1 tablet PO x 2 days. (Patient not taking: Reported on 7/2/2025), Disp: 62 tablet, Rfl: 0

## 2025-07-21 ENCOUNTER — HOSPITAL ENCOUNTER (OUTPATIENT)
Dept: INFUSION CENTER | Facility: HOSPITAL | Age: 75
Discharge: HOME | End: 2025-07-21
Payer: MEDICARE

## 2025-07-21 DIAGNOSIS — D69.6 THROMBOCYTOPENIA (HCC): ICD-10-CM

## 2025-07-21 DIAGNOSIS — Z45.2 ENCOUNTER FOR CENTRAL LINE CARE: Primary | ICD-10-CM

## 2025-07-21 DIAGNOSIS — C54.1 ENDOMETRIAL CANCER (HCC): ICD-10-CM

## 2025-07-21 DIAGNOSIS — R68.89 OTHER GENERAL SYMPTOMS AND SIGNS: ICD-10-CM

## 2025-07-21 LAB
ALBUMIN SERPL BCG-MCNC: 4 G/DL (ref 3.5–5)
ALP SERPL-CCNC: 68 U/L (ref 34–104)
ALT SERPL W P-5'-P-CCNC: 10 U/L (ref 7–52)
AMYLASE SERPL-CCNC: 50 IU/L (ref 29–103)
ANION GAP SERPL CALCULATED.3IONS-SCNC: 9 MMOL/L (ref 4–13)
AST SERPL W P-5'-P-CCNC: 16 U/L (ref 13–39)
BASOPHILS # BLD AUTO: 0.07 THOUSANDS/ÂΜL (ref 0–0.1)
BASOPHILS NFR BLD AUTO: 2 % (ref 0–1)
BILIRUB SERPL-MCNC: 0.57 MG/DL (ref 0.2–1)
BUN SERPL-MCNC: 10 MG/DL (ref 5–25)
CALCIUM SERPL-MCNC: 9.3 MG/DL (ref 8.4–10.2)
CANCER AG125 SERPL-ACNC: 5.7 U/ML (ref 0–35)
CHLORIDE SERPL-SCNC: 106 MMOL/L (ref 96–108)
CO2 SERPL-SCNC: 26 MMOL/L (ref 21–32)
CREAT SERPL-MCNC: 0.97 MG/DL (ref 0.6–1.3)
EOSINOPHIL # BLD AUTO: 0.06 THOUSAND/ÂΜL (ref 0–0.61)
EOSINOPHIL NFR BLD AUTO: 1 % (ref 0–6)
ERYTHROCYTE [DISTWIDTH] IN BLOOD BY AUTOMATED COUNT: 14.8 % (ref 11.6–15.1)
GFR SERPL CREATININE-BSD FRML MDRD: 56 ML/MIN/1.73SQ M
GLUCOSE SERPL-MCNC: 97 MG/DL (ref 65–140)
HCT VFR BLD AUTO: 38.2 % (ref 34.8–46.1)
HGB BLD-MCNC: 12.8 G/DL (ref 11.5–15.4)
IMM GRANULOCYTES # BLD AUTO: 0 THOUSAND/UL (ref 0–0.2)
IMM GRANULOCYTES NFR BLD AUTO: 0 % (ref 0–2)
LIPASE SERPL-CCNC: 54 U/L (ref 11–82)
LYMPHOCYTES # BLD AUTO: 0.78 THOUSANDS/ÂΜL (ref 0.6–4.47)
LYMPHOCYTES NFR BLD AUTO: 19 % (ref 14–44)
MAGNESIUM SERPL-MCNC: 2.1 MG/DL (ref 1.9–2.7)
MCH RBC QN AUTO: 32.3 PG (ref 26.8–34.3)
MCHC RBC AUTO-ENTMCNC: 33.5 G/DL (ref 31.4–37.4)
MCV RBC AUTO: 97 FL (ref 82–98)
MONOCYTES # BLD AUTO: 0.33 THOUSAND/ÂΜL (ref 0.17–1.22)
MONOCYTES NFR BLD AUTO: 8 % (ref 4–12)
NEUTROPHILS # BLD AUTO: 2.9 THOUSANDS/ÂΜL (ref 1.85–7.62)
NEUTS SEG NFR BLD AUTO: 70 % (ref 43–75)
NRBC BLD AUTO-RTO: 0 /100 WBCS
PLATELET # BLD AUTO: 83 THOUSANDS/UL (ref 149–390)
PMV BLD AUTO: 9.3 FL (ref 8.9–12.7)
POTASSIUM SERPL-SCNC: 3.9 MMOL/L (ref 3.5–5.3)
PROT SERPL-MCNC: 6.6 G/DL (ref 6.4–8.4)
RBC # BLD AUTO: 3.96 MILLION/UL (ref 3.81–5.12)
SODIUM SERPL-SCNC: 141 MMOL/L (ref 135–147)
TSH SERPL DL<=0.05 MIU/L-ACNC: 1.95 UIU/ML (ref 0.45–4.5)
WBC # BLD AUTO: 4.14 THOUSAND/UL (ref 4.31–10.16)

## 2025-07-21 PROCEDURE — 82150 ASSAY OF AMYLASE: CPT

## 2025-07-21 PROCEDURE — 84443 ASSAY THYROID STIM HORMONE: CPT

## 2025-07-21 PROCEDURE — 80053 COMPREHEN METABOLIC PANEL: CPT

## 2025-07-21 PROCEDURE — 83690 ASSAY OF LIPASE: CPT

## 2025-07-21 PROCEDURE — 85025 COMPLETE CBC W/AUTO DIFF WBC: CPT

## 2025-07-21 PROCEDURE — 83735 ASSAY OF MAGNESIUM: CPT

## 2025-07-21 PROCEDURE — 86304 IMMUNOASSAY TUMOR CA 125: CPT

## 2025-07-21 NOTE — PROGRESS NOTES
Nallely Peacock  tolerated treatment well with no complications.      Nallely Peacock is aware of future appt on 09/08/25 at 1100.     AVS printed and given to Nallely Peacock:  yes

## 2025-07-23 DIAGNOSIS — E78.2 MIXED HYPERLIPIDEMIA: ICD-10-CM

## 2025-07-23 DIAGNOSIS — I10 ESSENTIAL (PRIMARY) HYPERTENSION: ICD-10-CM

## 2025-07-23 RX ORDER — SIMVASTATIN 10 MG
10 TABLET ORAL
Qty: 90 TABLET | Refills: 2 | Status: SHIPPED | OUTPATIENT
Start: 2025-07-23

## 2025-07-23 RX ORDER — AMLODIPINE BESYLATE 10 MG/1
10 TABLET ORAL DAILY
Qty: 90 TABLET | Refills: 2 | Status: SHIPPED | OUTPATIENT
Start: 2025-07-23

## 2025-07-23 NOTE — TELEPHONE ENCOUNTER
Medication: amLODIPine (NORVASC) 10 mg tablet     Dose/Frequency:   Take 1 tablet (10 mg total) by mouth daily        Quantity: 90    Pharmacy: [facility] #[id number] - [place], [address]     Office:   [x] PCP/Provider -   [] Speciality/Provider -     Does the patient have enough for 3 days?   [] Yes   [x] No - Send as HP to POD    Medication:  simvastatin (ZOCOR) 10 mg tablet    Dose/Frequency: Take 1 tablet (10 mg total) by mouth daily at bedtime     Quantity: 90    Pharmacy: [id number] - [place], [address]     Office:   [x] PCP/Provider -   [] Speciality/Provider -     Does the patient have enough for 3 days?   [] Yes   [x] No - Send as HP to POD

## 2025-07-24 ENCOUNTER — TELEPHONE (OUTPATIENT)
Age: 75
End: 2025-07-24

## 2025-07-24 NOTE — TELEPHONE ENCOUNTER
Provider: Rosario Mares    Patient called in to inquire if there is anything immediately concerning with any of the blood work she completed on 7/21. I confirmed with her if there are any concerns with her blood work, we would call her and make her aware.    Patient reports she does not need a returned phone call if there are not any concerns. She has no additional questions or concerns at this time.

## 2025-08-07 ENCOUNTER — OFFICE VISIT (OUTPATIENT)
Age: 75
End: 2025-08-07
Payer: MEDICARE

## 2025-08-07 ENCOUNTER — TELEPHONE (OUTPATIENT)
Age: 75
End: 2025-08-07

## 2025-08-07 VITALS
WEIGHT: 151 LBS | HEART RATE: 84 BPM | DIASTOLIC BLOOD PRESSURE: 81 MMHG | HEIGHT: 64 IN | TEMPERATURE: 98.1 F | SYSTOLIC BLOOD PRESSURE: 140 MMHG | OXYGEN SATURATION: 99 % | BODY MASS INDEX: 25.78 KG/M2 | RESPIRATION RATE: 18 BRPM

## 2025-08-07 VITALS
RESPIRATION RATE: 18 BRPM | SYSTOLIC BLOOD PRESSURE: 140 MMHG | DIASTOLIC BLOOD PRESSURE: 81 MMHG | OXYGEN SATURATION: 99 % | WEIGHT: 151 LBS | HEART RATE: 84 BPM | TEMPERATURE: 98.1 F | HEIGHT: 64 IN | BODY MASS INDEX: 25.78 KG/M2

## 2025-08-07 DIAGNOSIS — D69.6 THROMBOCYTOPENIA (HCC): Primary | ICD-10-CM

## 2025-08-07 DIAGNOSIS — C54.1 ENDOMETRIAL CANCER (HCC): Primary | ICD-10-CM

## 2025-08-07 DIAGNOSIS — I82.412 ACUTE DEEP VEIN THROMBOSIS (DVT) OF FEMORAL VEIN OF LEFT LOWER EXTREMITY (HCC): ICD-10-CM

## 2025-08-07 DIAGNOSIS — D69.6 THROMBOCYTOPENIA (HCC): ICD-10-CM

## 2025-08-07 DIAGNOSIS — C54.1 ENDOMETRIAL CANCER (HCC): ICD-10-CM

## 2025-08-07 PROCEDURE — 99214 OFFICE O/P EST MOD 30 MIN: CPT | Performed by: OBSTETRICS & GYNECOLOGY

## 2025-08-07 PROCEDURE — G2211 COMPLEX E/M VISIT ADD ON: HCPCS | Performed by: OBSTETRICS & GYNECOLOGY

## 2025-08-07 PROCEDURE — 99204 OFFICE O/P NEW MOD 45 MIN: CPT | Performed by: NURSE PRACTITIONER

## 2025-08-07 RX ORDER — PREDNISONE 10 MG/1
TABLET ORAL
Qty: 154 TABLET | Refills: 0 | Status: SHIPPED | OUTPATIENT
Start: 2025-08-07 | End: 2025-08-07

## 2025-08-07 RX ORDER — PREDNISONE 10 MG/1
TABLET ORAL
Qty: 150 TABLET | Refills: 0 | Status: SHIPPED | OUTPATIENT
Start: 2025-08-07 | End: 2025-10-02

## 2025-08-07 RX ORDER — PANTOPRAZOLE SODIUM 20 MG/1
20 TABLET, DELAYED RELEASE ORAL DAILY
Qty: 60 TABLET | Refills: 0 | Status: SHIPPED | OUTPATIENT
Start: 2025-08-07

## 2025-08-15 ENCOUNTER — TELEPHONE (OUTPATIENT)
Age: 75
End: 2025-08-15

## 2025-08-18 ENCOUNTER — HOSPITAL ENCOUNTER (OUTPATIENT)
Dept: CT IMAGING | Facility: HOSPITAL | Age: 75
Discharge: HOME | End: 2025-08-18
Attending: OBSTETRICS & GYNECOLOGY
Payer: MEDICARE

## 2025-08-18 DIAGNOSIS — C54.1 ENDOMETRIAL CANCER (HCC): ICD-10-CM

## 2025-08-18 PROCEDURE — 74177 CT ABD & PELVIS W/CONTRAST: CPT

## 2025-08-18 PROCEDURE — 71260 CT THORAX DX C+: CPT

## 2025-08-18 RX ADMIN — IOHEXOL 50 ML: 350 INJECTION, SOLUTION INTRAVENOUS at 12:32

## (undated) DEVICE — PAD GROUNDING DUAL ADULT

## (undated) DEVICE — VISUALIZATION SYSTEM: Brand: CLEARIFY

## (undated) DEVICE — MAYO STAND COVER: Brand: CONVERTORS

## (undated) DEVICE — TISSUE RETRIEVAL SYSTEM: Brand: INZII RETRIEVAL SYSTEM

## (undated) DEVICE — GLOVE PI ULTRA TOUCH SZ.7.5

## (undated) DEVICE — UTERINE MANIPULATOR RUMI 5.1 X 6 CM

## (undated) DEVICE — CHLORHEXIDINE 4PCT 4 OZ

## (undated) DEVICE — MONOPOLAR CURVED SCISSORS: Brand: ENDOWRIST

## (undated) DEVICE — CLAMP TOWEL TUBING DISPOSABLE

## (undated) DEVICE — Device: Brand: OMNICLOSE TROCAR SITE CLOSURE DEVICE

## (undated) DEVICE — SPECIMEN CONTAINER STERILE PEEL PACK

## (undated) DEVICE — TRAY FOLEY 16FR URIMETER SILICONE SURESTEP

## (undated) DEVICE — KIT, BETHLEHEM ROBOTIC PROST: Brand: CARDINAL HEALTH

## (undated) DEVICE — ANTIBACTERIAL VIOLET BRAIDED (POLYGLACTIN 910), SYNTHETIC ABSORBABLE SUTURE: Brand: COATED VICRYL

## (undated) DEVICE — PROGRASP FORCEPS: Brand: ENDOWRIST

## (undated) DEVICE — LARGE NEEDLE DRIVER: Brand: ENDOWRIST

## (undated) DEVICE — MEDIUM-LARGE CLIP APPLIER: Brand: ENDOWRIST

## (undated) DEVICE — COLUMN DRAPE

## (undated) DEVICE — AIRSEAL TUBE SMOKE EVAC LUMENX3 FILTERED

## (undated) DEVICE — SYRINGE 50ML LL

## (undated) DEVICE — FENESTRATED BIPOLAR FORCEPS: Brand: ENDOWRIST

## (undated) DEVICE — OCCLUDER COLPO-PNEUMO

## (undated) DEVICE — INTENDED FOR TISSUE SEPARATION, AND OTHER PROCEDURES THAT REQUIRE A SHARP SURGICAL BLADE TO PUNCTURE OR CUT.: Brand: BARD-PARKER SAFETY BLADES SIZE 15, STERILE

## (undated) DEVICE — TIP COVER ACCESSORY

## (undated) DEVICE — HEMOSTATIC MATRIX SURGIFLO 8ML W/THROMBIN

## (undated) DEVICE — GLOVE INDICATOR PI UNDERGLOVE SZ 8 BLUE

## (undated) DEVICE — PREMIUM DRY TRAY LF: Brand: MEDLINE INDUSTRIES, INC.

## (undated) DEVICE — SUT VICRYL PLUS 0 UR-6 27IN VCP603H

## (undated) DEVICE — HEM-O-LOK CLIP CARTRIDGE MED/LARGE DA VINCI SI/XI

## (undated) DEVICE — ARM DRAPE

## (undated) DEVICE — 40595 XL TRENDELENBURG POSITIONING KIT: Brand: 40595 XL TRENDELENBURG POSITIONING KIT

## (undated) DEVICE — ELECTRO LUBE IS A SINGLE PATIENT USE DEVICE THAT IS INTENDED TO BE USED ON ELECTROSURGICAL ELECTRODES TO REDUCE STICKING.: Brand: KEY SURGICAL ELECTRO LUBE

## (undated) DEVICE — SUT MONOCRYL 4-0 PS-2 27 IN Y426H

## (undated) DEVICE — Device: Brand: TISSUE RETRIEVAL SYSTEM

## (undated) DEVICE — EXOFIN PRECISION PEN HIGH VISCOSITY TOPICAL SKIN ADHESIVE: Brand: EXOFIN PRECISION PEN, 1G

## (undated) DEVICE — GLOVE INDICATOR PI UNDERGLOVE SZ 7 BLUE

## (undated) DEVICE — SURGIFLO ENDOSCOPIC APPICATOR: Brand: ETHICON

## (undated) DEVICE — 1820 FOAM BLOCK NEEDLE COUNTER: Brand: DEVON

## (undated) DEVICE — INTENDED FOR TISSUE SEPARATION, AND OTHER PROCEDURES THAT REQUIRE A SHARP SURGICAL BLADE TO PUNCTURE OR CUT.: Brand: BARD-PARKER SAFETY BLADES SIZE 11, STERILE

## (undated) DEVICE — CANNULA SEAL

## (undated) DEVICE — BLADELESS OBTURATOR: Brand: WECK VISTA

## (undated) DEVICE — SUT STRATAFIX SPIRAL 2-0 CT-1 30 CM SXPP1B410

## (undated) DEVICE — TROCAR PORT ACCESS 12 X120MM W/BLDLS OPTICAL TIP AIRSEAL

## (undated) DEVICE — MARYLAND BIPOLAR FORCEPS: Brand: ENDOWRIST